# Patient Record
Sex: FEMALE | Race: BLACK OR AFRICAN AMERICAN | Employment: OTHER
[De-identification: names, ages, dates, MRNs, and addresses within clinical notes are randomized per-mention and may not be internally consistent; named-entity substitution may affect disease eponyms.]

---

## 2017-01-13 ENCOUNTER — SURGERY (OUTPATIENT)
Age: 52
End: 2017-01-13

## 2017-01-13 ENCOUNTER — ANESTHESIA (OUTPATIENT)
Dept: ENDOSCOPY | Facility: HOSPITAL | Age: 52
End: 2017-01-13
Payer: COMMERCIAL

## 2017-01-13 ENCOUNTER — ANESTHESIA EVENT (OUTPATIENT)
Dept: ENDOSCOPY | Facility: HOSPITAL | Age: 52
End: 2017-01-13
Payer: COMMERCIAL

## 2017-01-13 RX ORDER — LIDOCAINE HYDROCHLORIDE 10 MG/ML
INJECTION, SOLUTION EPIDURAL; INFILTRATION; INTRACAUDAL; PERINEURAL AS NEEDED
Status: DISCONTINUED | OUTPATIENT
Start: 2017-01-13 | End: 2017-01-13 | Stop reason: SURG

## 2017-01-13 RX ORDER — SODIUM CHLORIDE, SODIUM LACTATE, POTASSIUM CHLORIDE, CALCIUM CHLORIDE 600; 310; 30; 20 MG/100ML; MG/100ML; MG/100ML; MG/100ML
INJECTION, SOLUTION INTRAVENOUS CONTINUOUS PRN
Status: DISCONTINUED | OUTPATIENT
Start: 2017-01-13 | End: 2017-01-13 | Stop reason: SURG

## 2017-01-13 RX ADMIN — SODIUM CHLORIDE, SODIUM LACTATE, POTASSIUM CHLORIDE, CALCIUM CHLORIDE: 600; 310; 30; 20 INJECTION, SOLUTION INTRAVENOUS at 12:36:00

## 2017-01-13 RX ADMIN — LIDOCAINE HYDROCHLORIDE 50 MG: 10 INJECTION, SOLUTION EPIDURAL; INFILTRATION; INTRACAUDAL; PERINEURAL at 12:42:00

## 2017-01-13 RX ADMIN — SODIUM CHLORIDE, SODIUM LACTATE, POTASSIUM CHLORIDE, CALCIUM CHLORIDE: 600; 310; 30; 20 INJECTION, SOLUTION INTRAVENOUS at 13:08:00

## 2017-01-13 NOTE — ANESTHESIA PREPROCEDURE EVALUATION
Anesthesia PreOp Note    HPI:     Madi Last is a 49year old female who presents for preoperative consultation requested by: Alix Lehman MD    Date of Surgery: 1/13/2017    Procedure(s):  COLONOSCOPY  Indication: COLON SCRENNIG    Histo the lungs 4 (four) times daily as needed. Disp: 1 each Rfl: 2 Taking   albuterol sulfate (2.5 MG/3ML) 0.083% Inhalation Nebu Soln Take 3 mL (2.5 mg total) by nebulization every 6 (six) hours as needed for Wheezing.  Disp: 1 Box Rfl: 0 Taking   Levonorgestre Anesthesia ROS/Med Hx and Physical Exam     Patient summary reviewed and Nursing notes reviewed    Airway   Mallampati: I  TM distance: >3 FB  Neck ROM: full  Dental - normal exam     Pulmonary - negative ROS and normal exam   (+) asthma, sleep apnea,   Ca

## 2017-01-13 NOTE — ANESTHESIA POSTPROCEDURE EVALUATION
Patient: Niyah Major    Procedure Summary     Date Anesthesia Start Anesthesia Stop Room / Location    01/13/17 1239  [facility] ENDOSCOPY 01 / [facility] ENDOSCOPY       Procedure Diagnosis Surgeon Responsible Provider    COLONOSCOPY (N/A ) Special screenin

## 2017-01-16 ENCOUNTER — TELEPHONE (OUTPATIENT)
Dept: GASTROENTEROLOGY | Facility: CLINIC | Age: 52
End: 2017-01-16

## 2017-01-16 NOTE — TELEPHONE ENCOUNTER
----- Message from Hans Haile MD sent at 1/15/2017  2:37 PM CST -----  RN to place on the colon call back for 5 years and mail letter to the pt. Thanks.

## 2017-04-08 ENCOUNTER — OFFICE VISIT (OUTPATIENT)
Dept: FAMILY MEDICINE CLINIC | Facility: CLINIC | Age: 52
End: 2017-04-08

## 2017-04-08 VITALS
HEART RATE: 62 BPM | SYSTOLIC BLOOD PRESSURE: 157 MMHG | HEIGHT: 65 IN | TEMPERATURE: 98 F | BODY MASS INDEX: 48.82 KG/M2 | DIASTOLIC BLOOD PRESSURE: 85 MMHG | WEIGHT: 293 LBS | RESPIRATION RATE: 17 BRPM

## 2017-04-08 DIAGNOSIS — I10 ESSENTIAL HYPERTENSION: Primary | ICD-10-CM

## 2017-04-08 PROCEDURE — 99214 OFFICE O/P EST MOD 30 MIN: CPT | Performed by: FAMILY MEDICINE

## 2017-04-08 PROCEDURE — 99212 OFFICE O/P EST SF 10 MIN: CPT | Performed by: FAMILY MEDICINE

## 2017-04-08 NOTE — PATIENT INSTRUCTIONS
Monitor blood pressures and record at home. Limit salt intake. Consider starting antihypertensive. Recommend weight loss via daily exercising and consistent healthy dietary changes.

## 2017-04-22 NOTE — PROGRESS NOTES
HPI:    Patient ID: Mabel Prakash is a 49year old female. Hypertension  This is a chronic problem. The current episode started more than 1 year ago. The problem is unchanged. Condition status: variable measres.  Associated symptoms include per normal.   Left Ear: Tympanic membrane and ear canal normal.   Nose: Mucosal edema present. Thickened soft palate hanging low and obstructive. Cardiovascular: Normal rate and regular rhythm. Exam reveals no gallop. Edema present. Carotid bruit not pr

## 2017-05-13 ENCOUNTER — OFFICE VISIT (OUTPATIENT)
Dept: OPHTHALMOLOGY | Facility: CLINIC | Age: 52
End: 2017-05-13

## 2017-05-13 DIAGNOSIS — H52.203 MYOPIA WITH ASTIGMATISM, BILATERAL: ICD-10-CM

## 2017-05-13 DIAGNOSIS — Z96.1 PSEUDOPHAKIA OF BOTH EYES: ICD-10-CM

## 2017-05-13 DIAGNOSIS — H26.493: Primary | ICD-10-CM

## 2017-05-13 DIAGNOSIS — H52.13 MYOPIA WITH ASTIGMATISM, BILATERAL: ICD-10-CM

## 2017-05-13 PROBLEM — H26.499 AFTER-CATARACT WITH VISION OBSCURED: Status: ACTIVE | Noted: 2017-05-13

## 2017-05-13 PROBLEM — H52.10 MYOPIA WITH ASTIGMATISM: Status: ACTIVE | Noted: 2017-05-13

## 2017-05-13 PROBLEM — H52.209 MYOPIA WITH ASTIGMATISM: Status: ACTIVE | Noted: 2017-05-13

## 2017-05-13 PROCEDURE — 92015 DETERMINE REFRACTIVE STATE: CPT | Performed by: OPHTHALMOLOGY

## 2017-05-13 PROCEDURE — 99243 OFF/OP CNSLTJ NEW/EST LOW 30: CPT | Performed by: OPHTHALMOLOGY

## 2017-05-13 PROCEDURE — 99212 OFFICE O/P EST SF 10 MIN: CPT | Performed by: OPHTHALMOLOGY

## 2017-05-13 NOTE — PROGRESS NOTES
Dunia Proctor is a 49year old female. HPI:     HPI     Consult    Additional comments: Referral from Dr. Silvana Quan           Comments   Pt complains of blurred vision with her glasses at distance and would like an updated Rx.         Last edite for: Constitutional, Gastrointestinal, Neurological, Skin, Genitourinary, Musculoskeletal, HENT, Endocrine, Cardiovascular, Respiratory, Psychiatric, Allergic/Imm, Heme/Lymph    Last edited by Meredith Pro O.T. on 5/13/2017  7:34 AM. (History) YAG laser done to both eyes in the near future. Discussed risks, benefits, treatments and alternatives. Information given and reviewed with patient. Pseudophakia of both eyes  Same as above.            Myopia with astigmatism  Hold of on getting new

## 2017-05-13 NOTE — PATIENT INSTRUCTIONS
After-cataract with vision obscured  Discussed posterior capsule opacity with patient. Could have YAG laser done to both eyes in the near future. Discussed risks, benefits, treatments and alternatives. Information given and reviewed with patient.

## 2017-05-13 NOTE — ASSESSMENT & PLAN NOTE
Discussed posterior capsule opacity with patient. Could have YAG laser done to both eyes in the near future. Discussed risks, benefits, treatments and alternatives. Information given and reviewed with patient.

## 2017-05-17 ENCOUNTER — OFFICE VISIT (OUTPATIENT)
Dept: OPHTHALMOLOGY | Facility: CLINIC | Age: 52
End: 2017-05-17

## 2017-05-17 DIAGNOSIS — H26.491 AFTER-CATARACT WITH VISION OBSCURED, RIGHT: Primary | ICD-10-CM

## 2017-05-17 PROCEDURE — 66821 AFTER CATARACT LASER SURGERY: CPT | Performed by: OPHTHALMOLOGY

## 2017-05-17 NOTE — PATIENT INSTRUCTIONS
After-cataract with vision obscured  Right YAG Laser Capsulotomy was done in the office today by Dr. Peace Brown with no complications. Pt will return on May 31 for YAG laser of t he left eye.

## 2017-05-17 NOTE — ASSESSMENT & PLAN NOTE
Right YAG Laser Capsulotomy was done in the office today by Dr. Hieu Mauricio with no complications. Pt will return on May 31 for YAG laser of t he left eye.

## 2017-05-17 NOTE — PROGRESS NOTES
Robbie Gan is a 47year old female. HPI:     HPI     Here for a YAG laser capsulotomy of the right eye. Pt complains of blurred vision OU and is set up for left YAG on 5/31/17.         Last edited by Jenny Urbina O.T. on 5/17/2017  6:4 Visual Acuity (Snellen - Linear)      Right Left   Dist cc 20/50 20/30   Dist ph cc 20/40        Correction:  Glasses      Pupils      Pupils   Right PERRL   Left PERRL         Dilation     Right eye:  1.0% Mydriacyl and 2.5% Teo Synephrine @ 6:44 AM    X

## 2017-05-31 ENCOUNTER — TELEPHONE (OUTPATIENT)
Dept: OPHTHALMOLOGY | Facility: CLINIC | Age: 52
End: 2017-05-31

## 2017-05-31 ENCOUNTER — OFFICE VISIT (OUTPATIENT)
Dept: OPHTHALMOLOGY | Facility: CLINIC | Age: 52
End: 2017-05-31

## 2017-05-31 DIAGNOSIS — H26.8 OTHER CATARACT: Primary | ICD-10-CM

## 2017-05-31 DIAGNOSIS — H26.492 AFTER-CATARACT WITH VISION OBSCURED, LEFT: Primary | ICD-10-CM

## 2017-05-31 PROCEDURE — 66821 AFTER CATARACT LASER SURGERY: CPT | Performed by: OPHTHALMOLOGY

## 2017-05-31 NOTE — PATIENT INSTRUCTIONS
After-cataract with vision obscured  LEFT YAG Laser Capsulotomy was done in the office today by Dr. Fernando Garcia with no complications. Pt will return 2-4 weeks PO dilate BOTH eye.

## 2017-05-31 NOTE — ASSESSMENT & PLAN NOTE
LEFT YAG Laser Capsulotomy was done in the office today by Dr. Mehran Rios with no complications. Pt will return 2-4 weeks PO dilate BOTH eye.

## 2017-05-31 NOTE — PROGRESS NOTES
Mahesh Negrno is a 47year old female. HPI:     HPI     Here for a YAG laser capsulotomy of the left eye. Pt complains of blurred vision OS.         Last edited by Rajesh Olivas O.T. on 5/31/2017  7:02 AM.     Patient History:  Past [facility] Base Eye Exam     Visual Acuity (Snellen - Linear)      Right Left   Dist cc 20/30 20/30       Correction:  Glasses      Pupils      Pupils   Right PERRL   Left PERRL         Dilation     Left eye:  1.0% Mydriacyl and 2.5% Teo Synephrine @ 7:07 AM

## 2017-06-10 ENCOUNTER — OFFICE VISIT (OUTPATIENT)
Dept: OPHTHALMOLOGY | Facility: CLINIC | Age: 52
End: 2017-06-10

## 2017-06-10 DIAGNOSIS — Z98.890 POST-OPERATIVE STATE: Primary | ICD-10-CM

## 2017-06-10 PROCEDURE — 99024 POSTOP FOLLOW-UP VISIT: CPT | Performed by: OPHTHALMOLOGY

## 2017-06-10 PROCEDURE — 99212 OFFICE O/P EST SF 10 MIN: CPT | Performed by: OPHTHALMOLOGY

## 2017-06-10 NOTE — PROGRESS NOTES
Jamal Broussard is a 47year old female. HPI:     HPI     Patient is here for a post yag dilate, OU. Patient states that her vision has been improved since having her laser surgeries.        Last edited by Walter Colunga O.T. on 6/10/2017  9:5 Allergies    ROS:       PHYSICAL EXAM:     Base Eye Exam     Visual Acuity (Snellen - Linear)      Right Left   Dist cc 20/30 -2 20/30   Dist ph cc 20/25 NI   Near cc 20/30 20/25       Correction:  Glasses      Tonometry (Applanation, 10:09 AM)      Right instructions:  Return in about 1 year (around 6/10/2018) for Complete eye exam.    6/10/2017  Scribed by: Triston Dyson MD

## 2017-06-10 NOTE — PATIENT INSTRUCTIONS
Post-operative state  Patient is doing very well after bilateral YAG lasers. New glasses today; update as needed. Discussed that new prescription is only a slight change.

## 2017-06-10 NOTE — ASSESSMENT & PLAN NOTE
Patient is doing very well after bilateral YAG lasers. New glasses today; update as needed. Discussed that new prescription is only a slight change.

## 2017-07-24 ENCOUNTER — OFFICE VISIT (OUTPATIENT)
Dept: OPTOMETRY | Facility: CLINIC | Age: 52
End: 2017-07-24

## 2017-07-24 DIAGNOSIS — H52.213 IRREGULAR ASTIGMATISM OF BOTH EYES: Primary | ICD-10-CM

## 2017-07-24 PROCEDURE — 99024 POSTOP FOLLOW-UP VISIT: CPT | Performed by: OPTOMETRIST

## 2017-07-24 NOTE — PROGRESS NOTES
Neema Castellon is a 47year old female. HPI:     HPI     Patient is in for a contact lens check. Patient had Debi phelan May 2017 and wants to make sure her RX did not change. She just saw PRADEEP 6-2017  and had a refraction done.      Last edite Levonorgestrel (MIRENA IU) by Intrauterine route.  Disp:  Rfl:        Allergies:  No Known Allergies    ROS:     ROS     Negative for: Constitutional, Gastrointestinal, Neurological, Skin, Genitourinary, Musculoskeletal, HENT, Endocrine, Cardiovascular, E Astigmatism, irregular  New glasses and contact lens RX given today. No orders of the defined types were placed in this encounter.       Meds This Visit:    No prescriptions requested or ordered in this encounter     Follow up instructions:  No Fol

## 2017-07-24 NOTE — PROGRESS NOTES
Madi Last is a 47year old female. HPI:     HPI     Patient is in for a contact lens check. Patient had Meredith phelan May 2017 and wants to make sure her RX did not change. She just saw PRADEEP 6-2017  and had a refraction done.      Last edite Levonorgestrel (MIRENA IU) by Intrauterine route.  Disp:  Rfl:        Allergies:  No Known Allergies    ROS:     ROS     Negative for: Constitutional, Gastrointestinal, Neurological, Skin, Genitourinary, Musculoskeletal, HENT, Endocrine, Cardiovascular, E 7/25/2018                 ASSESSMENT/PLAN:     Diagnoses and Plan:     Astigmatism, irregular  New glasses and contact lens RX given today. No orders of the defined types were placed in this encounter.       Meds This Visit:    No prescriptions request

## 2017-09-22 ENCOUNTER — OFFICE VISIT (OUTPATIENT)
Dept: FAMILY MEDICINE CLINIC | Facility: CLINIC | Age: 52
End: 2017-09-22

## 2017-09-22 VITALS
HEIGHT: 65 IN | DIASTOLIC BLOOD PRESSURE: 92 MMHG | BODY MASS INDEX: 48.82 KG/M2 | HEART RATE: 70 BPM | TEMPERATURE: 98 F | WEIGHT: 293 LBS | SYSTOLIC BLOOD PRESSURE: 152 MMHG

## 2017-09-22 DIAGNOSIS — IMO0001 CLASS 3 OBESITY WITHOUT SERIOUS COMORBIDITY WITH BODY MASS INDEX (BMI) OF 60.0 TO 69.9 IN ADULT, UNSPECIFIED OBESITY TYPE: ICD-10-CM

## 2017-09-22 DIAGNOSIS — Z23 FLU VACCINE NEED: ICD-10-CM

## 2017-09-22 DIAGNOSIS — I10 ESSENTIAL HYPERTENSION: Primary | ICD-10-CM

## 2017-09-22 PROCEDURE — 90471 IMMUNIZATION ADMIN: CPT | Performed by: FAMILY MEDICINE

## 2017-09-22 PROCEDURE — 99214 OFFICE O/P EST MOD 30 MIN: CPT | Performed by: FAMILY MEDICINE

## 2017-09-22 PROCEDURE — 99212 OFFICE O/P EST SF 10 MIN: CPT | Performed by: FAMILY MEDICINE

## 2017-09-22 PROCEDURE — 90686 IIV4 VACC NO PRSV 0.5 ML IM: CPT | Performed by: FAMILY MEDICINE

## 2017-09-22 NOTE — PROGRESS NOTES
HPI:    Patient ID: Mabel Prakash is a 47year old female. Also needs flu vaccine. Blood Pressure   This is a recurrent problem. The current episode started more than 1 year ago. The problem has been unchanged.  The symptoms are aggravated Essential hypertension  By definition patient is hypertensive. Recommend weight loss via daily exercising and consistent healthy dietary changes. Monitor blood pressures and record at home. Limit salt intake.     2. Class 3 obesity without serious comorbid

## 2017-09-22 NOTE — PATIENT INSTRUCTIONS
Monitor blood pressures and record at home. Limit salt intake. Recommend weight loss via daily exercising and consistent healthy dietary changes. Encouraged physical fitness and daily physical activity daily (PLAY).   Will start Losartan/HCTZ 50/12.5mg or

## 2017-11-13 ENCOUNTER — TELEPHONE (OUTPATIENT)
Dept: OPHTHALMOLOGY | Facility: CLINIC | Age: 52
End: 2017-11-13

## 2017-11-13 ENCOUNTER — TELEPHONE (OUTPATIENT)
Dept: FAMILY MEDICINE CLINIC | Facility: CLINIC | Age: 52
End: 2017-11-13

## 2017-11-13 DIAGNOSIS — Z12.31 ENCOUNTER FOR SCREENING MAMMOGRAM FOR BREAST CANCER: Primary | ICD-10-CM

## 2017-11-13 NOTE — TELEPHONE ENCOUNTER
Since pt has had cataract Sx and YAGs OU. No procedures can be done to correct her astigmatism. I asked PPS and he explained it that only glasses or contacts will help correct her vision as best to 20/20 as was can.

## 2017-11-13 NOTE — TELEPHONE ENCOUNTER
Pt called stating pt had cataract surgery last year , procedure in may 2017. Pt has question, could pt get astigmatism correction and laser surgery to get 20/20 vision.   Call

## 2017-11-26 ENCOUNTER — HOSPITAL ENCOUNTER (OUTPATIENT)
Dept: MAMMOGRAPHY | Facility: HOSPITAL | Age: 52
Discharge: HOME | End: 2017-11-26
Attending: FAMILY MEDICINE
Payer: COMMERCIAL

## 2017-11-26 DIAGNOSIS — Z12.31 ENCOUNTER FOR SCREENING MAMMOGRAM FOR BREAST CANCER: ICD-10-CM

## 2017-11-26 PROCEDURE — 77063 BREAST TOMOSYNTHESIS BI: CPT | Performed by: FAMILY MEDICINE

## 2017-11-26 PROCEDURE — 77067 SCR MAMMO BI INCL CAD: CPT | Performed by: FAMILY MEDICINE

## 2017-12-09 ENCOUNTER — OFFICE VISIT (OUTPATIENT)
Dept: FAMILY MEDICINE CLINIC | Facility: CLINIC | Age: 52
End: 2017-12-09

## 2017-12-09 VITALS
RESPIRATION RATE: 17 BRPM | DIASTOLIC BLOOD PRESSURE: 92 MMHG | HEIGHT: 65 IN | BODY MASS INDEX: 48.82 KG/M2 | WEIGHT: 293 LBS | TEMPERATURE: 99 F | SYSTOLIC BLOOD PRESSURE: 150 MMHG | HEART RATE: 60 BPM

## 2017-12-09 DIAGNOSIS — E66.01 MORBID OBESITY WITH BMI OF 60.0-69.9, ADULT (HCC): ICD-10-CM

## 2017-12-09 DIAGNOSIS — I10 ESSENTIAL HYPERTENSION: Primary | ICD-10-CM

## 2017-12-09 PROCEDURE — 99212 OFFICE O/P EST SF 10 MIN: CPT | Performed by: FAMILY MEDICINE

## 2017-12-09 PROCEDURE — 99214 OFFICE O/P EST MOD 30 MIN: CPT | Performed by: FAMILY MEDICINE

## 2017-12-09 RX ORDER — LOSARTAN POTASSIUM AND HYDROCHLOROTHIAZIDE 12.5; 5 MG/1; MG/1
1 TABLET ORAL DAILY
Qty: 90 TABLET | Refills: 3 | Status: SHIPPED | OUTPATIENT
Start: 2017-12-09 | End: 2018-06-09

## 2017-12-09 NOTE — PROGRESS NOTES
HPI:    Patient ID: Monroe De La Cruz is a 47year old female. Hypertension   This is a chronic problem. The current episode started more than 1 month ago. The problem is unchanged. Condition status: Not to goal by criteria.  Pertinent negatives in Constitutional: She is oriented to person, place, and time and obese. HENT:   Right Ear: Tympanic membrane and ear canal normal.   Left Ear: Tympanic membrane and ear canal normal.   Nose: Mucosal edema present.    Mouth/Throat: Oropharynx is clear and

## 2017-12-09 NOTE — PATIENT INSTRUCTIONS
Monitor blood pressures and record at home. Limit salt intake. Recommend weight loss via daily exercising and consistent healthy dietary changes. Comply with medications. Medication reviewed and renewed where needed and appropriate.   Initiate Losartan/H

## 2018-01-20 ENCOUNTER — OFFICE VISIT (OUTPATIENT)
Dept: FAMILY MEDICINE CLINIC | Facility: CLINIC | Age: 53
End: 2018-01-20

## 2018-01-20 VITALS
DIASTOLIC BLOOD PRESSURE: 80 MMHG | SYSTOLIC BLOOD PRESSURE: 130 MMHG | WEIGHT: 293 LBS | HEART RATE: 67 BPM | BODY MASS INDEX: 48.82 KG/M2 | RESPIRATION RATE: 16 BRPM | HEIGHT: 65 IN | TEMPERATURE: 98 F

## 2018-01-20 DIAGNOSIS — J30.89 PERENNIAL ALLERGIC RHINITIS WITH SEASONAL VARIATION: ICD-10-CM

## 2018-01-20 DIAGNOSIS — L81.9 HYPERPIGMENTATION OF SKIN: ICD-10-CM

## 2018-01-20 DIAGNOSIS — J30.2 PERENNIAL ALLERGIC RHINITIS WITH SEASONAL VARIATION: ICD-10-CM

## 2018-01-20 DIAGNOSIS — I10 ESSENTIAL HYPERTENSION: Primary | ICD-10-CM

## 2018-01-20 PROCEDURE — 99214 OFFICE O/P EST MOD 30 MIN: CPT | Performed by: FAMILY MEDICINE

## 2018-01-20 PROCEDURE — 99212 OFFICE O/P EST SF 10 MIN: CPT | Performed by: FAMILY MEDICINE

## 2018-01-20 RX ORDER — LEVOCETIRIZINE DIHYDROCHLORIDE 5 MG/1
5 TABLET, FILM COATED ORAL EVERY EVENING
Qty: 30 TABLET | Refills: 1 | Status: SHIPPED | OUTPATIENT
Start: 2018-01-20 | End: 2019-10-03 | Stop reason: ALTCHOICE

## 2018-01-20 RX ORDER — HYDROQUINONE 40 MG/G
1 CREAM TOPICAL 2 TIMES DAILY
Qty: 30 G | Refills: 1 | Status: SHIPPED | OUTPATIENT
Start: 2018-01-20 | End: 2019-05-04 | Stop reason: ALTCHOICE

## 2018-01-20 NOTE — PATIENT INSTRUCTIONS
Comply with medications. Recommend weight loss via daily exercising and consistent healthy dietary changes. Monitor blood pressures and record at home. Limit salt intake. Xyzal prescribed. Hydroquinone prescribed.

## 2018-01-25 NOTE — PROGRESS NOTES
HPI:    Patient ID: Milena Borja is a 47year old female. Hypertension   This is a chronic problem. The current episode started more than 1 year ago. The problem has been waxing and waning since onset. The problem is controlled.  Pertinent neg Losartan Potassium-HCTZ 50-12.5 MG Oral Tab Take 1 tablet by mouth daily. Disp: 90 tablet Rfl: 3   Albuterol Sulfate 108 (90 BASE) MCG/ACT Inhalation Aerosol Powder, Breath Activated Inhale 2 puffs into the lungs 4 (four) times daily as needed.  Disp: 1 eac Signed Prescriptions Disp Refills    Hydroquinone 4 % External Cream 30 g 1      Sig: Apply 1 Application topically 2 (two) times daily.       Levocetirizine Dihydrochloride 5 MG Oral Tab 30 tablet 1      Sig: Take 1 tablet (5 mg total) by mouth every eveni

## 2018-03-19 ENCOUNTER — OFFICE VISIT (OUTPATIENT)
Dept: FAMILY MEDICINE CLINIC | Facility: CLINIC | Age: 53
End: 2018-03-19

## 2018-03-19 VITALS
RESPIRATION RATE: 17 BRPM | WEIGHT: 293 LBS | BODY MASS INDEX: 48.82 KG/M2 | HEIGHT: 65 IN | DIASTOLIC BLOOD PRESSURE: 80 MMHG | SYSTOLIC BLOOD PRESSURE: 140 MMHG

## 2018-03-19 DIAGNOSIS — E66.01 MORBID OBESITY WITH BMI OF 50.0-59.9, ADULT (HCC): ICD-10-CM

## 2018-03-19 DIAGNOSIS — I10 ESSENTIAL HYPERTENSION: Primary | ICD-10-CM

## 2018-03-19 PROCEDURE — 99214 OFFICE O/P EST MOD 30 MIN: CPT | Performed by: FAMILY MEDICINE

## 2018-03-19 PROCEDURE — 99212 OFFICE O/P EST SF 10 MIN: CPT | Performed by: FAMILY MEDICINE

## 2018-03-19 RX ORDER — GLUCOSAMINE/D3/BOSWELLIA SERRA 1500MG-400
1 TABLET ORAL DAILY
COMMUNITY

## 2018-03-19 NOTE — PROGRESS NOTES
HPI:    Patient ID: Lynette Kenny is a 47year old female. Hypertension   This is a chronic problem. The current episode started more than 1 year ago. The problem is unchanged. The problem is controlled.  Pertinent negatives include no chest pa nebulization every 6 (six) hours as needed for Wheezing. Disp: 1 Box Rfl: 0   Levonorgestrel (MIRENA IU) by Intrauterine route.  Disp:  Rfl:      Allergies:No Known Allergies     03/19/18  1539   BP: 140/80   Resp: 17   Weight: (!) 360 lb (163.3 kg)   Jean-Paul

## 2018-03-19 NOTE — PATIENT INSTRUCTIONS
Monitor blood pressures and record at home. Limit salt intake. Medication reviewed and renewed where needed and appropriate. Comply with medications. Encouraged physical fitness and daily physical activity daily (PLAY).

## 2018-03-27 ENCOUNTER — TELEPHONE (OUTPATIENT)
Dept: FAMILY MEDICINE CLINIC | Facility: CLINIC | Age: 53
End: 2018-03-27

## 2018-03-27 DIAGNOSIS — H26.9 CATARACT, UNSPECIFIED CATARACT TYPE, UNSPECIFIED LATERALITY: ICD-10-CM

## 2018-03-27 DIAGNOSIS — Z09 FOLLOW UP: Primary | ICD-10-CM

## 2018-03-27 NOTE — TELEPHONE ENCOUNTER
Patient called requesting a referral to follow up with Dr Piyush Mccurdy; hx of cataract surgery.      Please sign off on referral request.     Thank you, Jesus

## 2018-06-09 ENCOUNTER — OFFICE VISIT (OUTPATIENT)
Dept: FAMILY MEDICINE CLINIC | Facility: CLINIC | Age: 53
End: 2018-06-09

## 2018-06-09 VITALS
RESPIRATION RATE: 17 BRPM | TEMPERATURE: 98 F | HEIGHT: 65 IN | SYSTOLIC BLOOD PRESSURE: 147 MMHG | WEIGHT: 293 LBS | HEART RATE: 59 BPM | DIASTOLIC BLOOD PRESSURE: 88 MMHG | BODY MASS INDEX: 48.82 KG/M2

## 2018-06-09 DIAGNOSIS — H26.9 CATARACT OF BOTH EYES, UNSPECIFIED CATARACT TYPE: ICD-10-CM

## 2018-06-09 DIAGNOSIS — I10 ESSENTIAL HYPERTENSION: Primary | ICD-10-CM

## 2018-06-09 DIAGNOSIS — E66.01 MORBID OBESITY WITH BMI OF 60.0-69.9, ADULT (HCC): ICD-10-CM

## 2018-06-09 PROCEDURE — 99212 OFFICE O/P EST SF 10 MIN: CPT | Performed by: FAMILY MEDICINE

## 2018-06-09 PROCEDURE — 99214 OFFICE O/P EST MOD 30 MIN: CPT | Performed by: FAMILY MEDICINE

## 2018-06-09 RX ORDER — LOSARTAN POTASSIUM AND HYDROCHLOROTHIAZIDE 12.5; 5 MG/1; MG/1
1 TABLET ORAL DAILY
Qty: 90 TABLET | Refills: 3 | Status: SHIPPED | OUTPATIENT
Start: 2018-06-09 | End: 2018-10-27

## 2018-06-09 NOTE — PROGRESS NOTES
HPI:    Patient ID: Alfonzo Annar is a 47year old female. 47year old AA female needs referral for follow up on cataract procedure. Hypertension   This is a chronic problem. The current episode started more than 1 year ago.  The problem i Body mass index is 61.24 kg/m². PHYSICAL EXAM:   Physical Exam    Constitutional: She is oriented to person, place, and time and obese. No distress.    HENT:   Right Ear: Tympanic membrane and ear canal normal.   Left Ear: Tympanic membrane and e

## 2018-06-09 NOTE — PATIENT INSTRUCTIONS
Monitor blood pressures and record at home. Limit salt intake. Medication reviewed and renewed where needed and appropriate. Comply with medications. Recommend weight loss via daily exercising and consistent healthy dietary changes.   Referral to ophthal

## 2018-06-13 ENCOUNTER — OFFICE VISIT (OUTPATIENT)
Dept: OPHTHALMOLOGY | Facility: CLINIC | Age: 53
End: 2018-06-13

## 2018-06-13 DIAGNOSIS — H52.13 MYOPIA OF BOTH EYES WITH ASTIGMATISM: ICD-10-CM

## 2018-06-13 DIAGNOSIS — Z96.1 PSEUDOPHAKIA OF BOTH EYES: ICD-10-CM

## 2018-06-13 DIAGNOSIS — H52.203 MYOPIA OF BOTH EYES WITH ASTIGMATISM: ICD-10-CM

## 2018-06-13 DIAGNOSIS — H40.003 GLAUCOMA SUSPECT OF BOTH EYES: Primary | ICD-10-CM

## 2018-06-13 PROCEDURE — 92250 FUNDUS PHOTOGRAPHY W/I&R: CPT | Performed by: OPHTHALMOLOGY

## 2018-06-13 PROCEDURE — 92015 DETERMINE REFRACTIVE STATE: CPT | Performed by: OPHTHALMOLOGY

## 2018-06-13 PROCEDURE — 99243 OFF/OP CNSLTJ NEW/EST LOW 30: CPT | Performed by: OPHTHALMOLOGY

## 2018-06-13 PROCEDURE — 99212 OFFICE O/P EST SF 10 MIN: CPT | Performed by: OPHTHALMOLOGY

## 2018-06-13 NOTE — PATIENT INSTRUCTIONS
Myopia with astigmatism  New glasses Rx given today; patient has high astigmatism. Glasses are similar to what Dr. Zulema Cornelius gave her last visit.  Explained to patient that she may want to try a RGP Contact Lens or LASIK due to high amount of astigmatism in bot

## 2018-06-13 NOTE — ASSESSMENT & PLAN NOTE
New glasses Rx given today; patient has high astigmatism. Glasses are similar to what Dr. Charisse Echavarria gave her last visit. Explained to patient that she may want to try a RGP Contact Lens or LASIK due to high amount of astigmatism in both eyes.  Information given

## 2018-06-13 NOTE — PROGRESS NOTES
Lynette Kenny is a 47year old female. HPI:     HPI     Consult    Additional comments: Per Dr. Nicholas Sifuentes            Comments   Pt complains of blurred vision OU at distance with her glasses on and would like an updated glasses Rx.  Pt states th mouth. Disp:  Rfl:    Garlic 8543 MG Oral Cap Take by mouth. Disp:  Rfl:    Hydroquinone 4 % External Cream Apply 1 Application topically 2 (two) times daily.  Disp: 30 g Rfl: 1   Levocetirizine Dihydrochloride 5 MG Oral Tab Take 1 tablet (5 mg total) by mo Temporal crescent    C/D Ratio 0.25 0.5    Macula Normal Normal    Vessels Normal Normal    Periphery Normal Normal            Refraction     Wearing Rx       Sphere Cylinder Axis Add    Right -2.50 +4.00 180 +2.50    Left -3.25 +4.25 010 +2.50    Type:  P

## 2018-06-29 ENCOUNTER — TELEPHONE (OUTPATIENT)
Dept: FAMILY MEDICINE CLINIC | Facility: CLINIC | Age: 53
End: 2018-06-29

## 2018-06-29 DIAGNOSIS — H52.209 ASTIGMATISM, UNSPECIFIED LATERALITY, UNSPECIFIED TYPE: Primary | ICD-10-CM

## 2018-06-29 NOTE — TELEPHONE ENCOUNTER
Received a call from patient requesting a referral to Dr Kylee Dumont @ [facility] for astigmatism in both eyes.  Patient has appt July 10, 2018 at 1pm.     Please sign off on referral request.     Thank you, Jesus

## 2018-06-30 ENCOUNTER — NURSE ONLY (OUTPATIENT)
Dept: OPHTHALMOLOGY | Facility: CLINIC | Age: 53
End: 2018-06-30

## 2018-06-30 DIAGNOSIS — H40.003 GLAUCOMA SUSPECT OF BOTH EYES: ICD-10-CM

## 2018-06-30 PROCEDURE — 76514 ECHO EXAM OF EYE THICKNESS: CPT | Performed by: OPHTHALMOLOGY

## 2018-06-30 PROCEDURE — 92083 EXTENDED VISUAL FIELD XM: CPT | Performed by: OPHTHALMOLOGY

## 2018-06-30 PROCEDURE — 92133 CPTRZD OPH DX IMG PST SGM ON: CPT | Performed by: OPHTHALMOLOGY

## 2018-06-30 NOTE — PROGRESS NOTES
Charles De Jesus is a 47year old female. HPI:     HPI     Patient is here for a glaucoma work up with HVF, OCT and Pachy, no M.D. Patient has follow up appointment with Dr. Paras Munoz on 7/10 and would like all notes faxed to [phone]. Hydroquinone 4 % External Cream Apply 1 Application topically 2 (two) times daily. Disp: 30 g Rfl: 1   Levocetirizine Dihydrochloride 5 MG Oral Tab Take 1 tablet (5 mg total) by mouth every evening.  Disp: 30 tablet Rfl: 1   Albuterol Sulfate 108 (90 BASE

## 2018-07-06 ENCOUNTER — TELEPHONE (OUTPATIENT)
Dept: OPHTHALMOLOGY | Facility: CLINIC | Age: 53
End: 2018-07-06

## 2018-07-06 NOTE — TELEPHONE ENCOUNTER
Pt called. Pt referred to Dr. Anna Arroyo. appt is scheduled 7/10. Pt received a call from dr Chiquita hernandez and they have not received any records from [facility]. Please send. Fax #  [phone].

## 2018-07-09 ENCOUNTER — OFFICE VISIT (OUTPATIENT)
Dept: OBGYN CLINIC | Facility: CLINIC | Age: 53
End: 2018-07-09

## 2018-07-09 VITALS
HEIGHT: 65.5 IN | WEIGHT: 293 LBS | HEART RATE: 61 BPM | SYSTOLIC BLOOD PRESSURE: 128 MMHG | DIASTOLIC BLOOD PRESSURE: 85 MMHG | BODY MASS INDEX: 48.23 KG/M2

## 2018-07-09 DIAGNOSIS — Z01.419 ENCOUNTER FOR GYNECOLOGICAL EXAMINATION WITHOUT ABNORMAL FINDING: Primary | ICD-10-CM

## 2018-07-09 DIAGNOSIS — Z12.31 VISIT FOR SCREENING MAMMOGRAM: ICD-10-CM

## 2018-07-09 PROCEDURE — 99396 PREV VISIT EST AGE 40-64: CPT | Performed by: OBSTETRICS & GYNECOLOGY

## 2018-07-09 NOTE — PROGRESS NOTES
Lynette Kenny is a 47year old female  No LMP recorded. Patient is not currently having periods (Reason: IUD - Intrauterine Device). who presents for Patient presents with:  Gyn Exam: annual   She has no complaints.   She has had no abnorma Drug use: No    Sexual activity: Yes    Partners: Male    Birth control/ protection: Mirena     Other Topics Concern    Caffeine Concern Yes    Comment: coffee 1 cup     Social History Narrative   None on file       FAMILY HISTORY:  Family History   Pro or discharge. Neurological:  denies headaches, extremity weakness or numbness. Psychiatric: denies depression or anxiety. Endocrine:   denies excessive thirst or urination. Heme/Lymph:  denies history of anemia, easy bruising or bleeding.       PHYSICA

## 2018-07-16 ENCOUNTER — TELEPHONE (OUTPATIENT)
Dept: OPHTHALMOLOGY | Facility: CLINIC | Age: 53
End: 2018-07-16

## 2018-07-16 ENCOUNTER — TELEPHONE (OUTPATIENT)
Dept: FAMILY MEDICINE CLINIC | Facility: CLINIC | Age: 53
End: 2018-07-16

## 2018-07-16 NOTE — TELEPHONE ENCOUNTER
Patient requesting records from [facility] sent to Dr. Destinee Escalante, fax: [phone]. This would be her first visit with this provider for a second opinion. He is a cornea specialist with [facility]. Patient would also like to see if office notes can also be mailed to home address. Please call back with confirmation once completed. Pt states that VM would be fine if she does not answer.

## 2018-07-16 NOTE — TELEPHONE ENCOUNTER
Patient requesting records from [facility] sent to Dr. Ander Noriega, fax: [phone]. Patient would also like to see if office notes can also be mailed to home address.

## 2018-07-17 NOTE — TELEPHONE ENCOUNTER
Last 2 progress notes faxed to requested #. Also sent a copy to patient's home. LM on patient's phone of this action.

## 2018-07-24 NOTE — TELEPHONE ENCOUNTER
Please call patient and advise her to contact our medical records department to send a copy of her records.

## 2018-09-18 ENCOUNTER — TELEPHONE (OUTPATIENT)
Dept: FAMILY MEDICINE CLINIC | Facility: CLINIC | Age: 53
End: 2018-09-18

## 2018-09-18 NOTE — TELEPHONE ENCOUNTER
Dr. Beatriz Arndt is out of network. Referral has been canceled. Patient was referred to Dr. Patrice Wilks at [facility].      Thank you,  [facility]

## 2018-10-02 ENCOUNTER — HOSPITAL ENCOUNTER (OUTPATIENT)
Age: 53
Discharge: HOME | End: 2018-10-02
Attending: EMERGENCY MEDICINE
Payer: COMMERCIAL

## 2018-10-02 ENCOUNTER — APPOINTMENT (OUTPATIENT)
Dept: GENERAL RADIOLOGY | Age: 53
End: 2018-10-02
Attending: EMERGENCY MEDICINE
Payer: COMMERCIAL

## 2018-10-02 VITALS
HEART RATE: 62 BPM | SYSTOLIC BLOOD PRESSURE: 122 MMHG | RESPIRATION RATE: 22 BRPM | OXYGEN SATURATION: 95 % | DIASTOLIC BLOOD PRESSURE: 65 MMHG | TEMPERATURE: 97 F

## 2018-10-02 DIAGNOSIS — M79.671 RIGHT FOOT PAIN: Primary | ICD-10-CM

## 2018-10-02 PROCEDURE — 99204 OFFICE O/P NEW MOD 45 MIN: CPT

## 2018-10-02 PROCEDURE — 73630 X-RAY EXAM OF FOOT: CPT | Performed by: EMERGENCY MEDICINE

## 2018-10-02 PROCEDURE — 99213 OFFICE O/P EST LOW 20 MIN: CPT

## 2018-10-02 RX ORDER — MELOXICAM 7.5 MG/1
7.5 TABLET ORAL DAILY
Qty: 10 TABLET | Refills: 0 | Status: SHIPPED | OUTPATIENT
Start: 2018-10-02 | End: 2019-05-04 | Stop reason: ALTCHOICE

## 2018-10-02 RX ORDER — MELOXICAM 7.5 MG/1
7.5 TABLET ORAL DAILY
Qty: 10 TABLET | Refills: 0 | Status: SHIPPED | OUTPATIENT
Start: 2018-10-02 | End: 2018-10-02

## 2018-10-02 NOTE — ED INITIAL ASSESSMENT (HPI)
Pt here with complaints of right foot pain, pt states she has been having pain for about 1 week and this morning pain became worse to top of foot and bottom middle of foot, pt states pain was worse with walking, pt denies any injury or falls

## 2018-10-03 NOTE — ED NOTES
Pt discharged home , prescription electronically sent to the pharmacy ,pt instructed to follow up with her primary md if symptoms do not improve

## 2018-10-03 NOTE — ED PROVIDER NOTES
Patient Seen in: [facility]    History   Patient presents with:   Foot Pain    Stated Complaint: RT FOOT SWEELING AND PAIN    HPI    16-year-old female patient presents complaining of pain and swelling to her right midfoo Never Used    Alcohol use: No    Drug use: No      Review of Systems    Positive for stated complaint: RT FOOT SWEELING AND PAIN  Other systems are as noted in HPI. Constitutional and vital signs reviewed.       All other systems reviewed and negative exce 1 tablet (7.5 mg total) by mouth daily. Take as needed for pain.   Qty: 10 tablet Refills: 0

## 2018-10-27 ENCOUNTER — OFFICE VISIT (OUTPATIENT)
Dept: FAMILY MEDICINE CLINIC | Facility: CLINIC | Age: 53
End: 2018-10-27

## 2018-10-27 VITALS
BODY MASS INDEX: 48.23 KG/M2 | DIASTOLIC BLOOD PRESSURE: 80 MMHG | HEIGHT: 65.5 IN | SYSTOLIC BLOOD PRESSURE: 140 MMHG | RESPIRATION RATE: 18 BRPM | WEIGHT: 293 LBS

## 2018-10-27 DIAGNOSIS — E66.01 MORBID OBESITY WITH BMI OF 50.0-59.9, ADULT (HCC): ICD-10-CM

## 2018-10-27 DIAGNOSIS — I10 ESSENTIAL HYPERTENSION: ICD-10-CM

## 2018-10-27 DIAGNOSIS — J30.9 ALLERGIC RHINITIS, UNSPECIFIED SEASONALITY, UNSPECIFIED TRIGGER: ICD-10-CM

## 2018-10-27 DIAGNOSIS — H18.603 KERATOCONUS OF BOTH EYES: Primary | ICD-10-CM

## 2018-10-27 PROCEDURE — 99212 OFFICE O/P EST SF 10 MIN: CPT | Performed by: FAMILY MEDICINE

## 2018-10-27 PROCEDURE — 99214 OFFICE O/P EST MOD 30 MIN: CPT | Performed by: FAMILY MEDICINE

## 2018-10-27 RX ORDER — LEVOCETIRIZINE DIHYDROCHLORIDE 5 MG/1
5 TABLET, FILM COATED ORAL EVERY EVENING
Qty: 30 TABLET | Refills: 0 | Status: SHIPPED | OUTPATIENT
Start: 2018-10-27 | End: 2019-05-04

## 2018-10-27 RX ORDER — LOSARTAN POTASSIUM AND HYDROCHLOROTHIAZIDE 12.5; 5 MG/1; MG/1
1 TABLET ORAL DAILY
Qty: 90 TABLET | Refills: 3 | Status: SHIPPED | OUTPATIENT
Start: 2018-10-27 | End: 2019-01-21

## 2018-10-27 NOTE — PATIENT INSTRUCTIONS
Medication reviewed and renewed where needed and appropriate. Comply with medications. Recommend weight loss via daily exercising and consistent healthy dietary changes. Monitor blood pressures and record at home. Limit salt intake.   Continue with ophth

## 2018-10-27 NOTE — PROGRESS NOTES
HPI:    Patient ID: Arabella Montez is a 47year old female. Here also on follow up diagnosis of keratoconus OU. Hypertension   This is a chronic problem. The current episode started more than 1 year ago. The problem is unchanged.  The prob Powder, Breath Activated Inhale 2 puffs into the lungs 4 (four) times daily as needed. Disp: 1 each Rfl: 2   albuterol sulfate (2.5 MG/3ML) 0.083% Inhalation Nebu Soln Take 3 mL (2.5 mg total) by nebulization every 6 (six) hours as needed for Wheezing.  Dis dietary changes. Monitor blood pressures and record at home. Limit salt intake. Continue with ophthalmology appointments (Keratoconus OU). Xyzal prescribed. Increase hydration.     Return in about 3 months (around 1/27/2019), or if symptoms worsen or kimmie

## 2018-11-08 ENCOUNTER — TELEPHONE (OUTPATIENT)
Dept: FAMILY MEDICINE CLINIC | Facility: CLINIC | Age: 53
End: 2018-11-08

## 2018-11-08 NOTE — TELEPHONE ENCOUNTER
Ernesto Granados [address] [place] [address] placed three retro referrals to Dr. Lynne Epperson who is out of network. Patient was approved to see Dr. Sweetie Butterfield at [facility], there is no clinical scanned in the chart confirming that she saw him. Are you aware if she saw him? Additionally, if you are willing to make this out of network referral please notate, yes and provide clinical reason for Dr. Jesica Saldaña to review.      Thank you,   Zach Milton

## 2018-11-09 NOTE — TELEPHONE ENCOUNTER
Hi again,     That's a different patient. It appears this patient has started treatment with Dr. Madai Castaneda as of October 25.

## 2018-11-09 NOTE — TELEPHONE ENCOUNTER
Chinedu Leblanc if this is the patient dictating her own path, then instruct her that this needs to be brought to or back to the network.

## 2018-11-09 NOTE — TELEPHONE ENCOUNTER
Yes, I still need to know if you want to refer her out of network or to Dr. Ina Almeida at [facility] as Dr. Dinesh Lockwood recommended in July of this year.

## 2018-12-02 ENCOUNTER — HOSPITAL ENCOUNTER (OUTPATIENT)
Dept: MAMMOGRAPHY | Facility: HOSPITAL | Age: 53
Discharge: HOME | End: 2018-12-02
Attending: OBSTETRICS & GYNECOLOGY
Payer: COMMERCIAL

## 2018-12-02 DIAGNOSIS — Z12.31 VISIT FOR SCREENING MAMMOGRAM: ICD-10-CM

## 2018-12-02 PROCEDURE — 77063 BREAST TOMOSYNTHESIS BI: CPT | Performed by: OBSTETRICS & GYNECOLOGY

## 2018-12-02 PROCEDURE — 77067 SCR MAMMO BI INCL CAD: CPT | Performed by: OBSTETRICS & GYNECOLOGY

## 2019-01-21 ENCOUNTER — OFFICE VISIT (OUTPATIENT)
Dept: FAMILY MEDICINE CLINIC | Facility: CLINIC | Age: 54
End: 2019-01-21

## 2019-01-21 VITALS
SYSTOLIC BLOOD PRESSURE: 148 MMHG | WEIGHT: 293 LBS | BODY MASS INDEX: 48.23 KG/M2 | RESPIRATION RATE: 18 BRPM | HEIGHT: 65.5 IN | DIASTOLIC BLOOD PRESSURE: 82 MMHG

## 2019-01-21 DIAGNOSIS — H18.603 KERATOCONUS OF BOTH EYES: Primary | ICD-10-CM

## 2019-01-21 DIAGNOSIS — Q82.8 ACCESSORY SKIN TAGS: ICD-10-CM

## 2019-01-21 DIAGNOSIS — I10 ESSENTIAL HYPERTENSION: ICD-10-CM

## 2019-01-21 PROCEDURE — 99214 OFFICE O/P EST MOD 30 MIN: CPT | Performed by: FAMILY MEDICINE

## 2019-01-21 PROCEDURE — 99212 OFFICE O/P EST SF 10 MIN: CPT | Performed by: FAMILY MEDICINE

## 2019-01-21 RX ORDER — LOSARTAN POTASSIUM AND HYDROCHLOROTHIAZIDE 12.5; 5 MG/1; MG/1
1 TABLET ORAL DAILY
Qty: 90 TABLET | Refills: 3 | Status: SHIPPED | OUTPATIENT
Start: 2019-01-21 | End: 2019-05-04

## 2019-01-21 RX ORDER — PROMETHAZINE HYDROCHLORIDE 25 MG/1
TABLET ORAL
Refills: 6 | COMMUNITY
Start: 2019-01-11 | End: 2019-08-03

## 2019-01-21 NOTE — PROGRESS NOTES
HPI:    Patient ID: Nikolay Nava is a 47year old female. Patient is a 52-year-old -American female here today to get her ophthalmology and optometry referrals updated so she can have her follow-up appointments.   Patient also has 3 ski Meloxicam (MOBIC) 7.5 MG Oral Tab Take 1 tablet (7.5 mg total) by mouth daily. Take as needed for pain. Disp: 10 tablet Rfl: 0   Hydroquinone 4 % External Cream Apply 1 Application topically 2 (two) times daily.  Disp: 30 g Rfl: 1   Albuterol Sulfate 108 (9 Skin tag removal x 3. Medication reviewed and renewed where needed and appropriate. Monitor blood pressures and record at home. Limit salt intake. Recommend weight loss via daily exercising and consistent healthy dietary changes.   Keep ophthalmology jesenia

## 2019-01-21 NOTE — PATIENT INSTRUCTIONS
Skin tag removal x 3. Medication reviewed and renewed where needed and appropriate. Monitor blood pressures and record at home. Limit salt intake. Recommend weight loss via daily exercising and consistent healthy dietary changes.   Keep ophthalmology jesenia

## 2019-03-13 ENCOUNTER — APPOINTMENT (OUTPATIENT)
Dept: GENERAL RADIOLOGY | Age: 54
End: 2019-03-13
Attending: EMERGENCY MEDICINE
Payer: COMMERCIAL

## 2019-03-13 ENCOUNTER — HOSPITAL ENCOUNTER (OUTPATIENT)
Age: 54
Discharge: HOME | End: 2019-03-13
Attending: EMERGENCY MEDICINE
Payer: COMMERCIAL

## 2019-03-13 VITALS
OXYGEN SATURATION: 100 % | RESPIRATION RATE: 18 BRPM | DIASTOLIC BLOOD PRESSURE: 94 MMHG | SYSTOLIC BLOOD PRESSURE: 133 MMHG | TEMPERATURE: 98 F | HEART RATE: 66 BPM

## 2019-03-13 DIAGNOSIS — J02.9 VIRAL PHARYNGITIS: Primary | ICD-10-CM

## 2019-03-13 LAB — S PYO AG THROAT QL: NEGATIVE

## 2019-03-13 PROCEDURE — 71046 X-RAY EXAM CHEST 2 VIEWS: CPT | Performed by: EMERGENCY MEDICINE

## 2019-03-13 PROCEDURE — 99214 OFFICE O/P EST MOD 30 MIN: CPT

## 2019-03-13 PROCEDURE — 87430 STREP A AG IA: CPT

## 2019-03-13 PROCEDURE — 99213 OFFICE O/P EST LOW 20 MIN: CPT

## 2019-03-13 RX ORDER — BENZONATATE 100 MG/1
100 CAPSULE ORAL 3 TIMES DAILY PRN
Qty: 21 CAPSULE | Refills: 0 | Status: SHIPPED | OUTPATIENT
Start: 2019-03-13 | End: 2019-05-04 | Stop reason: ALTCHOICE

## 2019-03-13 RX ORDER — AZITHROMYCIN 250 MG/1
TABLET, FILM COATED ORAL
Qty: 1 PACKAGE | Refills: 0 | Status: SHIPPED | OUTPATIENT
Start: 2019-03-13 | End: 2019-03-18

## 2019-03-13 RX ORDER — ALBUTEROL SULFATE 90 UG/1
2 AEROSOL, METERED RESPIRATORY (INHALATION) EVERY 6 HOURS PRN
Qty: 1 INHALER | Refills: 0 | Status: SHIPPED | OUTPATIENT
Start: 2019-03-13 | End: 2019-04-12

## 2019-03-13 NOTE — ED INITIAL ASSESSMENT (HPI)
Pt to IC with cough, sore throat and fever x 4 days. States she has episodes where she is short of breath. \"Feels feverish\" but did not measure her temperature. States she did receive flu shot this year. Occasional popping sensation in her ears.

## 2019-03-13 NOTE — ED PROVIDER NOTES
Patient Seen in: [address]    History     Stated Complaint: Cough, Fever, Chills, Sore Throat     HPI    This patient states she has been ill for the past 4 days.   She complains of sore throat cough body aches feeling f throat  Respiratory positive  cough  Cardiac no chest pain  GI no vomiting or diarrhea  Musculoskeletal no swelling  Neurologic denies dizziness  Skin no rash      All other systems reviewed and negative except as noted above.     Physical Exam     ED [facility] nodularity. No abnormal pleural thickening nor pneumothorax. Is BONES: No fracture or visible bony lesion. OTHER: Negative. CONCLUSION:  1. Findings consistent with asthma and bronchitis. No focal consolidation, effusion mass or nodularity.  2.

## 2019-05-04 ENCOUNTER — OFFICE VISIT (OUTPATIENT)
Dept: FAMILY MEDICINE CLINIC | Facility: CLINIC | Age: 54
End: 2019-05-04

## 2019-05-04 VITALS
RESPIRATION RATE: 18 BRPM | BODY MASS INDEX: 58 KG/M2 | WEIGHT: 293 LBS | TEMPERATURE: 98 F | SYSTOLIC BLOOD PRESSURE: 135 MMHG | DIASTOLIC BLOOD PRESSURE: 82 MMHG | HEART RATE: 64 BPM

## 2019-05-04 DIAGNOSIS — R09.81 NASAL SINUS CONGESTION: ICD-10-CM

## 2019-05-04 DIAGNOSIS — I10 ESSENTIAL HYPERTENSION: Primary | ICD-10-CM

## 2019-05-04 DIAGNOSIS — E66.01 MORBID OBESITY WITH BMI OF 50.0-59.9, ADULT (HCC): ICD-10-CM

## 2019-05-04 PROCEDURE — 99212 OFFICE O/P EST SF 10 MIN: CPT | Performed by: FAMILY MEDICINE

## 2019-05-04 PROCEDURE — 99214 OFFICE O/P EST MOD 30 MIN: CPT | Performed by: FAMILY MEDICINE

## 2019-05-04 RX ORDER — LOSARTAN POTASSIUM AND HYDROCHLOROTHIAZIDE 12.5; 5 MG/1; MG/1
1 TABLET ORAL DAILY
Qty: 90 TABLET | Refills: 3 | Status: SHIPPED | OUTPATIENT
Start: 2019-05-04 | End: 2019-08-03

## 2019-05-04 NOTE — PROGRESS NOTES
HPI:    Patient ID: Monroe De La Cruz is a 47year old female. Patient is a 52-year-old delightful -American female well-known to the clinic here for an assessment regarding the treatment for her hypertension.   Patient denies any symptoms s Cardiovascular: Normal rate and regular rhythm. Exam reveals no gallop. Murmur heard. Edema not present. Carotid bruit not present. Pulmonary/Chest: Effort normal and breath sounds normal. No respiratory distress.    Neurological: She is alert and o

## 2019-05-04 NOTE — PATIENT INSTRUCTIONS
Recommend weight loss via daily exercising and consistent healthy dietary changes. Monitor blood pressures and record at home. Limit salt intake. Comply with medications. Encouraged physical fitness and daily physical activity daily (PLAY).   Patient enc

## 2019-06-14 ENCOUNTER — HOSPITAL ENCOUNTER (OUTPATIENT)
Age: 54
Discharge: HOME | End: 2019-06-14
Attending: FAMILY MEDICINE
Payer: COMMERCIAL

## 2019-06-14 ENCOUNTER — APPOINTMENT (OUTPATIENT)
Dept: GENERAL RADIOLOGY | Age: 54
End: 2019-06-14
Attending: FAMILY MEDICINE
Payer: COMMERCIAL

## 2019-06-14 VITALS
OXYGEN SATURATION: 98 % | TEMPERATURE: 97 F | HEART RATE: 70 BPM | RESPIRATION RATE: 18 BRPM | DIASTOLIC BLOOD PRESSURE: 70 MMHG | SYSTOLIC BLOOD PRESSURE: 125 MMHG

## 2019-06-14 DIAGNOSIS — M79.672 LEFT FOOT PAIN: Primary | ICD-10-CM

## 2019-06-14 DIAGNOSIS — J06.9 UPPER RESPIRATORY TRACT INFECTION, UNSPECIFIED TYPE: ICD-10-CM

## 2019-06-14 PROCEDURE — 99213 OFFICE O/P EST LOW 20 MIN: CPT

## 2019-06-14 PROCEDURE — 73630 X-RAY EXAM OF FOOT: CPT | Performed by: FAMILY MEDICINE

## 2019-06-14 PROCEDURE — 99214 OFFICE O/P EST MOD 30 MIN: CPT

## 2019-06-14 RX ORDER — PROMETHAZINE HYDROCHLORIDE AND CODEINE PHOSPHATE 6.25; 1 MG/5ML; MG/5ML
5 SYRUP ORAL EVERY 6 HOURS PRN
Qty: 120 ML | Refills: 0 | Status: SHIPPED | OUTPATIENT
Start: 2019-06-14 | End: 2019-07-14

## 2019-06-14 NOTE — ED PROVIDER NOTES
Patient Seen in: [address] [facility] [address]    History   Patient presents with:  Bite Sting,Insect (integumentary)  Cough/URI    Stated Complaint: coughing, congestion, ears poppig, Left foot  insect bite     HPI    50-year-old female p Lata Johnson MD at [address] [place] [address] ENDOSCOPY   • D & C     • D & C  2013   • YAG CAPSULOTOMY - OD - RIGHT EYE Right 5/17/17    [facility]   • YAG CAPSULOTOMY - OS - LEFT EYE Left 5/31/17    [facility]       Family history reviewed and is not pertinent to presenting problem.     Social foot: There is normal range of motion and no deformity. Feet:   Left Foot:   Skin Integrity: Positive for callus. Negative for ulcer, blister, skin breakdown, erythema, warmth or dry skin.    Neurological: She is alert and oriented to person, place, and t

## 2019-06-14 NOTE — ED INITIAL ASSESSMENT (HPI)
Pt states having an insect bit on side of left foot, pt presents with bump. Pt states having a cold last week, she went to [facility] was given a z-pack for an URI. Pt states still having sinus congestion, with a cough that keeping her up at night.  Pt states havi

## 2019-07-08 ENCOUNTER — HOSPITAL ENCOUNTER (EMERGENCY)
Facility: HOSPITAL | Age: 54
Discharge: HOME | End: 2019-07-08
Attending: EMERGENCY MEDICINE
Payer: COMMERCIAL

## 2019-07-08 ENCOUNTER — APPOINTMENT (OUTPATIENT)
Dept: CT IMAGING | Facility: HOSPITAL | Age: 54
End: 2019-07-08
Attending: EMERGENCY MEDICINE
Payer: COMMERCIAL

## 2019-07-08 VITALS
OXYGEN SATURATION: 99 % | BODY MASS INDEX: 57 KG/M2 | HEART RATE: 58 BPM | WEIGHT: 293 LBS | RESPIRATION RATE: 16 BRPM | TEMPERATURE: 99 F | SYSTOLIC BLOOD PRESSURE: 118 MMHG | DIASTOLIC BLOOD PRESSURE: 56 MMHG

## 2019-07-08 DIAGNOSIS — R10.32 ABDOMINAL PAIN, LEFT LOWER QUADRANT: Primary | ICD-10-CM

## 2019-07-08 LAB
ANION GAP SERPL CALC-SCNC: 7 MMOL/L (ref 0–18)
B-HCG UR QL: NEGATIVE
BASOPHILS # BLD AUTO: 0.08 X10(3) UL (ref 0–0.2)
BASOPHILS NFR BLD AUTO: 0.7 %
BILIRUB UR QL: NEGATIVE
BUN BLD-MCNC: 22 MG/DL (ref 7–18)
BUN/CREAT SERPL: 25.6 (ref 10–20)
CALCIUM BLD-MCNC: 8.1 MG/DL (ref 8.5–10.1)
CHLORIDE SERPL-SCNC: 108 MMOL/L (ref 98–112)
CLARITY UR: CLEAR
CO2 SERPL-SCNC: 27 MMOL/L (ref 21–32)
COLOR UR: YELLOW
CREAT BLD-MCNC: 0.86 MG/DL (ref 0.55–1.02)
DEPRECATED RDW RBC AUTO: 47.1 FL (ref 35.1–46.3)
EOSINOPHIL # BLD AUTO: 0.23 X10(3) UL (ref 0–0.7)
EOSINOPHIL NFR BLD AUTO: 1.9 %
ERYTHROCYTE [DISTWIDTH] IN BLOOD BY AUTOMATED COUNT: 14.7 % (ref 11–15)
GLUCOSE BLD-MCNC: 108 MG/DL (ref 70–99)
GLUCOSE UR-MCNC: NEGATIVE MG/DL
HCT VFR BLD AUTO: 37.3 % (ref 35–48)
HGB BLD-MCNC: 12.3 G/DL (ref 12–16)
HGB UR QL STRIP.AUTO: NEGATIVE
IMM GRANULOCYTES # BLD AUTO: 0.03 X10(3) UL (ref 0–1)
IMM GRANULOCYTES NFR BLD: 0.3 %
KETONES UR-MCNC: NEGATIVE MG/DL
LEUKOCYTE ESTERASE UR QL STRIP.AUTO: NEGATIVE
LYMPHOCYTES # BLD AUTO: 3.11 X10(3) UL (ref 1–4)
LYMPHOCYTES NFR BLD AUTO: 26.3 %
MCH RBC QN AUTO: 28.8 PG (ref 26–34)
MCHC RBC AUTO-ENTMCNC: 33 G/DL (ref 31–37)
MCV RBC AUTO: 87.4 FL (ref 80–100)
MONOCYTES # BLD AUTO: 0.97 X10(3) UL (ref 0.1–1)
MONOCYTES NFR BLD AUTO: 8.2 %
NEUTROPHILS # BLD AUTO: 7.42 X10 (3) UL (ref 1.5–7.7)
NEUTROPHILS # BLD AUTO: 7.42 X10(3) UL (ref 1.5–7.7)
NEUTROPHILS NFR BLD AUTO: 62.6 %
NITRITE UR QL STRIP.AUTO: NEGATIVE
OSMOLALITY SERPL CALC.SUM OF ELEC: 298 MOSM/KG (ref 275–295)
PH UR: 7 [PH] (ref 5–8)
PLATELET # BLD AUTO: 245 10(3)UL (ref 150–450)
POTASSIUM SERPL-SCNC: 3.7 MMOL/L (ref 3.5–5.1)
PROT UR-MCNC: NEGATIVE MG/DL
RBC # BLD AUTO: 4.27 X10(6)UL (ref 3.8–5.3)
SODIUM SERPL-SCNC: 142 MMOL/L (ref 136–145)
SP GR UR STRIP: 1.01 (ref 1–1.03)
UROBILINOGEN UR STRIP-ACNC: <2
VIT C UR-MCNC: NEGATIVE MG/DL
WBC # BLD AUTO: 11.8 X10(3) UL (ref 4–11)

## 2019-07-08 PROCEDURE — 99284 EMERGENCY DEPT VISIT MOD MDM: CPT

## 2019-07-08 PROCEDURE — 74177 CT ABD & PELVIS W/CONTRAST: CPT | Performed by: EMERGENCY MEDICINE

## 2019-07-08 PROCEDURE — 80048 BASIC METABOLIC PNL TOTAL CA: CPT | Performed by: EMERGENCY MEDICINE

## 2019-07-08 PROCEDURE — 85025 COMPLETE CBC W/AUTO DIFF WBC: CPT | Performed by: EMERGENCY MEDICINE

## 2019-07-08 PROCEDURE — 96374 THER/PROPH/DIAG INJ IV PUSH: CPT

## 2019-07-08 PROCEDURE — 96361 HYDRATE IV INFUSION ADD-ON: CPT

## 2019-07-08 PROCEDURE — 96375 TX/PRO/DX INJ NEW DRUG ADDON: CPT

## 2019-07-08 PROCEDURE — 81003 URINALYSIS AUTO W/O SCOPE: CPT | Performed by: EMERGENCY MEDICINE

## 2019-07-08 PROCEDURE — 81025 URINE PREGNANCY TEST: CPT | Performed by: EMERGENCY MEDICINE

## 2019-07-08 RX ORDER — MELOXICAM 7.5 MG/1
7.5 TABLET ORAL DAILY
Qty: 14 TABLET | Refills: 0 | Status: SHIPPED | OUTPATIENT
Start: 2019-07-08 | End: 2019-07-22

## 2019-07-08 RX ORDER — ORPHENADRINE CITRATE 30 MG/ML
60 INJECTION INTRAMUSCULAR; INTRAVENOUS ONCE
Status: COMPLETED | OUTPATIENT
Start: 2019-07-08 | End: 2019-07-08

## 2019-07-08 RX ORDER — ORPHENADRINE CITRATE 100 MG/1
100 TABLET, EXTENDED RELEASE ORAL 2 TIMES DAILY
Qty: 20 TABLET | Refills: 0 | Status: SHIPPED | OUTPATIENT
Start: 2019-07-08 | End: 2019-07-18

## 2019-07-08 RX ORDER — KETOROLAC TROMETHAMINE 30 MG/ML
30 INJECTION, SOLUTION INTRAMUSCULAR; INTRAVENOUS ONCE
Status: COMPLETED | OUTPATIENT
Start: 2019-07-08 | End: 2019-07-08

## 2019-07-08 NOTE — ED PROVIDER NOTES
Patient Seen in: [facility] AND [facility] Emergency Department    History   Patient presents with:  Abdomen/Flank Pain (GI/)  Urinary Symptoms (urologic)    Stated Complaint: abd pain?      HPI    50 yo F with PMH HTN presneting for evaluation of LLQ pain for pa daily.   Turmeric 450 MG Oral Cap,  Take by mouth. Levocetirizine Dihydrochloride 5 MG Oral Tab,  Take 1 tablet (5 mg total) by mouth every evening.    Albuterol Sulfate 108 (90 BASE) MCG/ACT Inhalation Aerosol Powder, Breath Activated,  Inhale 2 puffs in vitals reviewed.         ED Course     Labs Reviewed   BASIC METABOLIC PANEL (8) - Abnormal; Notable for the following components:       Result Value    Glucose 108 (*)     BUN 22 (*)     BUN/CREA Ratio 25.6 (*)     Calcium, Total 8.1 (*)     Calculated Osm enhances homogeneously without peripancreatic fluid collection. Multiple uterine fibroids, some which are calcified. Intrauterine device in situ. No adnexal mass is seen. Case discussed with Dr David Rehman at 2:18 AM PST Marylen Ruder, M.D.   This repo R-0    Orphenadrine Citrate  MG Oral Tablet 12 Hr  Take 100 mg by mouth 2 (two) times daily for 10 days. Use caution when taking this medication - it may make you drowsy/dizzy. , Print Script, Disp-20 tablet, R-0

## 2019-07-08 NOTE — ED NOTES
Patient describes a near fall early yesterday. Denies falling to the floor or hitting head. Patient  helped her by pulling her up. Patient states since then has had LLQ pain. No nausea, vomiting or diarrhea. Concern of muscle tearing or injury.

## 2019-07-08 NOTE — ED INITIAL ASSESSMENT (HPI)
Pt c/o LLQ abd pain that started at 1500, and urinary frequency and foul-smelling urine.  Denies N/V/D

## 2019-07-15 ENCOUNTER — OFFICE VISIT (OUTPATIENT)
Dept: OBGYN CLINIC | Facility: CLINIC | Age: 54
End: 2019-07-15

## 2019-07-15 VITALS
DIASTOLIC BLOOD PRESSURE: 70 MMHG | SYSTOLIC BLOOD PRESSURE: 100 MMHG | HEART RATE: 69 BPM | HEIGHT: 65 IN | BODY MASS INDEX: 48.82 KG/M2 | WEIGHT: 293 LBS

## 2019-07-15 DIAGNOSIS — Z01.419 ENCOUNTER FOR GYNECOLOGICAL EXAMINATION WITHOUT ABNORMAL FINDING: Primary | ICD-10-CM

## 2019-07-15 DIAGNOSIS — Z97.5 IUD (INTRAUTERINE DEVICE) IN PLACE: ICD-10-CM

## 2019-07-15 PROCEDURE — 99396 PREV VISIT EST AGE 40-64: CPT | Performed by: OBSTETRICS & GYNECOLOGY

## 2019-07-16 LAB — HPV I/H RISK 1 DNA SPEC QL NAA+PROBE: NEGATIVE

## 2019-07-16 NOTE — PROGRESS NOTES
Nava Pacheco is a 49year old female  No LMP recorded. (Menstrual status: IUD - Intrauterine Device). who presents for Patient presents with:  Gyn Exam: ANNUAL EXAM  Her cycles are  regular. She has no complaints.     OBSTETRICS HISTORY: file        Non-medical: Not on file    Tobacco Use      Smoking status: Never Smoker      Smokeless tobacco: Never Used    Substance and Sexual Activity      Alcohol use: No      Drug use: No      Sexual activity: Yes        Partners: Male        Birth co Orphenadrine Citrate  MG Oral Tablet 12 Hr, Take 100 mg by mouth 2 (two) times daily for 10 days. Use caution when taking this medication - it may make you drowsy/dizzy. , Disp: 20 tablet, Rfl: 0  •  Losartan Potassium-HCTZ 50-12.5 MG Oral Tab, Take currently breastfeeding.     PHYSICAL EXAM:   Constitutional: well developed, well nourished  Head/Face: normocephalic  Neck/Thyroid: thyroid symmetric, no thyromegaly, no nodules, no adenopathy  Lymphatic:no abnormal supraclavicular or axillary adenopathy

## 2019-07-17 LAB — LAST PAP RESULT: NORMAL

## 2019-08-03 ENCOUNTER — LAB ENCOUNTER (OUTPATIENT)
Dept: LAB | Age: 54
End: 2019-08-03
Attending: FAMILY MEDICINE
Payer: COMMERCIAL

## 2019-08-03 ENCOUNTER — OFFICE VISIT (OUTPATIENT)
Dept: FAMILY MEDICINE CLINIC | Facility: CLINIC | Age: 54
End: 2019-08-03

## 2019-08-03 VITALS
RESPIRATION RATE: 17 BRPM | HEART RATE: 54 BPM | HEIGHT: 65 IN | WEIGHT: 293 LBS | DIASTOLIC BLOOD PRESSURE: 92 MMHG | BODY MASS INDEX: 48.82 KG/M2 | SYSTOLIC BLOOD PRESSURE: 142 MMHG

## 2019-08-03 DIAGNOSIS — J45.20 MILD INTERMITTENT ASTHMA WITHOUT COMPLICATION: ICD-10-CM

## 2019-08-03 DIAGNOSIS — D72.829 LEUKOCYTOSIS, UNSPECIFIED TYPE: ICD-10-CM

## 2019-08-03 DIAGNOSIS — I10 ESSENTIAL HYPERTENSION: ICD-10-CM

## 2019-08-03 DIAGNOSIS — E66.01 MORBID OBESITY WITH BMI OF 50.0-59.9, ADULT (HCC): ICD-10-CM

## 2019-08-03 DIAGNOSIS — R94.31 ABNORMAL FINDING ON EKG: Primary | ICD-10-CM

## 2019-08-03 DIAGNOSIS — Z96.1 PSEUDOPHAKIA OF BOTH EYES: ICD-10-CM

## 2019-08-03 LAB
ALBUMIN SERPL-MCNC: 3.4 G/DL (ref 3.4–5)
ALBUMIN/GLOB SERPL: 0.8 {RATIO} (ref 1–2)
ALP LIVER SERPL-CCNC: 104 U/L (ref 41–108)
ALT SERPL-CCNC: 18 U/L (ref 13–56)
ANION GAP SERPL CALC-SCNC: 7 MMOL/L (ref 0–18)
AST SERPL-CCNC: 20 U/L (ref 15–37)
BASOPHILS # BLD AUTO: 0.07 X10(3) UL (ref 0–0.2)
BASOPHILS NFR BLD AUTO: 0.8 %
BILIRUB SERPL-MCNC: 0.3 MG/DL (ref 0.1–2)
BILIRUB UR QL: NEGATIVE
BUN BLD-MCNC: 24 MG/DL (ref 7–18)
BUN/CREAT SERPL: 32 (ref 10–20)
CALCIUM BLD-MCNC: 7.7 MG/DL (ref 8.5–10.1)
CHLORIDE SERPL-SCNC: 107 MMOL/L (ref 98–112)
CHOLEST SMN-MCNC: 148 MG/DL (ref ?–200)
CO2 SERPL-SCNC: 27 MMOL/L (ref 21–32)
COLOR UR: YELLOW
CREAT BLD-MCNC: 0.75 MG/DL (ref 0.55–1.02)
DEPRECATED RDW RBC AUTO: 48.8 FL (ref 35.1–46.3)
EOSINOPHIL # BLD AUTO: 0.14 X10(3) UL (ref 0–0.7)
EOSINOPHIL NFR BLD AUTO: 1.7 %
ERYTHROCYTE [DISTWIDTH] IN BLOOD BY AUTOMATED COUNT: 14.9 % (ref 11–15)
GLOBULIN PLAS-MCNC: 4.2 G/DL (ref 2.8–4.4)
GLUCOSE BLD-MCNC: 89 MG/DL (ref 70–99)
GLUCOSE UR-MCNC: NEGATIVE MG/DL
HCT VFR BLD AUTO: 37.2 % (ref 35–48)
HDLC SERPL-MCNC: 63 MG/DL (ref 40–59)
HGB BLD-MCNC: 12.1 G/DL (ref 12–16)
HGB UR QL STRIP.AUTO: NEGATIVE
IMM GRANULOCYTES # BLD AUTO: 0.02 X10(3) UL (ref 0–1)
IMM GRANULOCYTES NFR BLD: 0.2 %
KETONES UR-MCNC: NEGATIVE MG/DL
LDLC SERPL CALC-MCNC: 76 MG/DL (ref ?–100)
LYMPHOCYTES # BLD AUTO: 2.98 X10(3) UL (ref 1–4)
LYMPHOCYTES NFR BLD AUTO: 35.7 %
M PROTEIN MFR SERPL ELPH: 7.6 G/DL (ref 6.4–8.2)
MCH RBC QN AUTO: 28.9 PG (ref 26–34)
MCHC RBC AUTO-ENTMCNC: 32.5 G/DL (ref 31–37)
MCV RBC AUTO: 88.8 FL (ref 80–100)
MONOCYTES # BLD AUTO: 0.62 X10(3) UL (ref 0.1–1)
MONOCYTES NFR BLD AUTO: 7.4 %
NEUTROPHILS # BLD AUTO: 4.51 X10 (3) UL (ref 1.5–7.7)
NEUTROPHILS # BLD AUTO: 4.51 X10(3) UL (ref 1.5–7.7)
NEUTROPHILS NFR BLD AUTO: 54.2 %
NITRITE UR QL STRIP.AUTO: NEGATIVE
NONHDLC SERPL-MCNC: 85 MG/DL (ref ?–130)
OSMOLALITY SERPL CALC.SUM OF ELEC: 296 MOSM/KG (ref 275–295)
PATIENT FASTING: YES
PATIENT FASTING: YES
PH UR: 5 [PH] (ref 5–8)
PLATELET # BLD AUTO: 232 10(3)UL (ref 150–450)
POTASSIUM SERPL-SCNC: 3.6 MMOL/L (ref 3.5–5.1)
PROT UR-MCNC: NEGATIVE MG/DL
RBC # BLD AUTO: 4.19 X10(6)UL (ref 3.8–5.3)
RBC #/AREA URNS AUTO: 1 /HPF
SODIUM SERPL-SCNC: 141 MMOL/L (ref 136–145)
SP GR UR STRIP: 1.02 (ref 1–1.03)
TRIGL SERPL-MCNC: 45 MG/DL (ref 30–149)
TSI SER-ACNC: 1.37 MIU/ML (ref 0.36–3.74)
UROBILINOGEN UR STRIP-ACNC: <2
VIT C UR-MCNC: NEGATIVE MG/DL
VLDLC SERPL CALC-MCNC: 9 MG/DL (ref 0–30)
WBC # BLD AUTO: 8.3 X10(3) UL (ref 4–11)
WBC #/AREA URNS AUTO: 3 /HPF

## 2019-08-03 PROCEDURE — 80061 LIPID PANEL: CPT

## 2019-08-03 PROCEDURE — 99214 OFFICE O/P EST MOD 30 MIN: CPT | Performed by: FAMILY MEDICINE

## 2019-08-03 PROCEDURE — 85025 COMPLETE CBC W/AUTO DIFF WBC: CPT

## 2019-08-03 PROCEDURE — 80053 COMPREHEN METABOLIC PANEL: CPT

## 2019-08-03 PROCEDURE — 84443 ASSAY THYROID STIM HORMONE: CPT

## 2019-08-03 PROCEDURE — 81001 URINALYSIS AUTO W/SCOPE: CPT

## 2019-08-03 PROCEDURE — 36415 COLL VENOUS BLD VENIPUNCTURE: CPT

## 2019-08-03 RX ORDER — AZITHROMYCIN 250 MG/1
TABLET, FILM COATED ORAL
Qty: 6 TABLET | Refills: 0 | Status: SHIPPED | OUTPATIENT
Start: 2019-08-03 | End: 2019-10-03 | Stop reason: ALTCHOICE

## 2019-08-03 RX ORDER — LOSARTAN POTASSIUM AND HYDROCHLOROTHIAZIDE 12.5; 5 MG/1; MG/1
1 TABLET ORAL DAILY
Qty: 90 TABLET | Refills: 3 | Status: SHIPPED | OUTPATIENT
Start: 2019-08-03 | End: 2020-02-10

## 2019-08-03 RX ORDER — PROMETHAZINE HYDROCHLORIDE 25 MG/1
TABLET ORAL
Qty: 300 VIAL | Refills: 6 | Status: SHIPPED | OUTPATIENT
Start: 2019-08-03 | End: 2019-10-03 | Stop reason: ALTCHOICE

## 2019-08-03 NOTE — PATIENT INSTRUCTIONS
In order to give a better assessment regarding the status of this patient's heart with regards to hypertension we will order echocardiogram.  Medication reviewed and renewed where needed and appropriate. Monitor blood pressures and record at home.  Limit s

## 2019-08-03 NOTE — PROGRESS NOTES
HPI:    Patient ID: Clif Slater is a 49year old female.     Patient is a delightful 52-year-old -American female well-known to the clinic in which she is treated for hypertension and asthma and she is currently under the ophthalmologist mouth every evening. Disp: 30 tablet Rfl: 1   Albuterol Sulfate 108 (90 BASE) MCG/ACT Inhalation Aerosol Powder, Breath Activated Inhale 2 puffs into the lungs 4 (four) times daily as needed.  Disp: 1 each Rfl: 2   albuterol sulfate (2.5 MG/3ML) 0.083% Jacqueline Ruiz Tab 90 tablet 3     Sig: Take 1 tablet by mouth daily.        Imaging & Referrals:  CARD ECHO 2D DOPPLER CONTRAST (CPT=93306)  Patient Instructions   In order to give a better assessment regarding the status of this patient's heart with regards to hypertens

## 2019-08-26 ENCOUNTER — NURSE TRIAGE (OUTPATIENT)
Dept: FAMILY MEDICINE CLINIC | Facility: CLINIC | Age: 54
End: 2019-08-26

## 2019-08-26 DIAGNOSIS — N39.0 URINARY TRACT INFECTION WITHOUT HEMATURIA, SITE UNSPECIFIED: Primary | ICD-10-CM

## 2019-08-26 RX ORDER — CIPROFLOXACIN 250 MG/1
250 TABLET, FILM COATED ORAL 2 TIMES DAILY
Qty: 14 TABLET | Refills: 0 | Status: SHIPPED | OUTPATIENT
Start: 2019-08-26 | End: 2019-10-03 | Stop reason: ALTCHOICE

## 2019-08-26 RX ORDER — FLUCONAZOLE 150 MG/1
150 TABLET ORAL ONCE
Qty: 1 TABLET | Refills: 0 | Status: SHIPPED | OUTPATIENT
Start: 2019-08-26 | End: 2019-08-26

## 2019-08-26 NOTE — TELEPHONE ENCOUNTER
Dr. Barbie Mcneill: patient asked if you can send RX for diflucan also in case she gets yeast infection while on cipro. Patient states you usually send both.

## 2019-08-26 NOTE — TELEPHONE ENCOUNTER
Dr Davin Proctor, please advise. Patient called back, asking if doctor responded. Allergy list, pharmacy verified. Advised patient that will call as soon as doctor responds; she was most gracious, verbalized understanding.

## 2019-08-26 NOTE — TELEPHONE ENCOUNTER
Action Requested: Summary for Provider     []  Critical Lab, Recommendations Needed  [] Need Additional Advice  []   FYI    []   Need Orders  [x] Need Medications Sent to Pharmacy  []  Other     SUMMARY: Patient is requesting an antibiotic to be sent to th

## 2019-08-27 NOTE — TELEPHONE ENCOUNTER
No answer and left detailed message at home voice mailbox [phone] r/t Dr Minh Chaudhry orders. If any questions call nurse at [phone]. Pt has not read her [employer] message r/t antibiotic. [employer] message on the diflucan refill sent to pt.

## 2019-09-04 ENCOUNTER — OFFICE VISIT (OUTPATIENT)
Dept: OBGYN CLINIC | Facility: CLINIC | Age: 54
End: 2019-09-04

## 2019-09-04 ENCOUNTER — HOSPITAL ENCOUNTER (OUTPATIENT)
Dept: CV DIAGNOSTICS | Facility: HOSPITAL | Age: 54
Discharge: HOME | End: 2019-09-04
Attending: FAMILY MEDICINE
Payer: COMMERCIAL

## 2019-09-04 VITALS
BODY MASS INDEX: 48.82 KG/M2 | HEIGHT: 65 IN | WEIGHT: 293 LBS | DIASTOLIC BLOOD PRESSURE: 78 MMHG | SYSTOLIC BLOOD PRESSURE: 118 MMHG | HEART RATE: 68 BPM

## 2019-09-04 DIAGNOSIS — I10 ESSENTIAL HYPERTENSION: ICD-10-CM

## 2019-09-04 DIAGNOSIS — Z30.433 ENCOUNTER FOR REMOVAL AND REINSERTION OF INTRAUTERINE CONTRACEPTIVE DEVICE: Primary | ICD-10-CM

## 2019-09-04 DIAGNOSIS — R94.31 ABNORMAL FINDING ON EKG: ICD-10-CM

## 2019-09-04 PROBLEM — Z97.5 IUD (INTRAUTERINE DEVICE) IN PLACE: Status: ACTIVE | Noted: 2019-09-04

## 2019-09-04 PROCEDURE — 93306 TTE W/DOPPLER COMPLETE: CPT | Performed by: FAMILY MEDICINE

## 2019-09-04 PROCEDURE — 58301 REMOVE INTRAUTERINE DEVICE: CPT | Performed by: OBSTETRICS & GYNECOLOGY

## 2019-09-04 PROCEDURE — 58300 INSERT INTRAUTERINE DEVICE: CPT | Performed by: OBSTETRICS & GYNECOLOGY

## 2019-09-04 NOTE — PROCEDURES
IUD Removal & Insertion     Birth control method(s) used:  mirena    Consent signed. Procedure discussed with the patient in detail including indication, risks, benefits, alternatives and complications.     Pelvic Exam Findings:  Lesion description:  IUD

## 2019-09-16 ENCOUNTER — TELEPHONE (OUTPATIENT)
Dept: FAMILY MEDICINE CLINIC | Facility: CLINIC | Age: 54
End: 2019-09-16

## 2019-09-16 DIAGNOSIS — R93.1 ABNORMAL ECHOCARDIOGRAM: Primary | ICD-10-CM

## 2019-09-16 NOTE — TELEPHONE ENCOUNTER
Spoke with patient ( verified) and relayed Dr. Brigida Lundberg message below--patient verbalizes understanding and agreement. No further questions/concerns at this time.     Referral pended for sign off with Dx: abnormal echocardiogram    Patient requests ret

## 2019-09-16 NOTE — TELEPHONE ENCOUNTER
Pt called about the Echo test she took, and she is calling about results she saw on mohchit and to see what is next    Please call

## 2019-09-16 NOTE — TELEPHONE ENCOUNTER
Spoke with patient ( verified) and relayed Dr. Mckeon Divers message below--patient verbalizes understanding and agreement. Copy of referral sent to patient via [employer] per her request. No further questions/concerns at this time.

## 2019-10-03 ENCOUNTER — OFFICE VISIT (OUTPATIENT)
Dept: CARDIOLOGY CLINIC | Facility: CLINIC | Age: 54
End: 2019-10-03

## 2019-10-03 ENCOUNTER — TELEPHONE (OUTPATIENT)
Dept: CARDIOLOGY CLINIC | Facility: CLINIC | Age: 54
End: 2019-10-03

## 2019-10-03 VITALS
RESPIRATION RATE: 22 BRPM | BODY MASS INDEX: 59 KG/M2 | WEIGHT: 293 LBS | HEART RATE: 54 BPM | SYSTOLIC BLOOD PRESSURE: 103 MMHG | OXYGEN SATURATION: 96 % | DIASTOLIC BLOOD PRESSURE: 71 MMHG

## 2019-10-03 DIAGNOSIS — I34.0 MITRAL VALVE INSUFFICIENCY, UNSPECIFIED ETIOLOGY: ICD-10-CM

## 2019-10-03 DIAGNOSIS — E66.01 CLASS 3 SEVERE OBESITY DUE TO EXCESS CALORIES WITH BODY MASS INDEX (BMI) OF 50.0 TO 59.9 IN ADULT, UNSPECIFIED WHETHER SERIOUS COMORBIDITY PRESENT (HCC): ICD-10-CM

## 2019-10-03 DIAGNOSIS — I10 ESSENTIAL HYPERTENSION: ICD-10-CM

## 2019-10-03 DIAGNOSIS — I10 ESSENTIAL HYPERTENSION: Primary | ICD-10-CM

## 2019-10-03 DIAGNOSIS — G47.33 OBSTRUCTIVE SLEEP APNEA SYNDROME: Primary | ICD-10-CM

## 2019-10-03 DIAGNOSIS — G47.33 OBSTRUCTIVE SLEEP APNEA SYNDROME: ICD-10-CM

## 2019-10-03 PROBLEM — E66.813 CLASS 3 SEVERE OBESITY DUE TO EXCESS CALORIES WITH BODY MASS INDEX (BMI) OF 50.0 TO 59.9 IN ADULT: Status: ACTIVE | Noted: 2019-10-03

## 2019-10-03 PROBLEM — E66.813 CLASS 3 SEVERE OBESITY DUE TO EXCESS CALORIES WITH BODY MASS INDEX (BMI) OF 50.0 TO 59.9 IN ADULT (HCC): Status: ACTIVE | Noted: 2019-10-03

## 2019-10-03 PROCEDURE — 99245 OFF/OP CONSLTJ NEW/EST HI 55: CPT | Performed by: INTERNAL MEDICINE

## 2019-10-03 NOTE — H&P
[facility], [facility]    Cardiac Electrophysiology Consultation  10/3/2019    Name:  Mahesh Negron  : 5/15/1965    Date of consultation:   10/3/2019    Referring physician: Dr. Marcelino Ramírez    Reason for Consultation:  Abnormal echocardiogram    Histor drugs. Allergies:  No Known Allergies    Current Outpatient Medications on File Prior to Visit:  Losartan Potassium-HCTZ 50-12.5 MG Oral Tab Take 1 tablet by mouth daily.  Disp: 90 tablet Rfl: 3   Spacer/Aero Chamber Mouthpiece Does not apply Misc To be x 3.  No dysarthria, facial droop, or gross motor deficits.     Laboratory Data:    ECHO: Mild LVH, EF 63%, mild-mod MR    Lab Results   Component Value Date    WBC 8.3 08/03/2019    RBC 4.19 08/03/2019    HGB 12.1 08/03/2019    HCT 37.2 08/03/2019    MCV 8 for the consultation.      Jaycob Gutierrez MD  Cardiology/Cardiac Electrophysiology  10/3/2019

## 2019-10-03 NOTE — PATIENT INSTRUCTIONS
-Sleep study, then meet with sleep specialist for treatment  -Light-moderate aerobic exercise for 30-45 minutes most days of the week  -Attention to diet and calorie restriction for weight loss  -Follow-up with cardiology as scheduled

## 2019-10-13 ENCOUNTER — OFFICE VISIT (OUTPATIENT)
Dept: SLEEP CENTER | Age: 54
End: 2019-10-13
Attending: INTERNAL MEDICINE
Payer: COMMERCIAL

## 2019-10-13 DIAGNOSIS — I34.0 MITRAL VALVE INSUFFICIENCY, UNSPECIFIED ETIOLOGY: ICD-10-CM

## 2019-10-13 DIAGNOSIS — E66.01 CLASS 3 SEVERE OBESITY DUE TO EXCESS CALORIES WITH BODY MASS INDEX (BMI) OF 50.0 TO 59.9 IN ADULT, UNSPECIFIED WHETHER SERIOUS COMORBIDITY PRESENT (HCC): ICD-10-CM

## 2019-10-13 DIAGNOSIS — G47.33 OBSTRUCTIVE SLEEP APNEA SYNDROME: ICD-10-CM

## 2019-10-13 DIAGNOSIS — I10 ESSENTIAL HYPERTENSION: ICD-10-CM

## 2019-10-13 PROCEDURE — 95806 SLEEP STUDY UNATT&RESP EFFT: CPT

## 2019-10-15 NOTE — PROCEDURES
[address] [address]       Accredited by the [facility] of Sleep Medicine (AASM)    PATIENT'S NAME:        MARLENE Bautista  ATTENDING PHYSICIAN:   Treva Lopez M.D.   REFERRING PHYSICIAN:   Beena Yeung M.D. Alternatively, the patient may benefit from the use of an oral mandibular advancement device as prescribed by a dentist or combination of aggressive weight loss along with avoidance of supine sleep.   2.   The patient should avoid alcohol and sedative medic

## 2019-10-17 ENCOUNTER — TELEPHONE (OUTPATIENT)
Dept: CARDIOLOGY CLINIC | Facility: CLINIC | Age: 54
End: 2019-10-17

## 2019-10-17 NOTE — TELEPHONE ENCOUNTER
----- Message from ALONZO Dhaliwal sent at 10/15/2019  1:53 PM CDT -----  Sleep study shows sleep apnea, make sure pt has appt wit pulmonary for CPAP workup

## 2019-10-21 ENCOUNTER — TELEPHONE (OUTPATIENT)
Dept: FAMILY MEDICINE CLINIC | Facility: CLINIC | Age: 54
End: 2019-10-21

## 2019-10-21 DIAGNOSIS — H18.603 KERATOCONUS OF BOTH EYES: Primary | ICD-10-CM

## 2019-10-21 NOTE — TELEPHONE ENCOUNTER
Dr. Jm Vega, patient is requesting a referral for Dr. Arpit Bradshaw.  Please advise on referral. Route message to: [address]

## 2019-10-21 NOTE — TELEPHONE ENCOUNTER
Spouse called in requesting referral for provider below. He states that he believes the provider is within network. Diagnosis: Keratoconus    Dr. Chente Johnson MD  [facility]  PH# [phone]    Patient is not currently scheduled.

## 2019-11-30 ENCOUNTER — OFFICE VISIT (OUTPATIENT)
Dept: FAMILY MEDICINE CLINIC | Facility: CLINIC | Age: 54
End: 2019-11-30

## 2019-11-30 VITALS
SYSTOLIC BLOOD PRESSURE: 140 MMHG | DIASTOLIC BLOOD PRESSURE: 88 MMHG | HEART RATE: 66 BPM | TEMPERATURE: 98 F | WEIGHT: 293 LBS | BODY MASS INDEX: 58 KG/M2 | RESPIRATION RATE: 18 BRPM

## 2019-11-30 DIAGNOSIS — I10 ESSENTIAL HYPERTENSION: ICD-10-CM

## 2019-11-30 DIAGNOSIS — J45.20 MILD INTERMITTENT ASTHMA WITHOUT COMPLICATION: ICD-10-CM

## 2019-11-30 DIAGNOSIS — Z99.89 OSA ON CPAP: ICD-10-CM

## 2019-11-30 DIAGNOSIS — G47.33 OSA ON CPAP: ICD-10-CM

## 2019-11-30 DIAGNOSIS — Z12.39 BREAST CANCER SCREENING: Primary | ICD-10-CM

## 2019-11-30 DIAGNOSIS — E66.01 MORBID OBESITY WITH BMI OF 50.0-59.9, ADULT (HCC): ICD-10-CM

## 2019-11-30 PROCEDURE — 99214 OFFICE O/P EST MOD 30 MIN: CPT | Performed by: FAMILY MEDICINE

## 2019-11-30 RX ORDER — PROMETHAZINE HYDROCHLORIDE 25 MG/1
TABLET ORAL
Qty: 15 ML | Refills: 6 | Status: SHIPPED | OUTPATIENT
Start: 2019-11-30 | End: 2020-07-13

## 2019-11-30 RX ORDER — LOSARTAN POTASSIUM 50 MG/1
50 TABLET ORAL DAILY
Qty: 90 TABLET | Refills: 1 | Status: SHIPPED | OUTPATIENT
Start: 2019-11-30 | End: 2020-04-01

## 2019-11-30 RX ORDER — PROMETHAZINE HYDROCHLORIDE 25 MG/1
TABLET ORAL
Refills: 6 | COMMUNITY
Start: 2019-10-09 | End: 2019-11-30

## 2019-11-30 RX ORDER — HYDROCHLOROTHIAZIDE 12.5 MG/1
12.5 TABLET ORAL DAILY
Qty: 90 TABLET | Refills: 1 | Status: SHIPPED | OUTPATIENT
Start: 2019-11-30 | End: 2020-04-01

## 2019-11-30 NOTE — PATIENT INSTRUCTIONS
Medication reviewed and renewed where needed and appropriate. Monitor blood pressures and record at home. Limit salt intake. Comply with medications. Encouraged physical fitness and daily physical activity daily.   Recommend weight loss via daily exercis

## 2019-11-30 NOTE — PROGRESS NOTES
HPI:    Patient ID: Codey Dunn is a 49year old female.     Patient is a 41-year-old -American female well-known to the clinic treated for hypertension as well as asthma and currently receiving eye care from the ophthalmologist.  Also th Nose: Mucosal edema present. Mouth/Throat: Oropharynx is clear and moist.   Cardiovascular: Normal rate, regular rhythm and normal heart sounds. Pulmonary/Chest: Effort normal and breath sounds normal. No respiratory distress.    Musculoskeletal: 1     Sig: Take 1 tablet (12.5 mg total) by mouth daily. Imaging & Referrals:  [facility] SCREENING BILAT (CBH=[id number])  Patient Instructions   Medication reviewed and renewed where needed and appropriate. Monitor blood pressures and record at home.  Limit sa

## 2019-12-07 ENCOUNTER — HOSPITAL ENCOUNTER (OUTPATIENT)
Dept: MAMMOGRAPHY | Age: 54
Discharge: HOME | End: 2019-12-07
Attending: FAMILY MEDICINE
Payer: COMMERCIAL

## 2019-12-07 DIAGNOSIS — Z12.39 BREAST CANCER SCREENING: ICD-10-CM

## 2019-12-07 PROCEDURE — 77063 BREAST TOMOSYNTHESIS BI: CPT | Performed by: FAMILY MEDICINE

## 2019-12-07 PROCEDURE — 77067 SCR MAMMO BI INCL CAD: CPT | Performed by: FAMILY MEDICINE

## 2019-12-13 ENCOUNTER — OFFICE VISIT (OUTPATIENT)
Dept: SLEEP CENTER | Age: 54
End: 2019-12-13
Attending: INTERNAL MEDICINE
Payer: COMMERCIAL

## 2019-12-13 PROCEDURE — 95811 POLYSOM 6/>YRS CPAP 4/> PARM: CPT

## 2019-12-20 ENCOUNTER — TELEPHONE (OUTPATIENT)
Dept: FAMILY MEDICINE CLINIC | Facility: CLINIC | Age: 54
End: 2019-12-20

## 2019-12-20 DIAGNOSIS — H18.613 STABLE KERATOCONUS OF BOTH EYES: Primary | ICD-10-CM

## 2019-12-20 NOTE — PROCEDURES
[address] [address]       Accredited by the [facility] of Sleep Medicine (AASM)    PATIENT'S NAME:        Ernest Severin, BERNADETTE  ATTENDING PHYSICIAN:   Francia Parker M.D.   REFERRING PHYSICIAN:   Francia Parker M.D. MEASURES:  The patient was initiated on CPAP at 5 cm of water and titrated up to a final pressure of 12 cm of water. However, it appeared that at 11 cm of water, patient was able to achieve a brief period of REM sleep.   On that setting, patient had an ove

## 2019-12-20 NOTE — TELEPHONE ENCOUNTER
Patient requesting a referral for Dr. Freda Mena from [facility] ophthalmology and states referral must say, to cover contact lenses and materials. Patient states Dr. Savage First referred her to Dr. Emilio Gaspar and Dr. Emilio Gaspar advised patient to contact Dr. Savage First for this referral request and states can be faxed to: Alivia Jasso  and please upload to [employer].     Diagnosis code H18.613  CPT code

## 2019-12-23 NOTE — TELEPHONE ENCOUNTER
Dr. Levi Dhillon, see message below. Referral has been pended.      Please reply to pool:  [facility] - MAIN

## 2019-12-24 NOTE — TELEPHONE ENCOUNTER
Hi Dr. Faviola Newell,    I will need the OV notes from Dr. Jami Castro from [facility] ophthalmology in order to submit to [facility] for authorization. Please have clinical staff obtain OV notes and fax to [phone].     Thank you  Andrew Haas

## 2020-01-03 NOTE — TELEPHONE ENCOUNTER
Patient called to follow up. Informed patient we need office visit notes from [facility]. She states she will fax them over.

## 2020-01-13 ENCOUNTER — TELEPHONE (OUTPATIENT)
Dept: CASE MANAGEMENT | Age: 55
End: 2020-01-13

## 2020-01-13 NOTE — TELEPHONE ENCOUNTER
I think we have been through this before. I do not really understand why the patient does not know that she cannot go to you or by regarding her specialty knee. Please inform this patient of her limitations, and then I will refer her within the system.

## 2020-01-13 NOTE — TELEPHONE ENCOUNTER
Ernesto Ontiveros,    I am working on the referral you submitted for [facility] and this is the response I received from [facility]. Can you please refer to one of the tertiary facilities in network and please advise patient of change. Thank you  Albert Hays    Message -----  From: Leatha Orr Sent: 1/10/2020   2:53 PM CST  To: [facility]  Subject: OUT OF NETWORK                                   [facility] is not a contracted tertiary for patient's plan. There is no approvals for consultation with this provider. Patient should be referred to Gisella Gallardo, or R[facility]h. Please advise.

## 2020-01-14 ENCOUNTER — TELEPHONE (OUTPATIENT)
Dept: OPHTHALMOLOGY | Facility: CLINIC | Age: 55
End: 2020-01-14

## 2020-01-14 NOTE — TELEPHONE ENCOUNTER
Per Doctor Alanis Cardenas we are not affiliated with a lab that will make this speciality lens. Letter sent to pt o let her know we cant order the lens.

## 2020-01-26 ENCOUNTER — HOSPITAL ENCOUNTER (OUTPATIENT)
Age: 55
Discharge: HOME | End: 2020-01-26
Attending: EMERGENCY MEDICINE
Payer: COMMERCIAL

## 2020-01-26 VITALS
HEART RATE: 81 BPM | SYSTOLIC BLOOD PRESSURE: 127 MMHG | RESPIRATION RATE: 18 BRPM | OXYGEN SATURATION: 100 % | TEMPERATURE: 99 F | DIASTOLIC BLOOD PRESSURE: 76 MMHG

## 2020-01-26 DIAGNOSIS — J02.0 ACUTE STREPTOCOCCAL PHARYNGITIS: ICD-10-CM

## 2020-01-26 DIAGNOSIS — J11.1 INFLUENZA-LIKE ILLNESS: Primary | ICD-10-CM

## 2020-01-26 LAB
POCT INFLUENZA A: NEGATIVE
POCT INFLUENZA B: NEGATIVE
S PYO AG THROAT QL: POSITIVE

## 2020-01-26 PROCEDURE — 87430 STREP A AG IA: CPT

## 2020-01-26 PROCEDURE — 87502 INFLUENZA DNA AMP PROBE: CPT | Performed by: EMERGENCY MEDICINE

## 2020-01-26 PROCEDURE — 99214 OFFICE O/P EST MOD 30 MIN: CPT

## 2020-01-26 PROCEDURE — 99213 OFFICE O/P EST LOW 20 MIN: CPT

## 2020-01-26 RX ORDER — BENZONATATE 200 MG/1
200 CAPSULE ORAL 3 TIMES DAILY PRN
Qty: 15 CAPSULE | Refills: 0 | Status: SHIPPED | OUTPATIENT
Start: 2020-01-26 | End: 2020-07-13 | Stop reason: ALTCHOICE

## 2020-01-26 RX ORDER — FLUCONAZOLE 150 MG/1
TABLET ORAL
Qty: 2 TABLET | Refills: 0 | Status: SHIPPED | OUTPATIENT
Start: 2020-01-26 | End: 2020-02-10 | Stop reason: ALTCHOICE

## 2020-01-26 RX ORDER — AZITHROMYCIN 500 MG/1
500 TABLET, FILM COATED ORAL DAILY
Qty: 5 TABLET | Refills: 0 | Status: SHIPPED | OUTPATIENT
Start: 2020-01-26 | End: 2020-01-31

## 2020-01-26 NOTE — ED INITIAL ASSESSMENT (HPI)
Pt here with complaints of cough congestion that has been going for 2 days now, pt states she is having sinus pressure and has lost her voice , pt denies any sob or cp

## 2020-01-26 NOTE — ED PROVIDER NOTES
Patient Seen in: [address] [facility] [address]      History   Patient presents with:  Cough/URI    Stated Complaint: flu like symptoms    HPI    Patient is a 60-year-old female with a history of asthma, obesity, presents with complaints of Left 5/31/17    [facility]                Family history reviewed with patient/caregiver and is not pertinent to presenting problem.     Social History    Tobacco Use      Smoking status: Never Smoker      Smokeless tobacco: Never Used    Alcohol use: No    Drug u tract infection with cough. Will treat symptomatically and have the patient follow-up with PMD in 1 week for repeat evaluation.               Disposition and Plan     Clinical Impression:  Influenza-like illness  (primary encounter diagnosis)  Acute strept

## 2020-02-10 ENCOUNTER — TELEPHONE (OUTPATIENT)
Dept: OTHER | Age: 55
End: 2020-02-10

## 2020-02-10 ENCOUNTER — OFFICE VISIT (OUTPATIENT)
Dept: FAMILY MEDICINE CLINIC | Facility: CLINIC | Age: 55
End: 2020-02-10

## 2020-02-10 VITALS
SYSTOLIC BLOOD PRESSURE: 129 MMHG | DIASTOLIC BLOOD PRESSURE: 85 MMHG | RESPIRATION RATE: 17 BRPM | HEART RATE: 66 BPM | HEIGHT: 65 IN | TEMPERATURE: 98 F | BODY MASS INDEX: 58 KG/M2

## 2020-02-10 DIAGNOSIS — J21.9 ACUTE BRONCHIOLITIS DUE TO UNSPECIFIED ORGANISM: Primary | ICD-10-CM

## 2020-02-10 DIAGNOSIS — B37.3 YEAST VAGINITIS: Primary | ICD-10-CM

## 2020-02-10 DIAGNOSIS — J02.0 STREP PHARYNGITIS: ICD-10-CM

## 2020-02-10 PROCEDURE — 99214 OFFICE O/P EST MOD 30 MIN: CPT | Performed by: FAMILY MEDICINE

## 2020-02-10 RX ORDER — PENICILLIN V POTASSIUM 500 MG/1
500 TABLET ORAL 4 TIMES DAILY
Qty: 40 TABLET | Refills: 0 | Status: SHIPPED | OUTPATIENT
Start: 2020-02-10 | End: 2020-07-13 | Stop reason: ALTCHOICE

## 2020-02-10 RX ORDER — DEXTROMETHORPHAN HYDROBROMIDE AND PROMETHAZINE HYDROCHLORIDE 15; 6.25 MG/5ML; MG/5ML
5 SYRUP ORAL 4 TIMES DAILY PRN
Qty: 240 ML | Refills: 0 | Status: SHIPPED | OUTPATIENT
Start: 2020-02-10 | End: 2020-07-13 | Stop reason: ALTCHOICE

## 2020-02-10 RX ORDER — FLUCONAZOLE 150 MG/1
150 TABLET ORAL ONCE
Qty: 1 TABLET | Refills: 0 | Status: SHIPPED | OUTPATIENT
Start: 2020-02-10 | End: 2020-02-10

## 2020-02-10 NOTE — PROGRESS NOTES
HPI:    Patient ID: Robbie Gan is a 49year old female. Patient is a 45-year-old -American female who was recently seen in urgent care center and treated with a Z-Gagan for confirmed strep culture of her throat.   The patient has comple 129/85   Pulse: 66   Resp: 17   Temp: 98.2 °F (36.8 °C)       PHYSICAL EXAM:   Physical Exam    Constitutional: She is oriented to person, place, and time and obese. No distress.    HENT:   Right Ear: Tympanic membrane and ear canal normal.   Left Ear: Tymp normal

## 2020-02-10 NOTE — TELEPHONE ENCOUNTER
Patient seen in [address] today with Dr. Micheline Jacobs and she is taking penicillin and states, \"usually, when I take this I tend to get a yeast infection. Can Dr. Micheline Jacobs send a medication in for me for a possible yeast infection? \"    Pended med for review/ approv

## 2020-02-10 NOTE — PATIENT INSTRUCTIONS
Clear fluid hydration. Rest. Take all medications as prescribed. If symptoms persist or worsen please call or return to clinic ASAP. Penicillin  mg tablets to be taken 4 times daily for 10 days.   Promethazine DM cough syrup prescribed 1 teaspoon ora

## 2020-03-18 ENCOUNTER — NURSE TRIAGE (OUTPATIENT)
Dept: OTHER | Age: 55
End: 2020-03-18

## 2020-03-18 NOTE — TELEPHONE ENCOUNTER
Action Requested: Summary for Provider     []  Critical Lab, Recommendations Needed  [] Need Additional Advice  []   FYI    []   Need Orders  [] Need Medications Sent to Pharmacy  []  Other     SUMMARY: pt reports nasal congestion and mild productive cough

## 2020-03-18 NOTE — TELEPHONE ENCOUNTER
Spoke with patient. Fever down to 99. Has cough and nasal congestion. Advised to stay at home, increase fluids, Tylenol for fever. May use Vit C and Zinc.  Call if symptoms worsen.   To ER if short of breath

## 2020-03-26 ENCOUNTER — NURSE TRIAGE (OUTPATIENT)
Dept: OTHER | Age: 55
End: 2020-03-26

## 2020-03-26 DIAGNOSIS — J45.20 MILD INTERMITTENT ASTHMA WITHOUT COMPLICATION: ICD-10-CM

## 2020-03-26 PROCEDURE — 99442 PHONE E/M BY PHYS 11-20 MIN: CPT | Performed by: FAMILY MEDICINE

## 2020-03-26 NOTE — TELEPHONE ENCOUNTER
Virtual/Telephone Check-In    [facility] verbally consents to a Virtual/Telephone Check-In service on 03/26/20.   Patient understands and accepts financial responsibility for any deductible, co-insurance and/or co-pays associated with this serv

## 2020-03-26 NOTE — TELEPHONE ENCOUNTER
Action Requested: Summary for Provider     []  Critical Lab, Recommendations Needed  [x] Need Additional Advice  []   FYI    []   Need Orders  [] Need Medications Sent to Pharmacy  []  Other     SUMMARY: Patient calling to report multiple issues: mild-garrett like a refill of her albuterol inhaler to have on hand. Pending. Advised patient to stay at home. Drink plenty of fluids, monitory symptoms and call back if she develops respiratory symptoms.  Monitor her pelvic pain and call if pain increases or vagina

## 2020-04-01 RX ORDER — LOSARTAN POTASSIUM 50 MG/1
TABLET ORAL
Qty: 90 TABLET | Refills: 1 | Status: SHIPPED | OUTPATIENT
Start: 2020-04-01 | End: 2020-07-13

## 2020-04-01 RX ORDER — HYDROCHLOROTHIAZIDE 12.5 MG/1
TABLET ORAL
Qty: 90 TABLET | Refills: 1 | Status: SHIPPED | OUTPATIENT
Start: 2020-04-01 | End: 2020-07-13

## 2020-04-18 ENCOUNTER — VIRTUAL PHONE E/M (OUTPATIENT)
Dept: FAMILY MEDICINE CLINIC | Facility: CLINIC | Age: 55
End: 2020-04-18

## 2020-04-18 DIAGNOSIS — J06.9 UPPER RESPIRATORY TRACT INFECTION, UNSPECIFIED TYPE: Primary | ICD-10-CM

## 2020-04-18 DIAGNOSIS — N93.9 VAGINAL SPOTTING: ICD-10-CM

## 2020-04-18 DIAGNOSIS — J30.9 ALLERGIC RHINITIS, UNSPECIFIED SEASONALITY, UNSPECIFIED TRIGGER: ICD-10-CM

## 2020-04-18 PROCEDURE — 99213 OFFICE O/P EST LOW 20 MIN: CPT | Performed by: FAMILY MEDICINE

## 2020-04-18 NOTE — PROGRESS NOTES
Virtual Telephone Check-In    Jonah Martin verbally consents to a Virtual/Telephone Check-In visit on 04/18/20.     Patient understands and accepts financial responsibility for any deductible, co-insurance and/or co-pays associated with this serv

## 2020-07-13 ENCOUNTER — OFFICE VISIT (OUTPATIENT)
Dept: FAMILY MEDICINE CLINIC | Facility: CLINIC | Age: 55
End: 2020-07-13

## 2020-07-13 VITALS
RESPIRATION RATE: 18 BRPM | SYSTOLIC BLOOD PRESSURE: 140 MMHG | HEART RATE: 79 BPM | TEMPERATURE: 98 F | DIASTOLIC BLOOD PRESSURE: 89 MMHG | BODY MASS INDEX: 48.82 KG/M2 | HEIGHT: 65 IN | WEIGHT: 293 LBS

## 2020-07-13 DIAGNOSIS — I10 ESSENTIAL HYPERTENSION: ICD-10-CM

## 2020-07-13 DIAGNOSIS — H18.603 KERATOCONUS OF BOTH EYES: Primary | ICD-10-CM

## 2020-07-13 DIAGNOSIS — J21.9 ACUTE BRONCHIOLITIS DUE TO UNSPECIFIED ORGANISM: ICD-10-CM

## 2020-07-13 DIAGNOSIS — J45.20 MILD INTERMITTENT ASTHMA WITHOUT COMPLICATION: ICD-10-CM

## 2020-07-13 PROCEDURE — 99214 OFFICE O/P EST MOD 30 MIN: CPT | Performed by: FAMILY MEDICINE

## 2020-07-13 RX ORDER — LOSARTAN POTASSIUM 50 MG/1
50 TABLET ORAL DAILY
Qty: 90 TABLET | Refills: 1 | Status: SHIPPED | OUTPATIENT
Start: 2020-07-13 | End: 2020-10-24

## 2020-07-13 RX ORDER — AZITHROMYCIN 250 MG/1
TABLET, FILM COATED ORAL
Qty: 6 TABLET | Refills: 0 | Status: SHIPPED | OUTPATIENT
Start: 2020-07-13 | End: 2020-07-18

## 2020-07-13 RX ORDER — HYDROCHLOROTHIAZIDE 12.5 MG/1
12.5 TABLET ORAL DAILY
Qty: 90 TABLET | Refills: 1 | Status: SHIPPED | OUTPATIENT
Start: 2020-07-13 | End: 2020-10-24

## 2020-07-13 RX ORDER — PROMETHAZINE HYDROCHLORIDE 25 MG/1
TABLET ORAL
Qty: 15 ML | Refills: 6 | Status: SHIPPED | OUTPATIENT
Start: 2020-07-13 | End: 2021-01-19

## 2020-07-14 NOTE — PROGRESS NOTES
HPI:    Patient ID: Ying Deleon is a 48year old female.     This patient is a 46-year-old -American female who is well-established at our clinic who is treated for hypertension with variable measures but usually consistently to goal when ear canal normal.   Left Ear: Tympanic membrane and ear canal normal.   Nose: Mucosal edema present. Mouth/Throat: Oropharynx is clear and moist.   Cardiovascular: Normal rate, regular rhythm and normal heart sounds. Exam reveals no gallop.     Pulmonary EXTERNAL  Patient Instructions   Clear fluid hydration. Rest. Take all medications as prescribed. If symptoms persist or worsen please call or return to clinic ASAP. Medication reviewed and renewed where needed and appropriate. Comply with medications.

## 2020-07-22 ENCOUNTER — TELEPHONE (OUTPATIENT)
Dept: CASE MANAGEMENT | Age: 55
End: 2020-07-22

## 2020-07-22 NOTE — TELEPHONE ENCOUNTER
[address] pt was seen by Dr. Celio Heredia who referred her to Dr. Ho Agarwal notes have been fax to Managed care.

## 2020-07-22 NOTE — TELEPHONE ENCOUNTER
Dr. Abi Rosenberg,    I am working on the referral you submitted for [facility] to see Dr. Nithin Choudhary.    Dr. Duong Veliz is not in the patient's network. If she needs tertiary care she may see a physician at [address] [place] [address] or [facility].

## 2020-07-22 NOTE — TELEPHONE ENCOUNTER
I called [facility]. Was told that there is no specific provider but patient should be referred to the [facility] which is tier 1. [facility] would be tier 2 and slightly more expensive.

## 2020-07-22 NOTE — TELEPHONE ENCOUNTER
Ernesto Camargo,    Yes, if you can please send them again and put attention Linda.     Thank you  Juanita Huerta

## 2020-07-22 NOTE — TELEPHONE ENCOUNTER
Hello,    I have not received any OV notes from Dr. Santiago Childress. Were they just sent?     Lj Velasquez

## 2020-07-22 NOTE — TELEPHONE ENCOUNTER
Abbey Richardson is gone for the day but I did refax the notes just now to [phone] to your attention. Should go through in a few minutes.

## 2020-09-23 ENCOUNTER — TELEPHONE (OUTPATIENT)
Dept: CARDIOLOGY CLINIC | Facility: CLINIC | Age: 55
End: 2020-09-23

## 2020-09-23 NOTE — TELEPHONE ENCOUNTER
Chart reviewed, per Dr. Kendal Pozo recommendations, she should follow up with [facility] (around April 2020) then with general cardiologist annually    Sees Dr. Eren Charles for sleep apnea and doing well.  Weight loss was good at first, now working from home and struggling

## 2020-10-24 ENCOUNTER — OFFICE VISIT (OUTPATIENT)
Dept: FAMILY MEDICINE CLINIC | Facility: CLINIC | Age: 55
End: 2020-10-24

## 2020-10-24 VITALS
DIASTOLIC BLOOD PRESSURE: 81 MMHG | TEMPERATURE: 97 F | HEART RATE: 78 BPM | SYSTOLIC BLOOD PRESSURE: 134 MMHG | HEIGHT: 65 IN | WEIGHT: 293 LBS | BODY MASS INDEX: 48.82 KG/M2

## 2020-10-24 DIAGNOSIS — I10 ESSENTIAL HYPERTENSION: Primary | ICD-10-CM

## 2020-10-24 DIAGNOSIS — J01.90 ACUTE RHINOSINUSITIS: ICD-10-CM

## 2020-10-24 DIAGNOSIS — E66.01 MORBID OBESITY WITH BMI OF 60.0-69.9, ADULT (HCC): ICD-10-CM

## 2020-10-24 DIAGNOSIS — J45.20 MILD INTERMITTENT ASTHMA WITHOUT COMPLICATION: ICD-10-CM

## 2020-10-24 DIAGNOSIS — H18.603 KERATOCONUS OF BOTH EYES: ICD-10-CM

## 2020-10-24 PROCEDURE — 3008F BODY MASS INDEX DOCD: CPT | Performed by: FAMILY MEDICINE

## 2020-10-24 PROCEDURE — 99214 OFFICE O/P EST MOD 30 MIN: CPT | Performed by: FAMILY MEDICINE

## 2020-10-24 PROCEDURE — 3075F SYST BP GE 130 - 139MM HG: CPT | Performed by: FAMILY MEDICINE

## 2020-10-24 PROCEDURE — 3079F DIAST BP 80-89 MM HG: CPT | Performed by: FAMILY MEDICINE

## 2020-10-24 RX ORDER — LOSARTAN POTASSIUM 50 MG/1
50 TABLET ORAL DAILY
Qty: 90 TABLET | Refills: 1 | Status: SHIPPED | OUTPATIENT
Start: 2020-10-24 | End: 2021-08-02

## 2020-10-24 RX ORDER — AZITHROMYCIN 250 MG/1
TABLET, FILM COATED ORAL
Qty: 6 TABLET | Refills: 0 | Status: SHIPPED | OUTPATIENT
Start: 2020-10-24 | End: 2020-10-29

## 2020-10-24 RX ORDER — FEXOFENADINE HCL AND PSEUDOEPHEDRINE HCI 60; 120 MG/1; MG/1
1 TABLET, EXTENDED RELEASE ORAL 2 TIMES DAILY
Qty: 30 TABLET | Refills: 0 | Status: SHIPPED | OUTPATIENT
Start: 2020-10-24

## 2020-10-24 RX ORDER — HYDROCHLOROTHIAZIDE 12.5 MG/1
12.5 TABLET ORAL DAILY
Qty: 90 TABLET | Refills: 1 | Status: SHIPPED | OUTPATIENT
Start: 2020-10-24 | End: 2021-08-02

## 2020-10-24 NOTE — PROGRESS NOTES
HPI:    Patient ID: Dee Dee Last is a 48year old female.     This patient is a 62-year-old -American female who is well-established at our clinic who was treated for hypertension, mild intermittent asthma which is currently asymptomatic a thyromegaly present. Cardiovascular: Normal rate and regular rhythm. Exam reveals no gallop. Carotid bruit not present. Pulmonary/Chest: Effort normal and breath sounds normal.   Neurological: She is alert and oriented to person, place, and time. Referrals:  None  Patient Instructions   Monitor blood pressures and record at home. Limit salt intake. Recommend weight loss via daily exercising and consistent healthy dietary changes. Encouraged physical fitness and daily physical activity daily.   Christine Medina

## 2020-10-24 NOTE — PATIENT INSTRUCTIONS
Monitor blood pressures and record at home. Limit salt intake. Recommend weight loss via daily exercising and consistent healthy dietary changes. Encouraged physical fitness and daily physical activity daily. Keep ophthalmology appointments.    Medicatio

## 2020-12-28 ENCOUNTER — TELEPHONE (OUTPATIENT)
Dept: FAMILY MEDICINE CLINIC | Facility: CLINIC | Age: 55
End: 2020-12-28

## 2020-12-28 DIAGNOSIS — Z12.31 BREAST CANCER SCREENING BY MAMMOGRAM: Primary | ICD-10-CM

## 2020-12-28 NOTE — TELEPHONE ENCOUNTER
Dr. Dudley Tripp, patient is requesting a mammogram order. Last mammogram was completed 12/07/2019. Please advise on order. CONCLUSION:       BI-RADS CATEGORY:     DIAGNOSTIC CATEGORY 2--BENIGN FINDING NO CHANGE FROM COMPARISON ASSESSMENT. RECOMMENDATIONS:    ROUTINE MAMMOGRAM AND CLINICAL EVALUATION IN 12 MONTHS.

## 2021-01-18 DIAGNOSIS — J45.20 MILD INTERMITTENT ASTHMA WITHOUT COMPLICATION: ICD-10-CM

## 2021-01-19 RX ORDER — PROMETHAZINE HYDROCHLORIDE 25 MG/1
TABLET ORAL
Qty: 300 ML | Refills: 0 | Status: SHIPPED | OUTPATIENT
Start: 2021-01-19

## 2021-02-19 ENCOUNTER — HOSPITAL ENCOUNTER (OUTPATIENT)
Dept: MAMMOGRAPHY | Facility: HOSPITAL | Age: 56
Discharge: HOME | End: 2021-02-19
Attending: FAMILY MEDICINE
Payer: COMMERCIAL

## 2021-02-19 ENCOUNTER — OFFICE VISIT (OUTPATIENT)
Dept: FAMILY MEDICINE CLINIC | Facility: CLINIC | Age: 56
End: 2021-02-19
Payer: COMMERCIAL

## 2021-02-19 VITALS
DIASTOLIC BLOOD PRESSURE: 80 MMHG | HEIGHT: 65 IN | SYSTOLIC BLOOD PRESSURE: 120 MMHG | TEMPERATURE: 96 F | HEART RATE: 69 BPM | BODY MASS INDEX: 48.82 KG/M2 | RESPIRATION RATE: 19 BRPM | WEIGHT: 293 LBS

## 2021-02-19 DIAGNOSIS — R09.81 NASAL SINUS CONGESTION: ICD-10-CM

## 2021-02-19 DIAGNOSIS — Z12.31 BREAST CANCER SCREENING BY MAMMOGRAM: ICD-10-CM

## 2021-02-19 DIAGNOSIS — I10 ESSENTIAL HYPERTENSION: Primary | ICD-10-CM

## 2021-02-19 DIAGNOSIS — E66.01 MORBID OBESITY WITH BMI OF 50.0-59.9, ADULT (HCC): ICD-10-CM

## 2021-02-19 DIAGNOSIS — G47.33 OBSTRUCTIVE SLEEP APNEA SYNDROME: ICD-10-CM

## 2021-02-19 PROCEDURE — 77063 BREAST TOMOSYNTHESIS BI: CPT | Performed by: FAMILY MEDICINE

## 2021-02-19 PROCEDURE — 99214 OFFICE O/P EST MOD 30 MIN: CPT | Performed by: FAMILY MEDICINE

## 2021-02-19 PROCEDURE — 3008F BODY MASS INDEX DOCD: CPT | Performed by: FAMILY MEDICINE

## 2021-02-19 PROCEDURE — 77067 SCR MAMMO BI INCL CAD: CPT | Performed by: FAMILY MEDICINE

## 2021-02-19 PROCEDURE — 3074F SYST BP LT 130 MM HG: CPT | Performed by: FAMILY MEDICINE

## 2021-02-19 PROCEDURE — 3079F DIAST BP 80-89 MM HG: CPT | Performed by: FAMILY MEDICINE

## 2021-02-19 NOTE — PROGRESS NOTES
HPI:    Patient ID: Charles De Jesus is a 48year old female.     This patient is a 50-year-old -American female well-established at our clinic treated for hypertension and also obstructive sleep apnea and has keratoconjunctivitis and is under and no frontal sinus tenderness. Cardiovascular: Normal rate and regular rhythm. Exam reveals no gallop. Pulmonary/Chest: Effort normal and breath sounds normal.   Neurological: She is alert and oriented to person, place, and time.  No cranial nerve d

## 2021-02-19 NOTE — PATIENT INSTRUCTIONS
Medication reviewed and renewed where needed and appropriate. Monitor blood pressures and record at home. Limit salt intake. Recommend weight loss via daily exercising and consistent healthy dietary changes.   Encouraged physical fitness and daily physica

## 2021-03-10 ENCOUNTER — TELEPHONE (OUTPATIENT)
Dept: FAMILY MEDICINE CLINIC | Facility: CLINIC | Age: 56
End: 2021-03-10

## 2021-03-10 DIAGNOSIS — Z23 NEED FOR ZOSTER VACCINATION: Primary | ICD-10-CM

## 2021-04-01 ENCOUNTER — NURSE ONLY (OUTPATIENT)
Dept: FAMILY MEDICINE CLINIC | Facility: CLINIC | Age: 56
End: 2021-04-01
Payer: COMMERCIAL

## 2021-04-01 PROCEDURE — 90750 HZV VACC RECOMBINANT IM: CPT | Performed by: FAMILY MEDICINE

## 2021-04-01 PROCEDURE — 90471 IMMUNIZATION ADMIN: CPT | Performed by: FAMILY MEDICINE

## 2021-04-29 ENCOUNTER — TELEPHONE (OUTPATIENT)
Dept: OBGYN CLINIC | Facility: CLINIC | Age: 56
End: 2021-04-29

## 2021-04-29 NOTE — TELEPHONE ENCOUNTER
Pt has mirena IUD so please reassure her that irreg spotting is common and no cause for concern at this time as long as bleeding is not heavy

## 2021-04-29 NOTE — TELEPHONE ENCOUNTER
Pt states that she has a pink to brown spotting that happened on 4/23/21 after a BM. Denies IC. Pt had IUD removal and insertion on 9/4/19. Pt states that period stopped six months after the insertion 9/4/19, until 4/23 with the spotting happened.      D

## 2021-06-04 ENCOUNTER — NURSE ONLY (OUTPATIENT)
Dept: FAMILY MEDICINE CLINIC | Facility: CLINIC | Age: 56
End: 2021-06-04
Payer: COMMERCIAL

## 2021-06-04 DIAGNOSIS — Z23 NEED FOR ZOSTER VACCINATION: Primary | ICD-10-CM

## 2021-06-04 DIAGNOSIS — Z23 NEED FOR PNEUMOCOCCAL VACCINATION: ICD-10-CM

## 2021-06-04 PROCEDURE — 90750 HZV VACC RECOMBINANT IM: CPT | Performed by: FAMILY MEDICINE

## 2021-06-04 PROCEDURE — 90471 IMMUNIZATION ADMIN: CPT | Performed by: FAMILY MEDICINE

## 2021-06-04 PROCEDURE — 90732 PPSV23 VACC 2 YRS+ SUBQ/IM: CPT | Performed by: FAMILY MEDICINE

## 2021-06-04 PROCEDURE — 90472 IMMUNIZATION ADMIN EACH ADD: CPT | Performed by: FAMILY MEDICINE

## 2021-07-28 ENCOUNTER — MED REC SCAN ONLY (OUTPATIENT)
Dept: OPHTHALMOLOGY | Facility: CLINIC | Age: 56
End: 2021-07-28

## 2021-08-02 DIAGNOSIS — I10 ESSENTIAL HYPERTENSION: ICD-10-CM

## 2021-08-02 RX ORDER — LOSARTAN POTASSIUM 50 MG/1
TABLET ORAL
Qty: 90 TABLET | Refills: 1 | Status: SHIPPED | OUTPATIENT
Start: 2021-08-02 | End: 2021-11-23

## 2021-08-02 RX ORDER — HYDROCHLOROTHIAZIDE 12.5 MG/1
TABLET ORAL
Qty: 90 TABLET | Refills: 1 | Status: SHIPPED | OUTPATIENT
Start: 2021-08-02 | End: 2021-11-23

## 2021-10-21 ENCOUNTER — MED REC SCAN ONLY (OUTPATIENT)
Dept: FAMILY MEDICINE CLINIC | Facility: CLINIC | Age: 56
End: 2021-10-21

## 2021-11-01 ENCOUNTER — OFFICE VISIT (OUTPATIENT)
Dept: FAMILY MEDICINE CLINIC | Facility: CLINIC | Age: 56
End: 2021-11-01
Payer: COMMERCIAL

## 2021-11-01 VITALS
WEIGHT: 293 LBS | DIASTOLIC BLOOD PRESSURE: 77 MMHG | HEART RATE: 65 BPM | BODY MASS INDEX: 48.82 KG/M2 | SYSTOLIC BLOOD PRESSURE: 124 MMHG | HEIGHT: 65 IN

## 2021-11-01 DIAGNOSIS — G47.33 OBSTRUCTIVE SLEEP APNEA SYNDROME: ICD-10-CM

## 2021-11-01 DIAGNOSIS — E66.01 MORBID OBESITY WITH BMI OF 60.0-69.9, ADULT (HCC): ICD-10-CM

## 2021-11-01 DIAGNOSIS — H18.603 KERATOCONUS OF BOTH EYES: ICD-10-CM

## 2021-11-01 DIAGNOSIS — I10 PRIMARY HYPERTENSION: Primary | ICD-10-CM

## 2021-11-01 PROCEDURE — 3078F DIAST BP <80 MM HG: CPT | Performed by: FAMILY MEDICINE

## 2021-11-01 PROCEDURE — 99214 OFFICE O/P EST MOD 30 MIN: CPT | Performed by: FAMILY MEDICINE

## 2021-11-01 PROCEDURE — 3008F BODY MASS INDEX DOCD: CPT | Performed by: FAMILY MEDICINE

## 2021-11-01 PROCEDURE — 3074F SYST BP LT 130 MM HG: CPT | Performed by: FAMILY MEDICINE

## 2021-11-01 NOTE — PROGRESS NOTES
Subjective:   Patient ID: Niyah Jay is a 55year old female.     This patient is a delightful 78-year-old -American female who is well-established at our clinic who was diagnosed with keratoconus bilaterally and has been seeing a myriad sounds. Neurological:      General: No focal deficit present. Mental Status: She is oriented to person, place, and time. Assessment & Plan:   1. Primary hypertension  To goal when measured manually and portably.     2. Obstructive sleep apnea

## 2021-11-16 ENCOUNTER — TELEPHONE (OUTPATIENT)
Dept: GASTROENTEROLOGY | Facility: CLINIC | Age: 56
End: 2021-11-16

## 2021-11-16 NOTE — TELEPHONE ENCOUNTER
----- Message from Kaylah Negron, [address] [place] [address] Ale sent at 1/16/2017  8:26 AM CST -----  Regarding: Recall colon  Recall colon in 5 years per PL. Colon done 1-13-17.

## 2021-11-17 ENCOUNTER — NURSE TRIAGE (OUTPATIENT)
Dept: FAMILY MEDICINE CLINIC | Facility: CLINIC | Age: 56
End: 2021-11-17

## 2021-11-17 NOTE — TELEPHONE ENCOUNTER
Please reply to pool: EM RN TRIAGE    Action Requested: Summary for Provider     []  Critical Lab, Recommendations Needed  [] Need Additional Advice  []   FYI    []   Need Orders  [] Need Medications Sent to Pharmacy  []  Other     SUMMARY: Only wa

## 2021-11-19 ENCOUNTER — OFFICE VISIT (OUTPATIENT)
Dept: FAMILY MEDICINE CLINIC | Facility: CLINIC | Age: 56
End: 2021-11-19
Payer: COMMERCIAL

## 2021-11-19 ENCOUNTER — IMMUNIZATION (OUTPATIENT)
Dept: LAB | Facility: HOSPITAL | Age: 56
End: 2021-11-19
Attending: EMERGENCY MEDICINE
Payer: COMMERCIAL

## 2021-11-19 DIAGNOSIS — H61.22 IMPACTED CERUMEN OF LEFT EAR: Primary | ICD-10-CM

## 2021-11-19 DIAGNOSIS — Z23 NEED FOR VACCINATION: Primary | ICD-10-CM

## 2021-11-19 PROCEDURE — 69209 REMOVE IMPACTED EAR WAX UNI: CPT | Performed by: NURSE PRACTITIONER

## 2021-11-19 PROCEDURE — 0064A SARSCOV2 VAC 50MCG/0.25ML IM: CPT

## 2021-11-19 NOTE — PATIENT INSTRUCTIONS
May use Debrox or 1/2 strength hydrogen peroxide (50:50 with water) twice a day for a week, then every few months to prevent ear wax buildup.   (Do not do this if you may have ear infection- instead seek medical attention)    Seek medical attention for ear example, putting cotton swabs in the ear may push the wax deeper into the ear. Over time, this may cause blockage. Hearing aids, swimming plugs, and swim molds can also cause this problem when used again and again.    In some cases, the cause of impacted ea earwax  · Severe symptoms after earwax removal, such as bleeding or severe ear pain    Manuel last reviewed this educational content on 9/1/2019  © 4682-0117 [employer]. All rights reserved.  This information is not intended as a substitute

## 2021-11-19 NOTE — PROGRESS NOTES
CHIEF COMPLAINT:   Patient presents with:  Ear Problem: clogged ear        HPI:   Dunia Proctor is a 55year old female who presents for ear being clogged. No home treatments tried. + hearing loss and diminished hearing.   No hx of perforate earwax. Earwax helps protect your ear canal from water, dirt, infection, and injury. Over time, earwax travels from the inner part of your ear canal to the entrance of the canal. Then it falls away naturally.  But in some cases, it can’t travel to the [facility] leave the ear over time. · Rinsing the ear canal with water. This is done in a healthcare provider’s office. · Removing the earwax with small tools. This is also done in a provider’s office.   In rare cases, some treatments for earwax removal may cause co

## 2021-11-21 DIAGNOSIS — I10 ESSENTIAL HYPERTENSION: ICD-10-CM

## 2021-11-23 ENCOUNTER — TELEPHONE (OUTPATIENT)
Dept: GASTROENTEROLOGY | Facility: CLINIC | Age: 56
End: 2021-11-23

## 2021-11-23 DIAGNOSIS — Z86.010 HX OF COLONIC POLYPS: Primary | ICD-10-CM

## 2021-11-23 RX ORDER — HYDROCHLOROTHIAZIDE 12.5 MG/1
TABLET ORAL
Qty: 90 TABLET | Refills: 1 | Status: SHIPPED | OUTPATIENT
Start: 2021-11-23

## 2021-11-23 RX ORDER — LOSARTAN POTASSIUM 50 MG/1
TABLET ORAL
Qty: 90 TABLET | Refills: 1 | Status: SHIPPED | OUTPATIENT
Start: 2021-11-23

## 2021-11-23 RX ORDER — POLYETHYLENE GLYCOL 3350, SODIUM CHLORIDE, SODIUM BICARBONATE, POTASSIUM CHLORIDE 420; 11.2; 5.72; 1.48 G/4L; G/4L; G/4L; G/4L
POWDER, FOR SOLUTION ORAL
Qty: 4000 ML | Refills: 0 | Status: SHIPPED | OUTPATIENT
Start: 2021-11-23

## 2021-11-23 NOTE — TELEPHONE ENCOUNTER
The patient's chart has been reviewed. Okay to schedule pt for 5 year CLN recall r/t hx colon polyps with Dr. Toshia Das.      Advise MAC sedation with split dose Colyte/TriLyte or equivalent(eRx) preparation.   -Eligible for NE: No r/t hx RUPAL  -Denies history o

## 2021-11-23 NOTE — TELEPHONE ENCOUNTER
Patient calling to schedule a recall colonoscopy.     Last Procedure:  Colonoscopy 01/132017  Last Diagnosis:  colon polyp x 1  - diverticulosis  - internal hemorrhoids  Recalled for (months or years): 5 years  Sedation used previously:  MAC  Last Prep Used tolerated without immediate complication.        Findings:  The preparation of the colon was good. The ileocecal valve was well preserved. The visualized colonic mucosa from the cecum to the anal verge was normal with an intact vascular pattern.   Colon po

## 2021-11-24 NOTE — TELEPHONE ENCOUNTER
I contacted patient and scheduled her colonoscopy    Scheduled for: Colonoscopy 34739  Provider Name: Dr Avtar Corado    Date: Rubi Ming 1/18/2022  Location: [facility]    Sedation: MAC    Time: 10:30 am   Prep: split dose Colyte/TriLyte    Meds/Allergies Reconciled?:  CHET

## 2021-11-27 ENCOUNTER — APPOINTMENT (OUTPATIENT)
Dept: GENERAL RADIOLOGY | Age: 56
End: 2021-11-27
Attending: NURSE PRACTITIONER
Payer: COMMERCIAL

## 2021-11-27 ENCOUNTER — HOSPITAL ENCOUNTER (OUTPATIENT)
Age: 56
Discharge: HOME | End: 2021-11-27
Payer: COMMERCIAL

## 2021-11-27 VITALS
DIASTOLIC BLOOD PRESSURE: 74 MMHG | OXYGEN SATURATION: 100 % | RESPIRATION RATE: 18 BRPM | SYSTOLIC BLOOD PRESSURE: 139 MMHG | HEART RATE: 62 BPM | TEMPERATURE: 99 F
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
## 2021-11-27 DIAGNOSIS — S46.911A ELBOW STRAIN, RIGHT, INITIAL ENCOUNTER: Primary | ICD-10-CM

## 2021-11-27 DIAGNOSIS — S00.219A: ICD-10-CM

## 2021-11-27 PROCEDURE — 99214 OFFICE O/P EST MOD 30 MIN: CPT | Performed by: NURSE PRACTITIONER

## 2021-11-27 PROCEDURE — A4565 SLINGS: HCPCS | Performed by: NURSE PRACTITIONER

## 2021-11-27 PROCEDURE — 73110 X-RAY EXAM OF WRIST: CPT | Performed by: NURSE PRACTITIONER

## 2021-11-27 PROCEDURE — 73030 X-RAY EXAM OF SHOULDER: CPT | Performed by: NURSE PRACTITIONER

## 2021-11-27 PROCEDURE — 73080 X-RAY EXAM OF ELBOW: CPT | Performed by: NURSE PRACTITIONER

## 2021-11-27 NOTE — ED INITIAL ASSESSMENT (HPI)
Pt here with complaints of right arm pain,pt states she tripped and fell down 4 concrete steps, pt has pain to her left shoulder , elbow and wrist, pt states she is not able to bear wt on her right arm, pt has a small abrasion to her right upper eye

## 2021-11-27 NOTE — ED PROVIDER NOTES
Patient Seen in: [facility]    History   CC: arm pain  HPI: Laureano White 55year old female  who presents c/o right arm pain which is mainly from her elbow and her wrist status post injury just prior to arrival in which patient stat ENDOSCOPY   • D & C     • D & C  2013   • EYE SURGERY Right 01/2021    CXL with Dr. Piedad Doty    • YAG CAPSULOTOMY - OD - RIGHT EYE Right 5/17/17    [facility]   • YAG CAPSULOTOMY - OS - LEFT EYE Left 5/31/17    [facility]       Family History   Problem Relation Age 139/74   Pulse 62   Resp 18   Temp 98.5 °F (36.9 °C)   Temp src    SpO2 100 %   O2 Device None (Room air)       Current:/74   Pulse 62   Temp 98.5 °F (36.9 °C)   Resp 18   SpO2 100%         PE:  General - Appears well, non-toxic and in NAD  Head - + navicular area, hand or finger tenderness of the right upper extremity. No left upper extremity or bilateral lower extremity diffuse.   Psych - Interactive and appropriate      ED Course   Labs Reviewed - No data to display    MDM     PROCEDURE: XR WRIST C fracture or dislocation.  Mild degenerative change. SOFT TISSUES: Negative. No visible soft tissue swelling. EFFUSION: None visible.    OTHER: Negative.                   Impression   CONCLUSION:       No acute fracture or dislocation.       Mild osteoa

## 2021-12-16 ENCOUNTER — TELEPHONE (OUTPATIENT)
Dept: GASTROENTEROLOGY | Facility: CLINIC | Age: 56
End: 2021-12-16

## 2021-12-16 DIAGNOSIS — Z86.010 PERSONAL HISTORY OF COLONIC POLYPS: Primary | ICD-10-CM

## 2021-12-16 NOTE — TELEPHONE ENCOUNTER
Rescheduled for:  Colonoscopy 90600  Provider Name:  Dr. Toshia Das  Date:    From-1/18/22 To-4/8/22  [facility]  Sedation:  MAC  Time:    From-1030 To-1230 (pt is aware to arrive at 1130)   Prep:  Trilyte, sent via [address] [place] [address] [address] on 12/16/21  [facility]

## 2022-01-18 NOTE — TELEPHONE ENCOUNTER
I spoke with this patient to see if she had her new insurance card so I can enter a new referral in the system but she did not have it with her so she stated she will CB on Friday to give me that information.

## 2022-01-24 NOTE — TELEPHONE ENCOUNTER
CBLM to receive patients new insurance for the upcoming procedure.  Please transfer to [facility] in GI

## 2022-01-27 NOTE — TELEPHONE ENCOUNTER
CBLM to change her insurance to [facility] for her procedure.  Please transfer to [facility] in GI      I informed her that I have been trying to receive this information several times to make sure her procedure is covered since an HMO need to be filed before the

## 2022-02-03 ENCOUNTER — TELEPHONE (OUTPATIENT)
Dept: GASTROENTEROLOGY | Facility: CLINIC | Age: 57
End: 2022-02-03

## 2022-02-03 NOTE — TELEPHONE ENCOUNTER
Patient calling to reschedule her cln on 4/8, patient requesting to reschedule in June. Patient informed of the 5 business day turnaround. Please call at [phone],Open[employer].

## 2022-03-05 ENCOUNTER — HOSPITAL ENCOUNTER (OUTPATIENT)
Dept: MAMMOGRAPHY | Facility: HOSPITAL | Age: 57
Discharge: HOME | End: 2022-03-05
Attending: FAMILY MEDICINE
Payer: COMMERCIAL

## 2022-03-05 DIAGNOSIS — Z12.31 ENCOUNTER FOR SCREENING MAMMOGRAM FOR MALIGNANT NEOPLASM OF BREAST: ICD-10-CM

## 2022-03-05 PROCEDURE — 77067 SCR MAMMO BI INCL CAD: CPT | Performed by: FAMILY MEDICINE

## 2022-03-05 PROCEDURE — 77063 BREAST TOMOSYNTHESIS BI: CPT | Performed by: FAMILY MEDICINE

## 2022-03-08 ENCOUNTER — HOSPITAL ENCOUNTER (OUTPATIENT)
Age: 57
Discharge: HOME | End: 2022-03-08
Payer: COMMERCIAL

## 2022-03-08 ENCOUNTER — NURSE TRIAGE (OUTPATIENT)
Dept: FAMILY MEDICINE CLINIC | Facility: CLINIC | Age: 57
End: 2022-03-08

## 2022-03-08 VITALS
SYSTOLIC BLOOD PRESSURE: 140 MMHG | DIASTOLIC BLOOD PRESSURE: 61 MMHG | HEART RATE: 64 BPM | RESPIRATION RATE: 19 BRPM | TEMPERATURE: 98 F | OXYGEN SATURATION: 100 %

## 2022-03-08 DIAGNOSIS — N30.00 ACUTE CYSTITIS WITHOUT HEMATURIA: Primary | ICD-10-CM

## 2022-03-08 LAB
BILIRUB UR QL STRIP: NEGATIVE
CLARITY UR: CLEAR
COLOR UR: YELLOW
GLUCOSE UR STRIP-MCNC: NEGATIVE MG/DL
HGB UR QL STRIP: NEGATIVE
KETONES UR STRIP-MCNC: NEGATIVE MG/DL
NITRITE UR QL STRIP: NEGATIVE
PH UR STRIP: 5.5 [PH]
PROT UR STRIP-MCNC: 30 MG/DL
SP GR UR STRIP: 1.02
UROBILINOGEN UR STRIP-ACNC: <2 MG/DL

## 2022-03-08 PROCEDURE — 81002 URINALYSIS NONAUTO W/O SCOPE: CPT | Performed by: NURSE PRACTITIONER

## 2022-03-08 PROCEDURE — 99213 OFFICE O/P EST LOW 20 MIN: CPT | Performed by: NURSE PRACTITIONER

## 2022-03-08 RX ORDER — NITROFURANTOIN 25; 75 MG/1; MG/1
100 CAPSULE ORAL 2 TIMES DAILY
Qty: 14 CAPSULE | Refills: 0 | Status: SHIPPED | OUTPATIENT
Start: 2022-03-08 | End: 2022-03-15

## 2022-03-08 NOTE — ED INITIAL ASSESSMENT (HPI)
Pt here with concerns of a possible UTI, pt states she has been having urinary urgency for 1 day, pt denies any pain with urination, abd pain or fever

## 2022-04-19 ENCOUNTER — OFFICE VISIT (OUTPATIENT)
Dept: FAMILY MEDICINE CLINIC | Facility: CLINIC | Age: 57
End: 2022-04-19
Payer: COMMERCIAL

## 2022-04-19 ENCOUNTER — LAB ENCOUNTER (OUTPATIENT)
Dept: LAB | Age: 57
End: 2022-04-19
Attending: FAMILY MEDICINE
Payer: COMMERCIAL

## 2022-04-19 VITALS
SYSTOLIC BLOOD PRESSURE: 126 MMHG | WEIGHT: 293 LBS | HEIGHT: 65 IN | BODY MASS INDEX: 48.82 KG/M2 | HEART RATE: 62 BPM | DIASTOLIC BLOOD PRESSURE: 80 MMHG | TEMPERATURE: 98 F

## 2022-04-19 DIAGNOSIS — G47.33 OBSTRUCTIVE SLEEP APNEA SYNDROME: ICD-10-CM

## 2022-04-19 DIAGNOSIS — I10 PRIMARY HYPERTENSION: ICD-10-CM

## 2022-04-19 DIAGNOSIS — J45.20 MILD INTERMITTENT ASTHMA WITHOUT COMPLICATION: ICD-10-CM

## 2022-04-19 DIAGNOSIS — E66.01 MORBID OBESITY WITH BMI OF 60.0-69.9, ADULT (HCC): ICD-10-CM
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
## 2022-04-19 DIAGNOSIS — R39.9 UTI SYMPTOMS: ICD-10-CM

## 2022-04-19 DIAGNOSIS — H18.603 KERATOCONUS OF BOTH EYES: Primary | ICD-10-CM

## 2022-04-19 LAB
ALBUMIN SERPL-MCNC: 3.7 G/DL (ref 3.4–5)
ALBUMIN/GLOB SERPL: 1 {RATIO} (ref 1–2)
ALP LIVER SERPL-CCNC: 105 U/L
ALT SERPL-CCNC: 19 U/L
ANION GAP SERPL CALC-SCNC: 7 MMOL/L (ref 0–18)
AST SERPL-CCNC: 17 U/L (ref 15–37)
BASOPHILS # BLD AUTO: 0.07 X10(3) UL (ref 0–0.2)
BASOPHILS NFR BLD AUTO: 0.7 %
BILIRUB SERPL-MCNC: 0.4 MG/DL (ref 0.1–2)
BILIRUB UR QL: NEGATIVE
BUN BLD-MCNC: 24 MG/DL (ref 7–18)
BUN/CREAT SERPL: 30.8 (ref 10–20)
CALCIUM BLD-MCNC: 8.4 MG/DL (ref 8.5–10.1)
CHLORIDE SERPL-SCNC: 105 MMOL/L (ref 98–112)
CHOLEST SERPL-MCNC: 173 MG/DL (ref ?–200)
CO2 SERPL-SCNC: 28 MMOL/L (ref 21–32)
COLOR UR: YELLOW
CREAT BLD-MCNC: 0.78 MG/DL
DEPRECATED RDW RBC AUTO: 51.2 FL (ref 35.1–46.3)
EOSINOPHIL # BLD AUTO: 0.17 X10(3) UL (ref 0–0.7)
EOSINOPHIL NFR BLD AUTO: 1.6 %
ERYTHROCYTE [DISTWIDTH] IN BLOOD BY AUTOMATED COUNT: 15.4 % (ref 11–15)
FASTING PATIENT LIPID ANSWER: YES
FASTING STATUS PATIENT QL REPORTED: YES
GLOBULIN PLAS-MCNC: 3.6 G/DL (ref 2.8–4.4)
GLUCOSE BLD-MCNC: 81 MG/DL (ref 70–99)
GLUCOSE UR-MCNC: NEGATIVE MG/DL
HCT VFR BLD AUTO: 40 %
HDLC SERPL-MCNC: 66 MG/DL (ref 40–59)
HGB BLD-MCNC: 12.5 G/DL
IMM GRANULOCYTES # BLD AUTO: 0.03 X10(3) UL (ref 0–1)
IMM GRANULOCYTES NFR BLD: 0.3 %
KETONES UR-MCNC: NEGATIVE MG/DL
LDLC SERPL CALC-MCNC: 94 MG/DL (ref ?–100)
LYMPHOCYTES # BLD AUTO: 3.64 X10(3) UL (ref 1–4)
LYMPHOCYTES NFR BLD AUTO: 35 %
MCH RBC QN AUTO: 28.1 PG (ref 26–34)
MCHC RBC AUTO-ENTMCNC: 31.3 G/DL (ref 31–37)
MCV RBC AUTO: 89.9 FL
MONOCYTES # BLD AUTO: 0.71 X10(3) UL (ref 0.1–1)
MONOCYTES NFR BLD AUTO: 6.8 %
NEUTROPHILS # BLD AUTO: 5.78 X10 (3) UL (ref 1.5–7.7)
NEUTROPHILS # BLD AUTO: 5.78 X10(3) UL (ref 1.5–7.7)
NEUTROPHILS NFR BLD AUTO: 55.6 %
NITRITE UR QL STRIP.AUTO: NEGATIVE
NONHDLC SERPL-MCNC: 107 MG/DL (ref ?–130)
OSMOLALITY SERPL CALC.SUM OF ELEC: 293 MOSM/KG (ref 275–295)
PH UR: 5 [PH] (ref 5–8)
PLATELET # BLD AUTO: 276 10(3)UL (ref 150–450)
POTASSIUM SERPL-SCNC: 3.6 MMOL/L (ref 3.5–5.1)
PROT SERPL-MCNC: 7.3 G/DL (ref 6.4–8.2)
PROT UR-MCNC: 100 MG/DL
RBC # BLD AUTO: 4.45 X10(6)UL
SODIUM SERPL-SCNC: 140 MMOL/L (ref 136–145)
SP GR UR STRIP: 1.02 (ref 1–1.03)
TRIGL SERPL-MCNC: 67 MG/DL (ref 30–149)
TSI SER-ACNC: 1.53 MIU/ML (ref 0.36–3.74)
UROBILINOGEN UR STRIP-ACNC: <2
VIT C UR-MCNC: NEGATIVE MG/DL
VLDLC SERPL CALC-MCNC: 11 MG/DL (ref 0–30)
WBC # BLD AUTO: 10.4 X10(3) UL (ref 4–11)
WBC #/AREA URNS AUTO: >50 /HPF

## 2022-04-19 PROCEDURE — 3074F SYST BP LT 130 MM HG: CPT | Performed by: FAMILY MEDICINE

## 2022-04-19 PROCEDURE — 80061 LIPID PANEL: CPT

## 2022-04-19 PROCEDURE — 36415 COLL VENOUS BLD VENIPUNCTURE: CPT

## 2022-04-19 PROCEDURE — 81001 URINALYSIS AUTO W/SCOPE: CPT

## 2022-04-19 PROCEDURE — 3079F DIAST BP 80-89 MM HG: CPT | Performed by: FAMILY MEDICINE

## 2022-04-19 PROCEDURE — 85025 COMPLETE CBC W/AUTO DIFF WBC: CPT

## 2022-04-19 PROCEDURE — 84443 ASSAY THYROID STIM HORMONE: CPT

## 2022-04-19 PROCEDURE — 99214 OFFICE O/P EST MOD 30 MIN: CPT | Performed by: FAMILY MEDICINE

## 2022-04-19 PROCEDURE — 80053 COMPREHEN METABOLIC PANEL: CPT

## 2022-04-19 PROCEDURE — 3008F BODY MASS INDEX DOCD: CPT | Performed by: FAMILY MEDICINE

## 2022-04-19 RX ORDER — CIPROFLOXACIN 250 MG/1
250 TABLET, FILM COATED ORAL 2 TIMES DAILY
Qty: 14 TABLET | Refills: 0 | Status: SHIPPED | OUTPATIENT
Start: 2022-04-19 | End: 2022-04-26

## 2022-04-19 NOTE — PATIENT INSTRUCTIONS
Recommend weight loss via daily exercising and consistent healthy dietary changes. Encouraged physical fitness and daily physical activity daily. Medication reviewed and renewed where needed and appropriate. Comply with medications.

## 2022-05-04 ENCOUNTER — TELEPHONE (OUTPATIENT)
Dept: FAMILY MEDICINE CLINIC | Facility: CLINIC | Age: 57
End: 2022-05-04

## 2022-05-04 DIAGNOSIS — H18.603 KERATOCONUS OF BOTH EYES: Primary | ICD-10-CM

## 2022-05-04 NOTE — TELEPHONE ENCOUNTER
Patient needs a referral for   Dr. Sindi Dial MD Cornea and External     [facility] [place]  [id number] [facility] [address]     Diagnosis needs to be on it referral:  keratoconus      [facility] is no longer in her network so this is who she was given by her insurance

## 2022-06-20 ENCOUNTER — TELEPHONE (OUTPATIENT)
Dept: GASTROENTEROLOGY | Facility: CLINIC | Age: 57
End: 2022-06-20

## 2022-06-20 NOTE — TELEPHONE ENCOUNTER
Patient contacted. Reviewed how to take split dose prep in detail. Reviewed diet and medication adjustments. Aware no covid test needed for just colonoscopies at this time. All questions answered. [facility]

## 2022-06-20 NOTE — TELEPHONE ENCOUNTER
Pt would like to get CLN prep instructions and find out if there is anything else she needs to know before her proc this Friday.

## 2022-06-24 ENCOUNTER — HOSPITAL ENCOUNTER (OUTPATIENT)
Facility: HOSPITAL | Age: 57
Setting detail: HOSPITAL OUTPATIENT SURGERY
Discharge: HOME | End: 2022-06-24
Attending: INTERNAL MEDICINE | Admitting: INTERNAL MEDICINE
Payer: COMMERCIAL

## 2022-06-24 ENCOUNTER — ANESTHESIA (OUTPATIENT)
Dept: ENDOSCOPY | Facility: HOSPITAL | Age: 57
End: 2022-06-24
Payer: COMMERCIAL

## 2022-06-24 ENCOUNTER — ANESTHESIA EVENT (OUTPATIENT)
Dept: ENDOSCOPY | Facility: HOSPITAL | Age: 57
End: 2022-06-24
Payer: COMMERCIAL

## 2022-06-24 VITALS
HEART RATE: 50 BPM | RESPIRATION RATE: 18 BRPM | BODY MASS INDEX: 48.82 KG/M2 | TEMPERATURE: 98 F | OXYGEN SATURATION: 99 % | WEIGHT: 293 LBS | DIASTOLIC BLOOD PRESSURE: 54 MMHG | SYSTOLIC BLOOD PRESSURE: 111 MMHG | HEIGHT: 65 IN

## 2022-06-24 DIAGNOSIS — Z86.010 PERSONAL HISTORY OF COLONIC POLYPS: ICD-10-CM

## 2022-06-24 PROCEDURE — 45380 COLONOSCOPY AND BIOPSY: CPT | Performed by: INTERNAL MEDICINE

## 2022-06-24 PROCEDURE — 0DBP8ZX EXCISION OF RECTUM, VIA NATURAL OR ARTIFICIAL OPENING ENDOSCOPIC, DIAGNOSTIC: ICD-10-PCS | Performed by: INTERNAL MEDICINE

## 2022-06-24 PROCEDURE — 0DBM8ZX EXCISION OF DESCENDING COLON, VIA NATURAL OR ARTIFICIAL OPENING ENDOSCOPIC, DIAGNOSTIC: ICD-10-PCS | Performed by: INTERNAL MEDICINE

## 2022-06-24 RX ORDER — SODIUM CHLORIDE, SODIUM LACTATE, POTASSIUM CHLORIDE, CALCIUM CHLORIDE 600; 310; 30; 20 MG/100ML; MG/100ML; MG/100ML; MG/100ML
INJECTION, SOLUTION INTRAVENOUS CONTINUOUS
Status: DISCONTINUED | OUTPATIENT
Start: 2022-06-24 | End: 2022-06-24

## 2022-06-24 RX ORDER — LIDOCAINE HYDROCHLORIDE 10 MG/ML
INJECTION, SOLUTION EPIDURAL; INFILTRATION; INTRACAUDAL; PERINEURAL AS NEEDED
Status: DISCONTINUED | OUTPATIENT
Start: 2022-06-24 | End: 2022-06-24 | Stop reason: SURG

## 2022-06-24 RX ADMIN — LIDOCAINE HYDROCHLORIDE 50 MG: 10 INJECTION, SOLUTION EPIDURAL; INFILTRATION; INTRACAUDAL; PERINEURAL at 11:00:00

## 2022-06-24 NOTE — OPERATIVE REPORT
[facility] Endoscopy Report      Preoperative Diagnosis:  - history of colon polyps       Postoperative Diagnosis:  - colon polyp x 4  - diverticulosis  - small internal hemorrhoids      Procedure:    Colonoscopy       Surgeon:  Isaiah Angel M.D. Anesthesia:  MAC sedation    Technique:  After informed consent, the patient was placed in the left lateral recumbent position. Digital rectal examination revealed no palpable intraluminal abnormalities. An Olympus variable stiffness 190 series HD colonoscope was inserted into the rectum and advanced under direct vision by following the lumen to the cecum. The colon was examined upon withdrawal in the left lateral position. The procedure was well tolerated without immediate complication. Findings:  The preparation of the colon was good. The terminal ileum was examined for 4 cm and visually normal.  The ileocecal valve was well preserved. The visualized colonic mucosa from the cecum to the anal verge was normal with an intact vascular pattern. Colon polyps x4 removed as follows;  -Descending x1, diminutive removed by cold forceps technique. -Rectum x3, each of these was diminutive and removed by cold forceps technique. All polypectomy sites inspected and found to be free of bleeding and specimens retrieved and sent for analysis. Diverticulosis noted in the sigmoid colon, notes of diverticulitis. Small internal hemorrhoids noted on retroflexed view. Estimated blood loss-insignificant  Specimens-colon polyps      Impression:  - colon polyp x 4  - diverticulosis  - small internal hemorrhoids    Recommendations:  - Post polypectomy instructions given  - Repeat colonoscopy in 3- 7 years  - High fiber diet for diverticular disease  - Symptomatic treatment of hemorrhoids          Mark Eubanks MD  6/24/2022  11:27 AM

## 2022-06-27 ENCOUNTER — TELEPHONE (OUTPATIENT)
Dept: GASTROENTEROLOGY | Facility: CLINIC | Age: 57
End: 2022-06-27

## 2022-06-27 NOTE — TELEPHONE ENCOUNTER
----- Message from Cole Rosas MD sent at 6/27/2022  4:21 PM CDT -----  I wanted to get back to you with your colonoscopy results. You had 4 colon polyps removed which were benign. I would advise a repeat colonoscopy in 7 years to make sure no new polyps are forming. You also have internal hemorrhoids and diverticulosis. Please stay on a high fiber diet and call with any questions.

## 2022-07-15 ENCOUNTER — OFFICE VISIT (OUTPATIENT)
Dept: OBGYN CLINIC | Facility: CLINIC | Age: 57
End: 2022-07-15
Payer: COMMERCIAL

## 2022-07-15 ENCOUNTER — TELEPHONE (OUTPATIENT)
Dept: OBGYN CLINIC | Facility: CLINIC | Age: 57
End: 2022-07-15

## 2022-07-15 ENCOUNTER — HOSPITAL ENCOUNTER (OUTPATIENT)
Dept: ULTRASOUND IMAGING | Facility: HOSPITAL | Age: 57
Discharge: HOME | End: 2022-07-15
Attending: OBSTETRICS & GYNECOLOGY
Payer: COMMERCIAL

## 2022-07-15 ENCOUNTER — LAB ENCOUNTER (OUTPATIENT)
Dept: LAB | Facility: HOSPITAL | Age: 57
End: 2022-07-15
Attending: OBSTETRICS & GYNECOLOGY
Payer: COMMERCIAL

## 2022-07-15 VITALS
HEART RATE: 55 BPM | WEIGHT: 293 LBS | DIASTOLIC BLOOD PRESSURE: 54 MMHG | SYSTOLIC BLOOD PRESSURE: 117 MMHG | BODY MASS INDEX: 60 KG/M2

## 2022-07-15 DIAGNOSIS — N91.2 AMENORRHEA: ICD-10-CM

## 2022-07-15 DIAGNOSIS — N91.2 AMENORRHEA: Primary | ICD-10-CM

## 2022-07-15 DIAGNOSIS — T83.32XA INTRAUTERINE CONTRACEPTIVE DEVICE THREADS LOST, INITIAL ENCOUNTER: ICD-10-CM

## 2022-07-15 DIAGNOSIS — Z01.419 ENCOUNTER FOR GYNECOLOGICAL EXAMINATION WITHOUT ABNORMAL FINDING: ICD-10-CM

## 2022-07-15 LAB
ESTRADIOL SERPL-MCNC: 27.9 PG/ML
FSH SERPL-ACNC: 33.6 MIU/ML
LH SERPL-ACNC: 21 MIU/ML

## 2022-07-15 PROCEDURE — 99396 PREV VISIT EST AGE 40-64: CPT | Performed by: OBSTETRICS & GYNECOLOGY

## 2022-07-15 PROCEDURE — 99212 OFFICE O/P EST SF 10 MIN: CPT | Performed by: OBSTETRICS & GYNECOLOGY

## 2022-07-15 PROCEDURE — 3074F SYST BP LT 130 MM HG: CPT | Performed by: OBSTETRICS & GYNECOLOGY

## 2022-07-15 PROCEDURE — 82670 ASSAY OF TOTAL ESTRADIOL: CPT

## 2022-07-15 PROCEDURE — 76830 TRANSVAGINAL US NON-OB: CPT | Performed by: OBSTETRICS & GYNECOLOGY

## 2022-07-15 PROCEDURE — 83001 ASSAY OF GONADOTROPIN (FSH): CPT

## 2022-07-15 PROCEDURE — 36415 COLL VENOUS BLD VENIPUNCTURE: CPT

## 2022-07-15 PROCEDURE — 83002 ASSAY OF GONADOTROPIN (LH): CPT

## 2022-07-15 PROCEDURE — 76856 US EXAM PELVIC COMPLETE: CPT | Performed by: OBSTETRICS & GYNECOLOGY

## 2022-07-15 PROCEDURE — 3078F DIAST BP <80 MM HG: CPT | Performed by: OBSTETRICS & GYNECOLOGY

## 2022-07-17 NOTE — PROGRESS NOTES
Hormone levels are in the menopausal range,therefore there is no reason to replace the  IUD when it is removed next year

## 2022-07-18 LAB — HPV I/H RISK 1 DNA SPEC QL NAA+PROBE: NEGATIVE

## 2022-08-04 ENCOUNTER — TELEPHONE (OUTPATIENT)
Dept: GASTROENTEROLOGY | Facility: CLINIC | Age: 57
End: 2022-08-04

## 2022-08-04 NOTE — TELEPHONE ENCOUNTER
Pt called in with billing department on the line stating that the procedure was coded wrong and it needs to please be updated as routine. Pt states she has a large bill.  Please call pt thanks

## 2022-08-04 NOTE — TELEPHONE ENCOUNTER
RN clinical staff not to receive messages about coding/billing for procedures that have already taken place.     Manager Celina moncada

## 2022-08-05 NOTE — TELEPHONE ENCOUNTER
Spoke to pt and informed pt procedure was coded correctly as diagnostic. Pt has hx of colon polyps therefore her CLN is not considered routine. Pt very appreciative and verbalized understanding.

## 2022-08-13 ENCOUNTER — OFFICE VISIT (OUTPATIENT)
Dept: FAMILY MEDICINE CLINIC | Facility: CLINIC | Age: 57
End: 2022-08-13
Payer: COMMERCIAL

## 2022-08-13 VITALS
WEIGHT: 293 LBS | HEART RATE: 68 BPM | OXYGEN SATURATION: 98 % | TEMPERATURE: 98 F | HEIGHT: 65 IN | DIASTOLIC BLOOD PRESSURE: 84 MMHG | BODY MASS INDEX: 48.82 KG/M2 | SYSTOLIC BLOOD PRESSURE: 110 MMHG

## 2022-08-13 DIAGNOSIS — Z23 NEED FOR PNEUMOCOCCAL VACCINATION: ICD-10-CM

## 2022-08-13 DIAGNOSIS — R82.90 ABNORMAL FINDING ON URINALYSIS: ICD-10-CM

## 2022-08-13 DIAGNOSIS — M67.472 GANGLION CYST OF LEFT FOOT: ICD-10-CM

## 2022-08-13 DIAGNOSIS — J30.9 ALLERGIC RHINITIS, UNSPECIFIED SEASONALITY, UNSPECIFIED TRIGGER: ICD-10-CM

## 2022-08-13 DIAGNOSIS — B35.1 ONYCHOMYCOSIS OF LEFT GREAT TOE: Primary | ICD-10-CM

## 2022-08-13 DIAGNOSIS — I10 ESSENTIAL HYPERTENSION: ICD-10-CM

## 2022-08-13 LAB
BILIRUB UR QL: NEGATIVE
CLARITY UR: CLEAR
COLOR UR: YELLOW
GLUCOSE UR-MCNC: NEGATIVE MG/DL
HGB UR QL STRIP.AUTO: NEGATIVE
KETONES UR-MCNC: NEGATIVE MG/DL
NITRITE UR QL STRIP.AUTO: NEGATIVE
PH UR: 5.5 [PH] (ref 5–8)
PROT UR-MCNC: NEGATIVE MG/DL
SP GR UR STRIP: 1.02 (ref 1–1.03)
UROBILINOGEN UR STRIP-ACNC: 0.2

## 2022-08-13 PROCEDURE — 90471 IMMUNIZATION ADMIN: CPT | Performed by: FAMILY MEDICINE

## 2022-08-13 PROCEDURE — 90677 PCV20 VACCINE IM: CPT | Performed by: FAMILY MEDICINE

## 2022-08-13 PROCEDURE — 99214 OFFICE O/P EST MOD 30 MIN: CPT | Performed by: FAMILY MEDICINE

## 2022-08-13 PROCEDURE — 3074F SYST BP LT 130 MM HG: CPT | Performed by: FAMILY MEDICINE

## 2022-08-13 PROCEDURE — 3079F DIAST BP 80-89 MM HG: CPT | Performed by: FAMILY MEDICINE

## 2022-08-13 PROCEDURE — 3008F BODY MASS INDEX DOCD: CPT | Performed by: FAMILY MEDICINE

## 2022-08-13 RX ORDER — LOSARTAN POTASSIUM 50 MG/1
TABLET ORAL
Qty: 90 TABLET | Refills: 1 | Status: SHIPPED | OUTPATIENT
Start: 2022-08-13

## 2022-08-13 RX ORDER — LEVOCETIRIZINE DIHYDROCHLORIDE 5 MG/1
5 TABLET, FILM COATED ORAL EVERY EVENING
Qty: 30 TABLET | Refills: 1 | Status: SHIPPED | OUTPATIENT
Start: 2022-08-13

## 2022-08-13 RX ORDER — HYDROCHLOROTHIAZIDE 12.5 MG/1
TABLET ORAL
Qty: 90 TABLET | Refills: 1 | Status: SHIPPED | OUTPATIENT
Start: 2022-08-13

## 2022-08-13 NOTE — PATIENT INSTRUCTIONS
Patient be referred to podiatry to evaluate ganglion cyst as well as onychomycosis. Urinalysis repeat and lieu of the last urinalysis suggestive of UTI, but the patient was never symptomatic. Recommend weight loss via daily exercising and consistent healthy dietary changes. Medication reviewed and renewed where needed and appropriate. Comply with medications. Monitor blood pressures and record at home. Limit salt intake. Patient is due for pneumococcal 13. We will see if we stopped. We are currently out of the Prevnar 20.

## 2022-08-18 ENCOUNTER — OFFICE VISIT (OUTPATIENT)
Dept: PODIATRY CLINIC | Facility: CLINIC | Age: 57
End: 2022-08-18
Payer: COMMERCIAL

## 2022-08-18 ENCOUNTER — TELEPHONE (OUTPATIENT)
Dept: FAMILY MEDICINE CLINIC | Facility: CLINIC | Age: 57
End: 2022-08-18

## 2022-08-18 VITALS — SYSTOLIC BLOOD PRESSURE: 122 MMHG | DIASTOLIC BLOOD PRESSURE: 74 MMHG

## 2022-08-18 DIAGNOSIS — B35.1 ONYCHOMYCOSIS: Primary | ICD-10-CM

## 2022-08-18 DIAGNOSIS — M67.472 GANGLION CYST OF LEFT FOOT: ICD-10-CM

## 2022-08-18 DIAGNOSIS — B35.1 ONYCHOMYCOSIS OF LEFT GREAT TOE: Primary | ICD-10-CM

## 2022-08-18 DIAGNOSIS — M19.072 ARTHROSIS OF MIDFOOT, LEFT: ICD-10-CM

## 2022-08-18 DIAGNOSIS — M89.8X7 EXOSTOSIS OF LEFT FOOT: ICD-10-CM

## 2022-08-18 PROCEDURE — 99203 OFFICE O/P NEW LOW 30 MIN: CPT | Performed by: PODIATRIST

## 2022-08-18 PROCEDURE — 3074F SYST BP LT 130 MM HG: CPT | Performed by: PODIATRIST

## 2022-08-18 PROCEDURE — 3078F DIAST BP <80 MM HG: CPT | Performed by: PODIATRIST

## 2022-08-18 PROCEDURE — 73630 X-RAY EXAM OF FOOT: CPT | Performed by: PODIATRIST

## 2022-08-18 PROCEDURE — 20612 ASPIRATE/INJ GANGLION CYST: CPT | Performed by: PODIATRIST

## 2022-08-18 RX ORDER — TRIAMCINOLONE ACETONIDE 40 MG/ML
20 INJECTION, SUSPENSION INTRA-ARTICULAR; INTRAMUSCULAR ONCE
Status: COMPLETED | OUTPATIENT
Start: 2022-08-18 | End: 2022-08-18

## 2022-08-18 NOTE — TELEPHONE ENCOUNTER
Patient is requesting referral.     Name of specialist and specialty department : Podiatrist/ Dr. Clarissa White  Reason for visit with the specialist: follow up will need 2 more visits  Address of the specialist office: [address]  Appointment date: 09/19/22 they have a sooner appointment available but waiting on referral.          CSS informed patient the turnaround time for referral is 5-7 business days. Patient was informed to check their [employer] account for referral status.

## 2022-08-18 NOTE — PROGRESS NOTES
Per Dr. Weston Wooten draw up 0.5ml of 0.5% Marcaine and 0.5ml of Kenalog 40 for injection to left foot.

## 2022-08-19 NOTE — TELEPHONE ENCOUNTER
Dr. Emerita Gregory,    Patient requesting referral for follow up appointments with Dr. Tejas Johnson. Pended referral please review diagnosis and sign off if you agree. Thank you.   Jorge Arce

## 2022-08-19 NOTE — TELEPHONE ENCOUNTER
Called patient, confirmed name and . Informed her that her referral for podiatry has been signed. Patient verbalized understanding and agrees to schedule. She is inquiring about her Optha referral. [facility], please advise.

## 2022-08-23 ENCOUNTER — IMMUNIZATION (OUTPATIENT)
Dept: LAB | Age: 57
End: 2022-08-23
Attending: EMERGENCY MEDICINE
Payer: COMMERCIAL

## 2022-08-23 DIAGNOSIS — Z23 NEED FOR VACCINATION: Primary | ICD-10-CM

## 2022-08-23 PROCEDURE — 0094A SARSCOV2 VAC 50MCG/0.5ML IM: CPT

## 2022-09-04 DIAGNOSIS — J30.9 ALLERGIC RHINITIS, UNSPECIFIED SEASONALITY, UNSPECIFIED TRIGGER: ICD-10-CM

## 2022-09-04 NOTE — TELEPHONE ENCOUNTER
Refill passed per [address] protocol. Multiple drug warning    Requested Prescriptions   Pending Prescriptions Disp Refills    LEVOCETIRIZINE 5 MG Oral Tab [Pharmacy Med Name: LEVOCETIRIZINE 5 MG TABLET] 30 tablet 1     Sig: TAKE 1 TABLET BY MOUTH EVERY DAY IN THE EVENING        Allergy Medication Protocol Passed - 9/4/2022 12:30 PM        Passed - In person appointment or virtual visit in the past 12 mos or appointment in next 3 mos       Recent Outpatient Visits              2 weeks ago Onychomycosis    [address], [address] SOTOMAY[address] Celine Ray[place], [place]    Office Visit    3 weeks ago Onychomycosis of left great toe    [address], [facility] [facility] [place]    Office Visit    1 month ago [address] [facility], [address], Jaspreet Wilkes MD    Office Visit    4 months ago Keratoconus of both eyes    [address], [facility] [address], [place]    Office Visit    9 months ago Impacted cerumen of left ear    [address] [facility]    Office Visit     Future Appointments         Provider Department Appt Notes    In 1 week Olivia Self, [facility] Referral needed and told pt.     In 2 months Shaan Morelos  [facility] [place], [address]                      Recent Outpatient Visits              2 weeks ago Onychomycosis    [address] [facility], [address] Celine Ray[place], [place]    Office Visit    3 weeks ago Onychomycosis of left great toe    [facility], Leidy Nicolas DO    Office Visit    1 month ago [address] [facility], [address], Jaspreet Wilkes MD    Office Visit    4 months ago Keratoconus of both eyes    [address], [address], Leidy Wolfe [place]    Office Visit    9 months ago Impacted cerumen of left ear    [address] [facility], [facility]    Office Visit            Future Appointments         Provider Department Appt Notes    In 1 week Selam Self, DPJULIA [facility], [address] [facility] [address] FU Referral needed and told pt.     In 2 months Gregorio Dinh,  [facility], [address]

## 2022-09-05 RX ORDER — LEVOCETIRIZINE DIHYDROCHLORIDE 5 MG/1
5 TABLET, FILM COATED ORAL EVERY EVENING
Qty: 90 TABLET | Refills: 1 | Status: SHIPPED | OUTPATIENT
Start: 2022-09-05

## 2022-09-06 ENCOUNTER — TELEPHONE (OUTPATIENT)
Dept: OBGYN CLINIC | Facility: CLINIC | Age: 57
End: 2022-09-06

## 2022-09-06 NOTE — TELEPHONE ENCOUNTER
At patient's annual in July Dr Koroma could not find her IUD. Ultrasound was done and it is there. She has been lightly spotting for the past two weeks. There was no spotting prior to this. She is wondering if it would be best to have it removed. Please advise.

## 2022-09-06 NOTE — TELEPHONE ENCOUNTER
Pt was seen on 7/15/2022 and IUD was not in the correct place. Pt went for an US and it confirmed the IUD is not in the correct place. Pt states she was told she did not need to have it removed yet. Pt states about 2 weeks ago she started experiencing some bright red spotting. Pt states it's mostly with wiping. Pt also has some mild cramping and left sided pelvic pain. Pt is  and states the IUD was supposed to stop her bleeding. Pt feels she \"is going backwards\" with the spotting and would like the IUD removed. Pt states she would be fine getting another one if CAP recommends it. Pt informed message will be sent to CAP for recs on removing IUD.

## 2022-09-07 NOTE — TELEPHONE ENCOUNTER
Pt informed we are still waiting for CAP's recs. Pt asked that a voicemail be left with recs if she does not answer her phone. Message to CAP.

## 2022-09-09 ENCOUNTER — OFFICE VISIT (OUTPATIENT)
Dept: OBGYN CLINIC | Facility: CLINIC | Age: 57
End: 2022-09-09
Payer: COMMERCIAL

## 2022-09-09 VITALS
BODY MASS INDEX: 60 KG/M2 | SYSTOLIC BLOOD PRESSURE: 117 MMHG | DIASTOLIC BLOOD PRESSURE: 55 MMHG | WEIGHT: 293 LBS | HEART RATE: 68 BPM

## 2022-09-09 DIAGNOSIS — Z30.432 ENCOUNTER FOR REMOVAL OF INTRAUTERINE CONTRACEPTIVE DEVICE: Primary | ICD-10-CM

## 2022-09-09 PROCEDURE — 3074F SYST BP LT 130 MM HG: CPT | Performed by: OBSTETRICS & GYNECOLOGY

## 2022-09-09 PROCEDURE — 3078F DIAST BP <80 MM HG: CPT | Performed by: OBSTETRICS & GYNECOLOGY

## 2022-09-09 PROCEDURE — 58301 REMOVE INTRAUTERINE DEVICE: CPT | Performed by: OBSTETRICS & GYNECOLOGY

## 2022-09-09 NOTE — PROCEDURES
IUD Removal     Pregnancy Results: negative from n/a test   Consent signed. Procedure discussed with the patient in detail including indication, risks, benefits, alternatives and complications. Pelvic Exam Findings:      Procedure:  Speculum placed in the vagina. Betadine wash of vagina and cervix. A Ring Forceps was used to grasp IUD strings. Mirena IUD was removed without difficulty. The patient tolerated the procedure well. Visit Plan:  Discharge instructions were reviewed with the patient.   If bleeding recurs then pt will rtc for EMB

## 2022-09-11 ENCOUNTER — HOSPITAL ENCOUNTER (OUTPATIENT)
Age: 57
Discharge: HOME | End: 2022-09-11
Payer: COMMERCIAL

## 2022-09-11 VITALS
TEMPERATURE: 98 F | HEIGHT: 65 IN | SYSTOLIC BLOOD PRESSURE: 156 MMHG | DIASTOLIC BLOOD PRESSURE: 76 MMHG | WEIGHT: 293 LBS | OXYGEN SATURATION: 99 % | RESPIRATION RATE: 18 BRPM | HEART RATE: 85 BPM | BODY MASS INDEX: 48.82 KG/M2

## 2022-09-11 DIAGNOSIS — Z20.822 LAB TEST NEGATIVE FOR COVID-19 VIRUS: ICD-10-CM

## 2022-09-11 DIAGNOSIS — B34.9 VIRAL ILLNESS: ICD-10-CM

## 2022-09-11 DIAGNOSIS — J02.0 STREPTOCOCCAL SORE THROAT: Primary | ICD-10-CM

## 2022-09-11 DIAGNOSIS — Z11.52 ENCOUNTER FOR SCREENING FOR COVID-19: ICD-10-CM

## 2022-09-11 LAB
S PYO AG THROAT QL: POSITIVE
SARS-COV-2 RNA RESP QL NAA+PROBE: NOT DETECTED

## 2022-09-11 PROCEDURE — 99213 OFFICE O/P EST LOW 20 MIN: CPT | Performed by: NURSE PRACTITIONER

## 2022-09-11 PROCEDURE — 87880 STREP A ASSAY W/OPTIC: CPT | Performed by: NURSE PRACTITIONER

## 2022-09-11 PROCEDURE — U0002 COVID-19 LAB TEST NON-CDC: HCPCS | Performed by: NURSE PRACTITIONER

## 2022-09-11 RX ORDER — PENICILLIN V POTASSIUM 500 MG/1
500 TABLET ORAL 2 TIMES DAILY
Qty: 20 TABLET | Refills: 0 | Status: SHIPPED | OUTPATIENT
Start: 2022-09-11 | End: 2022-09-21

## 2022-09-12 ENCOUNTER — TELEPHONE (OUTPATIENT)
Dept: OBGYN CLINIC | Facility: CLINIC | Age: 57
End: 2022-09-12

## 2022-09-12 NOTE — TELEPHONE ENCOUNTER
Patient with ongoing irregular bleeding. Was scheduled 9/9/22 for IUD removal. Told if bleeding persists, should return for [facility]. At this time pt is going through a heavy pad about 4 times per day. Cramping rated anywhere from 1 to 3 to 9 out of 10. She has not taken anything for pain. She also wants to let us know she just tested positive for strep yesterday. She is on antibiotics. ER precautions reviewed with pt. Advised I will check with CAP for timing of EMBX. CAP- how soon would you like to see her back for [facility]?

## 2022-09-16 NOTE — TELEPHONE ENCOUNTER
Please give her the first available 10 min nappt for EMB. Needs to take motrin 600mg 30 min before appt if possible and no allergies. If she has not had a TV US to measure the endometrial stripe then please order one. Thanks.

## 2022-09-16 NOTE — TELEPHONE ENCOUNTER
Notified pt of CAP recs. Pt accepts first available appt on 10/6. Pt had pelvis US on 7/15/22. Message to CAP to ask if pt needs a f/u US?

## 2022-09-20 ENCOUNTER — TELEPHONE (OUTPATIENT)
Dept: PODIATRY CLINIC | Facility: CLINIC | Age: 57
End: 2022-09-20

## 2022-09-20 ENCOUNTER — PATIENT MESSAGE (OUTPATIENT)
Dept: FAMILY MEDICINE CLINIC | Facility: CLINIC | Age: 57
End: 2022-09-20

## 2022-09-21 ENCOUNTER — TELEPHONE (OUTPATIENT)
Dept: FAMILY MEDICINE CLINIC | Facility: CLINIC | Age: 57
End: 2022-09-21

## 2022-09-21 DIAGNOSIS — J45.20 MILD INTERMITTENT ASTHMA WITHOUT COMPLICATION: ICD-10-CM

## 2022-09-21 DIAGNOSIS — J21.9 ACUTE BRONCHIOLITIS DUE TO UNSPECIFIED ORGANISM: Primary | ICD-10-CM

## 2022-09-21 RX ORDER — ALBUTEROL SULFATE 90 UG/1
2 AEROSOL, METERED RESPIRATORY (INHALATION) EVERY 6 HOURS PRN
Qty: 1 EACH | Refills: 0 | Status: SHIPPED | OUTPATIENT
Start: 2022-09-21

## 2022-09-21 RX ORDER — DEXTROMETHORPHAN HYDROBROMIDE AND PROMETHAZINE HYDROCHLORIDE 15; 6.25 MG/5ML; MG/5ML
5 SYRUP ORAL 4 TIMES DAILY PRN
Qty: 240 ML | Refills: 0 | Status: SHIPPED | OUTPATIENT
Start: 2022-09-21

## 2022-09-21 NOTE — TELEPHONE ENCOUNTER
Condition update:  Seen at Urgent Care 9/11/2022, positive for strep, treated with penicillin for 10 days. Finished medicine yesterday, still has strong cough, cannot get rid of it. Mild wheezing at night, interfering with sleep. Tried Dayquill and Nyquill for cough. In past was prescribed albuterol inhaler but prescription is out of date. Declined appointment offer with alternate provider as Dr Jass Talavera is out of the office today. States is ok to wait until tomorrow for Dr Victor General recommendations. No audible wheeze or respiratory distress noted during phone conversation. She reports if symptoms worsen will go to [facility]. Dr Jass Talavera, attempt to reorder her albuterol shows it is not preferred product but lists alternatives preferred by insurance. Please advise. Any recommendations for cough?

## 2022-09-21 NOTE — TELEPHONE ENCOUNTER
From: Joe Tyler  To: Juanpablo Rocha DO  Sent: 9/20/2022 6:29 PM CDT  Subject: Strep Throat - urgent care prescribed Penicillin     For 10 days starting September 11, 2022. I completed last does today. Penicillin  mg.     I continue to have a hard cough and some light wheezing. And starting a running nose. Please call me at [phone] . My pharmacist is [facility] on [address] [place] [address]. Please advise.

## 2022-10-06 ENCOUNTER — OFFICE VISIT (OUTPATIENT)
Dept: OBGYN CLINIC | Facility: CLINIC | Age: 57
End: 2022-10-06
Payer: COMMERCIAL

## 2022-10-06 VITALS
HEART RATE: 57 BPM | BODY MASS INDEX: 60 KG/M2 | DIASTOLIC BLOOD PRESSURE: 73 MMHG | SYSTOLIC BLOOD PRESSURE: 114 MMHG | WEIGHT: 293 LBS

## 2022-10-06 DIAGNOSIS — N95.0 POSTMENOPAUSAL BLEEDING: Primary | ICD-10-CM

## 2022-10-06 PROCEDURE — 3074F SYST BP LT 130 MM HG: CPT | Performed by: OBSTETRICS & GYNECOLOGY

## 2022-10-06 PROCEDURE — 58100 BIOPSY OF UTERUS LINING: CPT | Performed by: OBSTETRICS & GYNECOLOGY

## 2022-10-06 PROCEDURE — 3078F DIAST BP <80 MM HG: CPT | Performed by: OBSTETRICS & GYNECOLOGY

## 2022-10-06 NOTE — PROCEDURES
Endometrial Biopsy    Pre-Procedure Care:   Consent was obtained. Procedure/risks were explained. Questions were answered. Correct patient was identified. Correct side and site were confirmed. Pregnancy Results: negative from n/a test   Birth control method(s) used:  postmenopausal    Pre-Medications: The patient was premedicated with Ibuprofen . Description of Procedure:  Under satisfactory analgesia, the patient was prepped and draped in the dorsal lithotomy position. A bivalve speculum was placed in the vagina and the cervix was prepped with Betadine solution. Single tooth tenaculum placed at the  12o'clock position. The uterine cavity was sounded at 8  cm. The endometrial cavity was curetted for pipelle tissue sampling, 1   passes. Specimen was sent to pathology. The single tooth tenaculum was removed. Good hemostasis was noted. There were no complications. There was no blood loss. Discharge instructions were provided to the patient. Visit Plan:  Await final pathology prior to treatment.

## 2022-10-11 ENCOUNTER — TELEPHONE (OUTPATIENT)
Dept: CASE MANAGEMENT | Age: 57
End: 2022-10-11

## 2022-10-11 DIAGNOSIS — H18.603 KERATOCONUS OF BOTH EYES: Primary | ICD-10-CM

## 2022-10-11 NOTE — TELEPHONE ENCOUNTER
Dr. Cindy Santa,    Patient requesting referral to see Branden Ascencio for contact lenses for her disease Keratoconus of both eyes. Pended referral please review diagnosis and sign off if you agree. Thank you.   Jorge Arce

## 2022-10-17 ENCOUNTER — OFFICE VISIT (OUTPATIENT)
Dept: PODIATRY CLINIC | Facility: CLINIC | Age: 57
End: 2022-10-17
Payer: COMMERCIAL

## 2022-10-17 DIAGNOSIS — M89.8X7 EXOSTOSIS OF LEFT FOOT: ICD-10-CM

## 2022-10-17 DIAGNOSIS — M67.472 GANGLION CYST OF LEFT FOOT: Primary | ICD-10-CM

## 2022-10-17 DIAGNOSIS — M19.072 ARTHROSIS OF MIDFOOT, LEFT: ICD-10-CM

## 2022-10-17 DIAGNOSIS — B35.1 ONYCHOMYCOSIS: ICD-10-CM

## 2022-10-17 PROCEDURE — 99213 OFFICE O/P EST LOW 20 MIN: CPT | Performed by: PODIATRIST

## 2022-10-31 ENCOUNTER — TELEPHONE (OUTPATIENT)
Dept: PODIATRY CLINIC | Facility: CLINIC | Age: 57
End: 2022-10-31

## 2022-10-31 DIAGNOSIS — M89.8X7 EXOSTOSIS OF LEFT FOOT: Primary | ICD-10-CM

## 2022-10-31 DIAGNOSIS — M19.072 ARTHROSIS OF MIDFOOT, LEFT: ICD-10-CM

## 2022-10-31 NOTE — TELEPHONE ENCOUNTER
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

## 2022-10-31 NOTE — TELEPHONE ENCOUNTER
Patient requesting surgery on 11/16/22 which was scheduled this date at [facility]. Managed care order placed this date. Patient told I would call her back as soon as confirmed to review pre-op instructions.

## 2022-11-05 ENCOUNTER — OFFICE VISIT (OUTPATIENT)
Dept: FAMILY MEDICINE CLINIC | Facility: CLINIC | Age: 57
End: 2022-11-05
Payer: COMMERCIAL

## 2022-11-05 VITALS
HEART RATE: 65 BPM | TEMPERATURE: 97 F | HEIGHT: 65 IN | SYSTOLIC BLOOD PRESSURE: 110 MMHG | BODY MASS INDEX: 48.82 KG/M2 | DIASTOLIC BLOOD PRESSURE: 78 MMHG | WEIGHT: 293 LBS

## 2022-11-05 DIAGNOSIS — Z01.812 ENCOUNTER FOR PRE-OPERATIVE LABORATORY TESTING: ICD-10-CM

## 2022-11-05 DIAGNOSIS — I11.9 HYPERTENSIVE LEFT VENTRICULAR HYPERTROPHY, WITHOUT HEART FAILURE: Primary | ICD-10-CM

## 2022-11-05 DIAGNOSIS — H18.603 KERATOCONUS OF BOTH EYES: ICD-10-CM

## 2022-11-05 DIAGNOSIS — J45.20 MILD INTERMITTENT ASTHMA WITHOUT COMPLICATION: ICD-10-CM

## 2022-11-05 DIAGNOSIS — I10 ESSENTIAL HYPERTENSION: ICD-10-CM

## 2022-11-05 DIAGNOSIS — Z01.818 PRE-OPERATIVE CLEARANCE: ICD-10-CM

## 2022-11-05 LAB
ANION GAP SERPL CALC-SCNC: 7 MMOL/L (ref 0–18)
BASOPHILS # BLD AUTO: 0.07 X10(3) UL (ref 0–0.2)
BASOPHILS NFR BLD AUTO: 0.8 %
BILIRUB UR QL: NEGATIVE
BUN BLD-MCNC: 19 MG/DL (ref 7–18)
BUN/CREAT SERPL: 23.5 (ref 10–20)
CALCIUM BLD-MCNC: 8.4 MG/DL (ref 8.5–10.1)
CHLORIDE SERPL-SCNC: 105 MMOL/L (ref 98–112)
CO2 SERPL-SCNC: 26 MMOL/L (ref 21–32)
COLOR UR: YELLOW
CREAT BLD-MCNC: 0.81 MG/DL
DEPRECATED RDW RBC AUTO: 51.2 FL (ref 35.1–46.3)
EOSINOPHIL # BLD AUTO: 0.17 X10(3) UL (ref 0–0.7)
EOSINOPHIL NFR BLD AUTO: 1.8 %
ERYTHROCYTE [DISTWIDTH] IN BLOOD BY AUTOMATED COUNT: 15.6 % (ref 11–15)
FASTING STATUS PATIENT QL REPORTED: YES
GFR SERPLBLD BASED ON 1.73 SQ M-ARVRAT: 85 ML/MIN/1.73M2 (ref 60–?)
GLUCOSE BLD-MCNC: 88 MG/DL (ref 70–99)
GLUCOSE UR-MCNC: NEGATIVE MG/DL
HCT VFR BLD AUTO: 39.7 %
HGB BLD-MCNC: 12.5 G/DL
IMM GRANULOCYTES # BLD AUTO: 0.01 X10(3) UL (ref 0–1)
IMM GRANULOCYTES NFR BLD: 0.1 %
INR BLD: 1.01 (ref 0.85–1.16)
KETONES UR-MCNC: NEGATIVE MG/DL
LYMPHOCYTES # BLD AUTO: 2.63 X10(3) UL (ref 1–4)
LYMPHOCYTES NFR BLD AUTO: 28.3 %
MCH RBC QN AUTO: 28.1 PG (ref 26–34)
MCHC RBC AUTO-ENTMCNC: 31.5 G/DL (ref 31–37)
MCV RBC AUTO: 89.2 FL
MONOCYTES # BLD AUTO: 0.67 X10(3) UL (ref 0.1–1)
MONOCYTES NFR BLD AUTO: 7.2 %
NEUTROPHILS # BLD AUTO: 5.73 X10 (3) UL (ref 1.5–7.7)
NEUTROPHILS # BLD AUTO: 5.73 X10(3) UL (ref 1.5–7.7)
NEUTROPHILS NFR BLD AUTO: 61.8 %
NITRITE UR QL STRIP.AUTO: NEGATIVE
OSMOLALITY SERPL CALC.SUM OF ELEC: 288 MOSM/KG (ref 275–295)
PH UR: 6 [PH] (ref 5–8)
PLATELET # BLD AUTO: 275 10(3)UL (ref 150–450)
POTASSIUM SERPL-SCNC: 3.5 MMOL/L (ref 3.5–5.1)
PROT UR-MCNC: NEGATIVE MG/DL
PROTHROMBIN TIME: 13.2 SECONDS (ref 11.6–14.8)
RBC # BLD AUTO: 4.45 X10(6)UL
SODIUM SERPL-SCNC: 138 MMOL/L (ref 136–145)
SP GR UR STRIP: 1.01 (ref 1–1.03)
UROBILINOGEN UR STRIP-ACNC: <2
VIT C UR-MCNC: NEGATIVE MG/DL
WBC # BLD AUTO: 9.3 X10(3) UL (ref 4–11)

## 2022-11-05 PROCEDURE — 99214 OFFICE O/P EST MOD 30 MIN: CPT | Performed by: FAMILY MEDICINE

## 2022-11-05 PROCEDURE — 3074F SYST BP LT 130 MM HG: CPT | Performed by: FAMILY MEDICINE

## 2022-11-05 PROCEDURE — 3008F BODY MASS INDEX DOCD: CPT | Performed by: FAMILY MEDICINE

## 2022-11-05 PROCEDURE — 3078F DIAST BP <80 MM HG: CPT | Performed by: FAMILY MEDICINE

## 2022-11-05 RX ORDER — HYDROCHLOROTHIAZIDE 12.5 MG/1
12.5 TABLET ORAL DAILY
Qty: 90 TABLET | Refills: 1 | Status: SHIPPED | OUTPATIENT
Start: 2022-11-05

## 2022-11-05 RX ORDER — LOSARTAN POTASSIUM 50 MG/1
50 TABLET ORAL DAILY
Qty: 90 TABLET | Refills: 1 | Status: SHIPPED | OUTPATIENT
Start: 2022-11-05

## 2022-11-05 NOTE — PATIENT INSTRUCTIONS
Medication reviewed and renewed where needed and appropriate. Comply with medications. Monitor blood pressures and record at home. Limit salt intake. Recommend weight loss via daily exercising and consistent healthy dietary changes. Patient here for medical clearance for upcoming left foot surgery. All preoperative testing have been entered into the chart.

## 2022-11-07 DIAGNOSIS — R82.90 ABNORMAL FINDING ON URINALYSIS: Primary | ICD-10-CM

## 2022-11-08 ENCOUNTER — LAB ENCOUNTER (OUTPATIENT)
Dept: LAB | Age: 57
End: 2022-11-08
Attending: FAMILY MEDICINE
Payer: COMMERCIAL

## 2022-11-08 ENCOUNTER — HOSPITAL ENCOUNTER (OUTPATIENT)
Dept: CV DIAGNOSTICS | Facility: HOSPITAL | Age: 57
Discharge: HOME | End: 2022-11-08
Attending: FAMILY MEDICINE
Payer: COMMERCIAL

## 2022-11-08 DIAGNOSIS — I10 ESSENTIAL HYPERTENSION: ICD-10-CM

## 2022-11-08 DIAGNOSIS — I11.9 HYPERTENSIVE LEFT VENTRICULAR HYPERTROPHY, WITHOUT HEART FAILURE: ICD-10-CM

## 2022-11-08 DIAGNOSIS — Z01.818 PRE-OPERATIVE CLEARANCE: ICD-10-CM

## 2022-11-08 DIAGNOSIS — R82.90 ABNORMAL FINDING ON URINALYSIS: ICD-10-CM

## 2022-11-08 LAB
BILIRUB UR QL: NEGATIVE
COLOR UR: YELLOW
GLUCOSE UR-MCNC: NEGATIVE MG/DL
KETONES UR-MCNC: NEGATIVE MG/DL
NITRITE UR QL STRIP.AUTO: NEGATIVE
PH UR: 6 [PH] (ref 5–8)
PROT UR-MCNC: NEGATIVE MG/DL
RBC #/AREA URNS AUTO: >10 /HPF
SP GR UR STRIP: 1.02 (ref 1–1.03)
UROBILINOGEN UR STRIP-ACNC: <2
VIT C UR-MCNC: NEGATIVE MG/DL

## 2022-11-08 PROCEDURE — 87086 URINE CULTURE/COLONY COUNT: CPT

## 2022-11-08 PROCEDURE — 87147 CULTURE TYPE IMMUNOLOGIC: CPT

## 2022-11-08 PROCEDURE — 93306 TTE W/DOPPLER COMPLETE: CPT | Performed by: FAMILY MEDICINE

## 2022-11-08 PROCEDURE — 81001 URINALYSIS AUTO W/SCOPE: CPT

## 2022-11-09 DIAGNOSIS — N39.0 URINARY TRACT INFECTION WITHOUT HEMATURIA, SITE UNSPECIFIED: Primary | ICD-10-CM

## 2022-11-09 RX ORDER — AZITHROMYCIN 250 MG/1
TABLET, FILM COATED ORAL
Qty: 6 TABLET | Refills: 0 | Status: SHIPPED | OUTPATIENT
Start: 2022-11-09 | End: 2022-11-14

## 2022-11-13 ENCOUNTER — LAB ENCOUNTER (OUTPATIENT)
Dept: LAB | Facility: HOSPITAL | Age: 57
End: 2022-11-13
Attending: FAMILY MEDICINE
Payer: COMMERCIAL

## 2022-11-13 DIAGNOSIS — Z01.818 PRE-OPERATIVE CLEARANCE: ICD-10-CM

## 2022-11-13 LAB — SARS-COV-2 RNA RESP QL NAA+PROBE: NOT DETECTED

## 2022-11-14 ENCOUNTER — TELEPHONE (OUTPATIENT)
Dept: FAMILY MEDICINE CLINIC | Facility: CLINIC | Age: 57
End: 2022-11-14

## 2022-11-14 ENCOUNTER — TELEPHONE (OUTPATIENT)
Dept: PODIATRY CLINIC | Facility: CLINIC | Age: 57
End: 2022-11-14

## 2022-11-14 DIAGNOSIS — M89.8X7 EXOSTOSIS OF LEFT FOOT: Primary | ICD-10-CM

## 2022-11-14 DIAGNOSIS — M19.072 PRIMARY LOCALIZED OSTEOARTHROSIS, ANKLE AND FOOT, LEFT: ICD-10-CM

## 2022-11-14 DIAGNOSIS — R82.90 ABNORMAL URINALYSIS: Primary | ICD-10-CM

## 2022-11-14 NOTE — TELEPHONE ENCOUNTER
Received notice from [address] that patient's surgery, scheduled for 11/16/22, needs to be moved to the hospital due to her BMI. Patient scheduled for surgery at [facility] on 11/18/22 at 10:30 a.m.

## 2022-11-14 NOTE — TELEPHONE ENCOUNTER
Krystle Dalal from [facility] office on the phone will like to receive a completed Note for clearance for the pt's surgery this Friday 11/18.  Please advise    Fax: [phone]

## 2022-11-15 ENCOUNTER — LAB ENCOUNTER (OUTPATIENT)
Dept: LAB | Facility: HOSPITAL | Age: 57
End: 2022-11-15
Attending: PODIATRIST
Payer: COMMERCIAL

## 2022-11-15 DIAGNOSIS — M19.072 PRIMARY LOCALIZED OSTEOARTHROSIS, ANKLE AND FOOT, LEFT: ICD-10-CM

## 2022-11-15 DIAGNOSIS — M89.8X7 EXOSTOSIS OF LEFT FOOT: ICD-10-CM

## 2022-11-15 NOTE — TELEPHONE ENCOUNTER
You have sent an unsigned encounter. The doctor did not finish this note specifically clearing patient for surgery nor has he signed it. Please have the doctor complete. There is no need to fax it as we can view it in [address]. Thank you.

## 2022-11-16 ENCOUNTER — LAB ENCOUNTER (OUTPATIENT)
Dept: LAB | Facility: HOSPITAL | Age: 57
End: 2022-11-16
Attending: FAMILY MEDICINE
Payer: COMMERCIAL

## 2022-11-16 DIAGNOSIS — R82.90 ABNORMAL URINALYSIS: ICD-10-CM

## 2022-11-16 LAB
BILIRUB UR QL: NEGATIVE
CLARITY UR: CLEAR
COLOR UR: YELLOW
GLUCOSE UR-MCNC: NEGATIVE MG/DL
HGB UR QL STRIP.AUTO: NEGATIVE
KETONES UR-MCNC: NEGATIVE MG/DL
NITRITE UR QL STRIP.AUTO: NEGATIVE
PH UR: 6 [PH] (ref 5–8)
PROT UR-MCNC: NEGATIVE MG/DL
SARS-COV-2 RNA RESP QL NAA+PROBE: NOT DETECTED
SP GR UR STRIP: 1.03 (ref 1–1.03)
UROBILINOGEN UR STRIP-ACNC: <2
VIT C UR-MCNC: NEGATIVE MG/DL

## 2022-11-16 PROCEDURE — 81001 URINALYSIS AUTO W/SCOPE: CPT

## 2022-11-16 PROCEDURE — 87086 URINE CULTURE/COLONY COUNT: CPT

## 2022-11-16 NOTE — TELEPHONE ENCOUNTER
Although patient was treated for abnormal urinalysis, please let her to give us a urine sample as a precaution to make sure that she has been adequately treated for urinary tract infection. This will not keep her from being medically cleared as she has already been medically cleared for foot surgery. Thank you. Orders are on the chart.

## 2022-11-16 NOTE — TELEPHONE ENCOUNTER
Pt contacted. Verified name and . Pt informed of urine test orders. Pt verbalized understanding and will go to [facility] today after work.

## 2022-11-18 ENCOUNTER — TELEPHONE (OUTPATIENT)
Dept: PODIATRY CLINIC | Facility: CLINIC | Age: 57
End: 2022-11-18

## 2022-11-18 ENCOUNTER — ANESTHESIA (OUTPATIENT)
Dept: SURGERY | Facility: HOSPITAL | Age: 57
End: 2022-11-18
Payer: COMMERCIAL

## 2022-11-18 ENCOUNTER — ANESTHESIA EVENT (OUTPATIENT)
Dept: SURGERY | Facility: HOSPITAL | Age: 57
End: 2022-11-18
Payer: COMMERCIAL

## 2022-11-18 ENCOUNTER — HOSPITAL ENCOUNTER (OUTPATIENT)
Facility: HOSPITAL | Age: 57
Setting detail: HOSPITAL OUTPATIENT SURGERY
Discharge: HOME | End: 2022-11-18
Attending: PODIATRIST | Admitting: PODIATRIST
Payer: COMMERCIAL

## 2022-11-18 ENCOUNTER — APPOINTMENT (OUTPATIENT)
Dept: GENERAL RADIOLOGY | Facility: HOSPITAL | Age: 57
End: 2022-11-18
Attending: PODIATRIST
Payer: COMMERCIAL

## 2022-11-18 VITALS
WEIGHT: 293 LBS | SYSTOLIC BLOOD PRESSURE: 121 MMHG | RESPIRATION RATE: 16 BRPM | TEMPERATURE: 98 F | HEART RATE: 50 BPM | OXYGEN SATURATION: 100 % | DIASTOLIC BLOOD PRESSURE: 55 MMHG | HEIGHT: 65 IN | BODY MASS INDEX: 48.82 KG/M2

## 2022-11-18 DIAGNOSIS — Z98.890 STATUS POST FOOT SURGERY: Primary | ICD-10-CM

## 2022-11-18 DIAGNOSIS — M19.072 PRIMARY LOCALIZED OSTEOARTHROSIS, ANKLE AND FOOT, LEFT: ICD-10-CM

## 2022-11-18 DIAGNOSIS — M89.8X7 EXOSTOSIS OF LEFT FOOT: ICD-10-CM

## 2022-11-18 PROCEDURE — 28122 PARTIAL REMOVAL OF FOOT BONE: CPT | Performed by: PODIATRIST

## 2022-11-18 PROCEDURE — 76000 FLUOROSCOPY <1 HR PHYS/QHP: CPT | Performed by: PODIATRIST

## 2022-11-18 PROCEDURE — 0QBP0ZZ EXCISION OF LEFT METATARSAL, OPEN APPROACH: ICD-10-PCS | Performed by: PODIATRIST

## 2022-11-18 RX ORDER — SODIUM CHLORIDE, SODIUM LACTATE, POTASSIUM CHLORIDE, CALCIUM CHLORIDE 600; 310; 30; 20 MG/100ML; MG/100ML; MG/100ML; MG/100ML
INJECTION, SOLUTION INTRAVENOUS CONTINUOUS
Status: DISCONTINUED | OUTPATIENT
Start: 2022-11-18 | End: 2022-11-18

## 2022-11-18 RX ORDER — FAMOTIDINE 20 MG/1
20 TABLET, FILM COATED ORAL ONCE
Status: COMPLETED | OUTPATIENT
Start: 2022-11-18 | End: 2022-11-18

## 2022-11-18 RX ORDER — MORPHINE SULFATE 4 MG/ML
4 INJECTION, SOLUTION INTRAMUSCULAR; INTRAVENOUS EVERY 10 MIN PRN
Status: DISCONTINUED | OUTPATIENT
Start: 2022-11-18 | End: 2022-11-18

## 2022-11-18 RX ORDER — LABETALOL HYDROCHLORIDE 5 MG/ML
5 INJECTION, SOLUTION INTRAVENOUS EVERY 5 MIN PRN
Status: DISCONTINUED | OUTPATIENT
Start: 2022-11-18 | End: 2022-11-18

## 2022-11-18 RX ORDER — ONDANSETRON 2 MG/ML
4 INJECTION INTRAMUSCULAR; INTRAVENOUS EVERY 6 HOURS PRN
Status: DISCONTINUED | OUTPATIENT
Start: 2022-11-18 | End: 2022-11-18

## 2022-11-18 RX ORDER — ACETAMINOPHEN 500 MG
1000 TABLET ORAL ONCE
Status: COMPLETED | OUTPATIENT
Start: 2022-11-18 | End: 2022-11-18

## 2022-11-18 RX ORDER — HYDROMORPHONE HYDROCHLORIDE 1 MG/ML
0.6 INJECTION, SOLUTION INTRAMUSCULAR; INTRAVENOUS; SUBCUTANEOUS EVERY 5 MIN PRN
Status: DISCONTINUED | OUTPATIENT
Start: 2022-11-18 | End: 2022-11-18

## 2022-11-18 RX ORDER — NALOXONE HYDROCHLORIDE 0.4 MG/ML
80 INJECTION, SOLUTION INTRAMUSCULAR; INTRAVENOUS; SUBCUTANEOUS AS NEEDED
Status: DISCONTINUED | OUTPATIENT
Start: 2022-11-18 | End: 2022-11-18

## 2022-11-18 RX ORDER — HYDROCODONE BITARTRATE AND ACETAMINOPHEN 5; 325 MG/1; MG/1
1 TABLET ORAL EVERY 6 HOURS PRN
Qty: 30 TABLET | Refills: 0 | Status: SHIPPED | OUTPATIENT
Start: 2022-11-18

## 2022-11-18 RX ORDER — DEXAMETHASONE SODIUM PHOSPHATE 10 MG/ML
INJECTION, SOLUTION INTRAMUSCULAR; INTRAVENOUS AS NEEDED
Status: DISCONTINUED | OUTPATIENT
Start: 2022-11-18 | End: 2022-11-18 | Stop reason: HOSPADM

## 2022-11-18 RX ORDER — HYDROMORPHONE HYDROCHLORIDE 1 MG/ML
0.2 INJECTION, SOLUTION INTRAMUSCULAR; INTRAVENOUS; SUBCUTANEOUS EVERY 5 MIN PRN
Status: DISCONTINUED | OUTPATIENT
Start: 2022-11-18 | End: 2022-11-18

## 2022-11-18 RX ORDER — MORPHINE SULFATE 4 MG/ML
2 INJECTION, SOLUTION INTRAMUSCULAR; INTRAVENOUS EVERY 10 MIN PRN
Status: DISCONTINUED | OUTPATIENT
Start: 2022-11-18 | End: 2022-11-18

## 2022-11-18 RX ORDER — AMOXICILLIN AND CLAVULANATE POTASSIUM 875; 125 MG/1; MG/1
1 TABLET, FILM COATED ORAL 2 TIMES DAILY
Qty: 14 TABLET | Refills: 0 | Status: SHIPPED | OUTPATIENT
Start: 2022-11-18

## 2022-11-18 RX ORDER — HYDROCODONE BITARTRATE AND ACETAMINOPHEN 5; 325 MG/1; MG/1
1 TABLET ORAL EVERY 6 HOURS PRN
Qty: 30 TABLET | Refills: 0 | Status: CANCELLED | OUTPATIENT
Start: 2022-11-18

## 2022-11-18 RX ORDER — MIDAZOLAM HYDROCHLORIDE 1 MG/ML
INJECTION INTRAMUSCULAR; INTRAVENOUS AS NEEDED
Status: DISCONTINUED | OUTPATIENT
Start: 2022-11-18 | End: 2022-11-18 | Stop reason: SURG

## 2022-11-18 RX ORDER — HYDROMORPHONE HYDROCHLORIDE 1 MG/ML
0.4 INJECTION, SOLUTION INTRAMUSCULAR; INTRAVENOUS; SUBCUTANEOUS EVERY 5 MIN PRN
Status: DISCONTINUED | OUTPATIENT
Start: 2022-11-18 | End: 2022-11-18

## 2022-11-18 RX ORDER — MORPHINE SULFATE 10 MG/ML
6 INJECTION, SOLUTION INTRAMUSCULAR; INTRAVENOUS EVERY 10 MIN PRN
Status: DISCONTINUED | OUTPATIENT
Start: 2022-11-18 | End: 2022-11-18

## 2022-11-18 RX ORDER — PROCHLORPERAZINE EDISYLATE 5 MG/ML
5 INJECTION INTRAMUSCULAR; INTRAVENOUS EVERY 8 HOURS PRN
Status: DISCONTINUED | OUTPATIENT
Start: 2022-11-18 | End: 2022-11-18

## 2022-11-18 RX ORDER — AMOXICILLIN AND CLAVULANATE POTASSIUM 875; 125 MG/1; MG/1
1 TABLET, FILM COATED ORAL 2 TIMES DAILY
Qty: 14 TABLET | Refills: 0 | Status: CANCELLED | OUTPATIENT
Start: 2022-11-18

## 2022-11-18 RX ORDER — CEFAZOLIN SODIUM IN 0.9 % NACL 3 G/100 ML
3 INTRAVENOUS SOLUTION, PIGGYBACK (ML) INTRAVENOUS ONCE
Status: DISCONTINUED | OUTPATIENT
Start: 2022-11-18 | End: 2022-11-18 | Stop reason: HOSPADM

## 2022-11-18 RX ORDER — BUPIVACAINE HYDROCHLORIDE 5 MG/ML
INJECTION, SOLUTION EPIDURAL; INTRACAUDAL AS NEEDED
Status: DISCONTINUED | OUTPATIENT
Start: 2022-11-18 | End: 2022-11-18 | Stop reason: HOSPADM

## 2022-11-18 RX ORDER — LIDOCAINE HYDROCHLORIDE 10 MG/ML
INJECTION, SOLUTION EPIDURAL; INFILTRATION; INTRACAUDAL; PERINEURAL AS NEEDED
Status: DISCONTINUED | OUTPATIENT
Start: 2022-11-18 | End: 2022-11-18 | Stop reason: SURG

## 2022-11-18 RX ORDER — METOCLOPRAMIDE 10 MG/1
10 TABLET ORAL ONCE
Status: COMPLETED | OUTPATIENT
Start: 2022-11-18 | End: 2022-11-18

## 2022-11-18 RX ADMIN — MIDAZOLAM HYDROCHLORIDE 2 MG: 1 INJECTION INTRAMUSCULAR; INTRAVENOUS at 11:03:00

## 2022-11-18 RX ADMIN — SODIUM CHLORIDE, SODIUM LACTATE, POTASSIUM CHLORIDE, CALCIUM CHLORIDE: 600; 310; 30; 20 INJECTION, SOLUTION INTRAVENOUS at 11:45:00

## 2022-11-18 RX ADMIN — LIDOCAINE HYDROCHLORIDE 50 MG: 10 INJECTION, SOLUTION EPIDURAL; INFILTRATION; INTRACAUDAL; PERINEURAL at 11:03:00

## 2022-11-18 RX ADMIN — SODIUM CHLORIDE, SODIUM LACTATE, POTASSIUM CHLORIDE, CALCIUM CHLORIDE: 600; 310; 30; 20 INJECTION, SOLUTION INTRAVENOUS at 10:57:00

## 2022-11-18 NOTE — OPERATIVE REPORT
[facility]     OPERATIVE REPORT    Luanne JIEMNEZ [id number] MRN [id number]    5/15/1965 Age 65year old   Admission Date 2022 Operation Date 2022   Attending Physician Kurtis Dc DPM Operating Physician Shama Cerna DPM   PCP Indira Fong DO             PREOPERATIVE DIAGNOSIS:   Dorsal exostosis possible osteochondroma left midfoot  POSTOPERATIVE DIAGNOSIS:   Same  PROCEDURE:   Dorsal exostectomy dorsal left midfoot possible osteochondroma     ANESTHESIA:  Local with light sedation, utilizing 0.5% Marcaine plain. 20 cc via ankle block left foot     HEMOSTASIS:   Pneumatic ankle tourniquet inflated 250 millimetersofmercury following exsanguination Nabil's bandage left lower extremity electrocautery direct pressure     ESTIMATED BLOOD LOSS:   10 cc     INDICATIONS:  This 65year old female presented with this patient has suffered with a large dorsal bump on her left foot this is interfering with proper running shoes. It is painful adjustments in shoe gear as well as padding     FINDINGS:   There is a large exostosis this is in the vicinity of the second and third metatarsal cuneiform joints. This could very well be an osteochondroma and a very shiny cartilaginous cap to it. SPECIMENS:   Exostosis from the left midfoot for osteochondroma     COMPLICATIONS:  None. DRAINS:   None     OPERATIVE TECHNIQUE:      The patient was brought into the operating room with vital signs stable and placed in the supine position on the operating table. With all personnel present all equipment was in the room checked and function appropriate timeout was taken there were no additions deletions or concerns reported. Patient was identified by wristband was date of birth and name. Diagnosis    Intravenous sedation was administered utilizing 20 cc of 0.5% Marcaine plain plain the left foot and ankle was anesthetized.   The left foot and ankle was then prepped and draped using usual aseptic technique. Prior to hemostasis the pulse was palpated and marked. There is medial to where the exostosis was. Hemostasis was achieved as above. A 5 cm linear incision was made centered directly over but slightly lateral to the midline of the exostosis. The incision was deepened using both sharp and blunt technique. Superficial veins were ligated cauterized or retracted as needed. Deep vital structures were identified underscored Safely retracted. Blunt dissection was used to make sure that the neurovascular bundle was not in the immediate incision area. It was found to be off to the medial side. A capsular periosteal incision was then made a full length of the skin incision and the capsular periosteal tissues were denuded from the prominent exostosis on all sides and dorsally. Utilizing a curved osteotome the eminence was resected in total.  Any remaining bony spurring areas around the the area was found to be flush fluoroscopy was used to visualize correction was found to be good x-rays were saved and sent to radiology. The areas then flushed with copious amounts of saline pneumatic ankle tourniquet was released any venous bleeding was treated with electrocautery. Direct pressure was also used. Once hemostasis was achieved the capsule and periosteal tissues were reapproximated and maintained using 2-0 Vicryl suture. The skin edges were reapproximated and maintained utilizing 3-0 nylon suture in a sterile postoperative dressing was applied after 1 cc of dexamethasone totaling 10 mg was injected into the area. Xeroform sterile gauze 4 inch Andrez followed by an Ace wrap was applied the patient tolerated the above anesthesia procedure well left the operating room with vital signs stable and the vascular status of her left foot intact to recovery room via cart.     Anya Weber DPM

## 2022-11-18 NOTE — TELEPHONE ENCOUNTER
Patient underwent elective foot surgery today's date at [facility].

## 2022-11-19 ENCOUNTER — TELEPHONE (OUTPATIENT)
Dept: PODIATRY CLINIC | Facility: CLINIC | Age: 57
End: 2022-11-19

## 2022-11-19 NOTE — TELEPHONE ENCOUNTER
Procedure date: 11/18/22  How are you feeling? Feeling fine  Any bleeding? Yes  Is the dressing dry & intact? Yes  Level of pain? 1/10  Alleviating factors: elevating and icing  Are you taking the prescribed medication? Yes  Are you following all of the PO instructions? Yes    Other Comments: pt states she is doing very well. She is not taking anything for pain because she has almost no pain. Follow-up appt  date: 11/22/22    Pt was advised if they have any concerns after hours to call our office and they would be directed to on call physician.

## 2022-11-22 ENCOUNTER — OFFICE VISIT (OUTPATIENT)
Dept: PODIATRY CLINIC | Facility: CLINIC | Age: 57
End: 2022-11-22
Payer: COMMERCIAL

## 2022-11-22 DIAGNOSIS — Z98.890 STATUS POST FOOT SURGERY: Primary | ICD-10-CM

## 2022-11-22 PROCEDURE — 99024 POSTOP FOLLOW-UP VISIT: CPT | Performed by: PODIATRIST

## 2022-11-29 ENCOUNTER — OFFICE VISIT (OUTPATIENT)
Dept: PODIATRY CLINIC | Facility: CLINIC | Age: 57
End: 2022-11-29
Payer: COMMERCIAL

## 2022-11-29 DIAGNOSIS — Z98.890 STATUS POST FOOT SURGERY: Primary | ICD-10-CM

## 2022-11-29 PROCEDURE — 99024 POSTOP FOLLOW-UP VISIT: CPT | Performed by: PODIATRIST

## 2022-12-05 ENCOUNTER — OFFICE VISIT (OUTPATIENT)
Dept: PODIATRY CLINIC | Facility: CLINIC | Age: 57
End: 2022-12-05
Payer: COMMERCIAL

## 2022-12-05 ENCOUNTER — OFFICE VISIT (OUTPATIENT)
Dept: FAMILY MEDICINE CLINIC | Facility: CLINIC | Age: 57
End: 2022-12-05
Payer: COMMERCIAL

## 2022-12-05 ENCOUNTER — NURSE TRIAGE (OUTPATIENT)
Dept: FAMILY MEDICINE CLINIC | Facility: CLINIC | Age: 57
End: 2022-12-05

## 2022-12-05 VITALS
TEMPERATURE: 98 F | DIASTOLIC BLOOD PRESSURE: 62 MMHG | HEIGHT: 65 IN | HEART RATE: 60 BPM | SYSTOLIC BLOOD PRESSURE: 99 MMHG | WEIGHT: 293 LBS | BODY MASS INDEX: 48.82 KG/M2

## 2022-12-05 DIAGNOSIS — Z98.890 STATUS POST FOOT SURGERY: Primary | ICD-10-CM

## 2022-12-05 DIAGNOSIS — R93.1 ABNORMAL ECHOCARDIOGRAM: ICD-10-CM

## 2022-12-05 DIAGNOSIS — B37.31 YEAST VAGINITIS: ICD-10-CM

## 2022-12-05 DIAGNOSIS — N36.8 IRRITATION OF URETHRAL MEATUS: ICD-10-CM

## 2022-12-05 DIAGNOSIS — G47.33 OSA (OBSTRUCTIVE SLEEP APNEA): ICD-10-CM

## 2022-12-05 DIAGNOSIS — R30.0 DYSURIA: ICD-10-CM

## 2022-12-05 DIAGNOSIS — R30.0 BURNING WITH URINATION: Primary | ICD-10-CM

## 2022-12-05 DIAGNOSIS — I11.9 LVH (LEFT VENTRICULAR HYPERTROPHY) DUE TO HYPERTENSIVE DISEASE, WITHOUT HEART FAILURE: ICD-10-CM

## 2022-12-05 LAB
APPEARANCE: CLEAR
BILIRUBIN: NEGATIVE
GLUCOSE (URINE DIPSTICK): NEGATIVE MG/DL
KETONES (URINE DIPSTICK): NEGATIVE MG/DL
LEUKOCYTES: NEGATIVE
MULTISTIX LOT#: ABNORMAL NUMERIC
NITRITE, URINE: NEGATIVE
PH, URINE: 6 (ref 4.5–8)
PROTEIN (URINE DIPSTICK): NEGATIVE MG/DL
SPECIFIC GRAVITY: 1.03 (ref 1–1.03)
URINE-COLOR: YELLOW
UROBILINOGEN,SEMI-QN: 0.2 MG/DL (ref 0–1.9)

## 2022-12-05 PROCEDURE — 81003 URINALYSIS AUTO W/O SCOPE: CPT | Performed by: FAMILY MEDICINE

## 2022-12-05 PROCEDURE — 3078F DIAST BP <80 MM HG: CPT | Performed by: FAMILY MEDICINE

## 2022-12-05 PROCEDURE — 99214 OFFICE O/P EST MOD 30 MIN: CPT | Performed by: FAMILY MEDICINE

## 2022-12-05 PROCEDURE — 99024 POSTOP FOLLOW-UP VISIT: CPT | Performed by: PODIATRIST

## 2022-12-05 PROCEDURE — 81002 URINALYSIS NONAUTO W/O SCOPE: CPT | Performed by: FAMILY MEDICINE

## 2022-12-05 PROCEDURE — 3008F BODY MASS INDEX DOCD: CPT | Performed by: FAMILY MEDICINE

## 2022-12-05 PROCEDURE — 87086 URINE CULTURE/COLONY COUNT: CPT | Performed by: FAMILY MEDICINE

## 2022-12-05 PROCEDURE — 3074F SYST BP LT 130 MM HG: CPT | Performed by: FAMILY MEDICINE

## 2022-12-05 RX ORDER — FLUCONAZOLE 150 MG/1
150 TABLET ORAL ONCE
Qty: 1 TABLET | Refills: 0 | Status: SHIPPED | OUTPATIENT
Start: 2022-12-05 | End: 2022-12-05

## 2022-12-05 RX ORDER — FLUCONAZOLE 150 MG/1
150 TABLET ORAL ONCE
Qty: 1 TABLET | Refills: 0 | OUTPATIENT
Start: 2022-12-05 | End: 2022-12-05

## 2022-12-05 NOTE — PATIENT INSTRUCTIONS
Increase clear water intake along with choice beverage per meal.  Diflucan 150 mg tablet to be prescribed and lieu of what may be antibiotic induced candidal vaginitis. Urine culture pending. Urinalysis does not suggest classic UTI.

## 2022-12-05 NOTE — TELEPHONE ENCOUNTER
Duplicate request, previously addressed. Past 14 days   Signed Today (12/5/2022):   fluconazole (DIFLUCAN) 150 MG Oral Tab   Sig: Take 1 tablet (150 mg total) by mouth once for 1 dose.    Disp:  1 tablet    Refills:  0   End date: 12/5/2022   Signed by: Katya Waite DO   Encounter Details

## 2022-12-05 NOTE — TELEPHONE ENCOUNTER
Duplicate request, previously addressed. Past 14 days   Signed Today (12/5/2022):   fluconazole (DIFLUCAN) 150 MG Oral Tab   Sig: Take 1 tablet (150 mg total) by mouth once for 1 dose.    Disp:  1 tablet    Refills:  0   End date: 12/5/2022   Signed by: Chaka Ascencio DO   Encounter Details

## 2022-12-06 LAB
BILIRUB UR QL: NEGATIVE
CLARITY UR: CLEAR
COLOR UR: YELLOW
GLUCOSE UR-MCNC: NEGATIVE MG/DL
HGB UR QL STRIP.AUTO: NEGATIVE
KETONES UR-MCNC: NEGATIVE MG/DL
LEUKOCYTE ESTERASE UR QL STRIP.AUTO: NEGATIVE
NITRITE UR QL STRIP.AUTO: NEGATIVE
PH UR: 6 [PH] (ref 5–8)
PROT UR-MCNC: NEGATIVE MG/DL
SP GR UR STRIP: >1.03 (ref 1–1.03)
UROBILINOGEN UR STRIP-ACNC: <2
VIT C UR-MCNC: NEGATIVE MG/DL

## 2022-12-19 ENCOUNTER — HOSPITAL ENCOUNTER (OUTPATIENT)
Age: 57
Discharge: HOME | End: 2022-12-19
Payer: COMMERCIAL

## 2022-12-19 ENCOUNTER — OFFICE VISIT (OUTPATIENT)
Dept: PODIATRY CLINIC | Facility: CLINIC | Age: 57
End: 2022-12-19
Payer: COMMERCIAL

## 2022-12-19 VITALS
TEMPERATURE: 99 F | RESPIRATION RATE: 20 BRPM | OXYGEN SATURATION: 99 % | DIASTOLIC BLOOD PRESSURE: 78 MMHG | SYSTOLIC BLOOD PRESSURE: 132 MMHG | HEART RATE: 82 BPM

## 2022-12-19 DIAGNOSIS — Z98.890 STATUS POST FOOT SURGERY: Primary | ICD-10-CM

## 2022-12-19 DIAGNOSIS — R50.9 FEVER, UNSPECIFIED FEVER CAUSE: Primary | ICD-10-CM

## 2022-12-19 DIAGNOSIS — U07.1 COVID-19: ICD-10-CM

## 2022-12-19 LAB
POCT INFLUENZA A: NEGATIVE
POCT INFLUENZA B: NEGATIVE
SARS-COV-2 RNA RESP QL NAA+PROBE: DETECTED

## 2022-12-19 PROCEDURE — 99213 OFFICE O/P EST LOW 20 MIN: CPT | Performed by: NURSE PRACTITIONER

## 2022-12-19 PROCEDURE — U0002 COVID-19 LAB TEST NON-CDC: HCPCS | Performed by: NURSE PRACTITIONER

## 2022-12-19 PROCEDURE — 87502 INFLUENZA DNA AMP PROBE: CPT | Performed by: NURSE PRACTITIONER

## 2022-12-19 PROCEDURE — 99024 POSTOP FOLLOW-UP VISIT: CPT | Performed by: PODIATRIST

## 2022-12-19 RX ORDER — NIRMATRELVIR AND RITONAVIR 300-100 MG
KIT ORAL
Qty: 30 TABLET | Refills: 0 | Status: SHIPPED | OUTPATIENT
Start: 2022-12-19 | End: 2022-12-24

## 2023-01-09 ENCOUNTER — OFFICE VISIT (OUTPATIENT)
Dept: PODIATRY CLINIC | Facility: CLINIC | Age: 58
End: 2023-01-09
Payer: COMMERCIAL

## 2023-01-09 DIAGNOSIS — Z98.890 STATUS POST FOOT SURGERY: Primary | ICD-10-CM

## 2023-01-09 PROCEDURE — 99024 POSTOP FOLLOW-UP VISIT: CPT | Performed by: PODIATRIST

## 2023-02-13 ENCOUNTER — OFFICE VISIT (OUTPATIENT)
Dept: FAMILY MEDICINE CLINIC | Facility: CLINIC | Age: 58
End: 2023-02-13
Payer: COMMERCIAL

## 2023-02-13 ENCOUNTER — OFFICE VISIT (OUTPATIENT)
Dept: PODIATRY CLINIC | Facility: CLINIC | Age: 58
End: 2023-02-13

## 2023-02-13 VITALS
SYSTOLIC BLOOD PRESSURE: 120 MMHG | TEMPERATURE: 98 F | HEART RATE: 62 BPM | DIASTOLIC BLOOD PRESSURE: 80 MMHG | WEIGHT: 293 LBS | HEIGHT: 65 IN | BODY MASS INDEX: 48.82 KG/M2

## 2023-02-13 DIAGNOSIS — I10 ESSENTIAL HYPERTENSION: ICD-10-CM

## 2023-02-13 DIAGNOSIS — E66.01 MORBID OBESITY WITH BMI OF 50.0-59.9, ADULT (HCC): ICD-10-CM

## 2023-02-13 DIAGNOSIS — H18.603 KERATOCONUS OF BOTH EYES: ICD-10-CM

## 2023-02-13 DIAGNOSIS — I11.9 LVH (LEFT VENTRICULAR HYPERTROPHY) DUE TO HYPERTENSIVE DISEASE, WITHOUT HEART FAILURE: ICD-10-CM

## 2023-02-13 DIAGNOSIS — G47.33 OSA (OBSTRUCTIVE SLEEP APNEA): ICD-10-CM

## 2023-02-13 DIAGNOSIS — Z12.31 ENCOUNTER FOR SCREENING MAMMOGRAM FOR BREAST CANCER: Primary | ICD-10-CM

## 2023-02-13 DIAGNOSIS — L81.9 HYPERPIGMENTATION: ICD-10-CM

## 2023-02-13 DIAGNOSIS — R76.11 POSITIVE TB TEST: ICD-10-CM

## 2023-02-13 DIAGNOSIS — Z98.890 STATUS POST FOOT SURGERY: Primary | ICD-10-CM

## 2023-02-13 PROCEDURE — 99024 POSTOP FOLLOW-UP VISIT: CPT | Performed by: PODIATRIST

## 2023-02-13 RX ORDER — HYDROQUINONE 40 MG/G
1 CREAM TOPICAL 2 TIMES DAILY
Qty: 30 G | Refills: 0 | Status: SHIPPED | OUTPATIENT
Start: 2023-02-13

## 2023-02-13 RX ORDER — LOSARTAN POTASSIUM 50 MG/1
50 TABLET ORAL DAILY
Qty: 90 TABLET | Refills: 1 | Status: SHIPPED | OUTPATIENT
Start: 2023-02-13

## 2023-02-13 RX ORDER — HYDROCHLOROTHIAZIDE 12.5 MG/1
12.5 TABLET ORAL DAILY
Qty: 90 TABLET | Refills: 1 | Status: SHIPPED | OUTPATIENT
Start: 2023-02-13

## 2023-02-14 ENCOUNTER — TELEPHONE (OUTPATIENT)
Dept: FAMILY MEDICINE CLINIC | Facility: CLINIC | Age: 58
End: 2023-02-14

## 2023-02-14 DIAGNOSIS — H53.9 VISION DISORDER: Primary | ICD-10-CM

## 2023-02-14 NOTE — TELEPHONE ENCOUNTER
Patient called (identified name and ),   Saw the eye doctor at [facility]. Now she needs referral to cover \"contact lens and materials. \"  Each lens cost $350.00. She will get supplies through Dr Collette Pack office at [facility]. Triage support, please assist referral for these supplies  Patient wants to be notified when done, and wants copy of referral sent to her. OK to use [employer].

## 2023-02-14 NOTE — PATIENT INSTRUCTIONS
Medication reviewed and renewed where needed and appropriate. Comply with medications. Recommend weight loss via daily exercising and consistent healthy dietary changes. Monitor blood pressures and record at home. Limit salt intake. Mammogram ordered. Referred to Ophthalmology for follow up on keratoconus. Tdap ordered. QuantiFERON gold test ordered.

## 2023-02-15 NOTE — TELEPHONE ENCOUNTER
Spoke with patient and she requested for me to fax the ophthalmology referral to [phone].  I also mailed her the referral to her home, per her request.

## 2023-02-15 NOTE — TELEPHONE ENCOUNTER
Called patient, confirmed name and . Informed. Patient verbalized understanding and agrees. She is requesting the that referral be mailed to her home.

## 2023-02-15 NOTE — TELEPHONE ENCOUNTER
Referral has been completed and signed. Please make sure that this is under her managed care. Please call all pertinent parties and let them know.

## 2023-02-18 ENCOUNTER — LAB ENCOUNTER (OUTPATIENT)
Dept: LAB | Facility: HOSPITAL | Age: 58
End: 2023-02-18
Attending: FAMILY MEDICINE
Payer: COMMERCIAL

## 2023-02-18 DIAGNOSIS — R76.11 POSITIVE TB TEST: ICD-10-CM

## 2023-02-18 PROCEDURE — 36415 COLL VENOUS BLD VENIPUNCTURE: CPT

## 2023-02-18 PROCEDURE — 86480 TB TEST CELL IMMUN MEASURE: CPT

## 2023-02-21 LAB
M TB IFN-G CD4+ T-CELLS BLD-ACNC: 0.11 IU/ML
M TB TUBERC IFN-G BLD QL: NEGATIVE
M TB TUBERC IGNF/MITOGEN IGNF CONTROL: >10 IU/ML
QFT TB1 AG MINUS NIL: 0.11 IU/ML
QFT TB2 AG MINUS NIL: -0.07 IU/ML

## 2023-05-20 ENCOUNTER — HOSPITAL ENCOUNTER (OUTPATIENT)
Dept: MAMMOGRAPHY | Age: 58
Discharge: HOME | End: 2023-05-20
Attending: FAMILY MEDICINE
Payer: COMMERCIAL

## 2023-05-20 DIAGNOSIS — Z12.31 ENCOUNTER FOR SCREENING MAMMOGRAM FOR BREAST CANCER: ICD-10-CM

## 2023-05-20 PROCEDURE — 77063 BREAST TOMOSYNTHESIS BI: CPT | Performed by: FAMILY MEDICINE

## 2023-05-20 PROCEDURE — 77067 SCR MAMMO BI INCL CAD: CPT | Performed by: FAMILY MEDICINE

## 2023-06-17 ENCOUNTER — OFFICE VISIT (OUTPATIENT)
Dept: FAMILY MEDICINE CLINIC | Facility: CLINIC | Age: 58
End: 2023-06-17

## 2023-06-17 VITALS
BODY MASS INDEX: 48.82 KG/M2 | SYSTOLIC BLOOD PRESSURE: 133 MMHG | HEIGHT: 65 IN | TEMPERATURE: 98 F | DIASTOLIC BLOOD PRESSURE: 72 MMHG | WEIGHT: 293 LBS | HEART RATE: 79 BPM

## 2023-06-17 DIAGNOSIS — H18.603 KERATOCONUS OF BOTH EYES: Primary | ICD-10-CM

## 2023-06-17 DIAGNOSIS — N76.1 SUBACUTE VAGINITIS: ICD-10-CM

## 2023-06-17 DIAGNOSIS — I10 ESSENTIAL HYPERTENSION: ICD-10-CM

## 2023-06-17 DIAGNOSIS — E66.01 MORBID OBESITY WITH BMI OF 50.0-59.9, ADULT (HCC): ICD-10-CM

## 2023-06-17 DIAGNOSIS — J45.20 MILD INTERMITTENT ASTHMA WITHOUT COMPLICATION: ICD-10-CM

## 2023-06-17 DIAGNOSIS — L81.9 HYPERPIGMENTATION: ICD-10-CM

## 2023-06-17 DIAGNOSIS — I11.9 LVH (LEFT VENTRICULAR HYPERTROPHY) DUE TO HYPERTENSIVE DISEASE, WITHOUT HEART FAILURE: ICD-10-CM

## 2023-06-17 PROCEDURE — 3008F BODY MASS INDEX DOCD: CPT | Performed by: FAMILY MEDICINE

## 2023-06-17 PROCEDURE — 99214 OFFICE O/P EST MOD 30 MIN: CPT | Performed by: FAMILY MEDICINE

## 2023-06-17 PROCEDURE — 3078F DIAST BP <80 MM HG: CPT | Performed by: FAMILY MEDICINE

## 2023-06-17 PROCEDURE — 3075F SYST BP GE 130 - 139MM HG: CPT | Performed by: FAMILY MEDICINE

## 2023-06-17 RX ORDER — LOSARTAN POTASSIUM 50 MG/1
50 TABLET ORAL DAILY
Qty: 90 TABLET | Refills: 1 | Status: SHIPPED | OUTPATIENT
Start: 2023-06-17

## 2023-06-17 RX ORDER — FLUCONAZOLE 150 MG/1
150 TABLET ORAL ONCE
Qty: 1 TABLET | Refills: 0 | Status: SHIPPED | OUTPATIENT
Start: 2023-06-17 | End: 2023-06-17

## 2023-06-17 RX ORDER — HYDROCHLOROTHIAZIDE 12.5 MG/1
12.5 TABLET ORAL DAILY
Qty: 90 TABLET | Refills: 1 | Status: SHIPPED | OUTPATIENT
Start: 2023-06-17

## 2023-06-17 NOTE — PATIENT INSTRUCTIONS
Medication reviewed and renewed where needed and appropriate. Comply with medications. Monitor blood pressures and record at home. Limit salt intake. Recommend weight loss via daily exercising and consistent healthy dietary changes. Medication reviewed and renewed where needed and appropriate.

## 2023-11-06 ENCOUNTER — OFFICE VISIT (OUTPATIENT)
Dept: FAMILY MEDICINE CLINIC | Facility: CLINIC | Age: 58
End: 2023-11-06
Payer: COMMERCIAL

## 2023-11-06 VITALS
TEMPERATURE: 97 F | DIASTOLIC BLOOD PRESSURE: 72 MMHG | BODY MASS INDEX: 48.82 KG/M2 | HEIGHT: 65 IN | HEART RATE: 87 BPM | WEIGHT: 293 LBS | SYSTOLIC BLOOD PRESSURE: 124 MMHG

## 2023-11-06 DIAGNOSIS — E66.01 MORBID OBESITY WITH BMI OF 50.0-59.9, ADULT (HCC): ICD-10-CM

## 2023-11-06 DIAGNOSIS — I11.9 LVH (LEFT VENTRICULAR HYPERTROPHY) DUE TO HYPERTENSIVE DISEASE, WITHOUT HEART FAILURE: ICD-10-CM

## 2023-11-06 DIAGNOSIS — B37.31 YEAST VAGINITIS: ICD-10-CM

## 2023-11-06 DIAGNOSIS — N39.0 URINARY TRACT INFECTION WITHOUT HEMATURIA, SITE UNSPECIFIED: ICD-10-CM

## 2023-11-06 DIAGNOSIS — H18.603 KERATOCONUS OF BOTH EYES: Primary | ICD-10-CM

## 2023-11-06 DIAGNOSIS — I10 PRIMARY HYPERTENSION: ICD-10-CM

## 2023-11-06 PROCEDURE — 3074F SYST BP LT 130 MM HG: CPT | Performed by: FAMILY MEDICINE

## 2023-11-06 PROCEDURE — 3008F BODY MASS INDEX DOCD: CPT | Performed by: FAMILY MEDICINE

## 2023-11-06 PROCEDURE — 3078F DIAST BP <80 MM HG: CPT | Performed by: FAMILY MEDICINE

## 2023-11-06 PROCEDURE — 99214 OFFICE O/P EST MOD 30 MIN: CPT | Performed by: FAMILY MEDICINE

## 2023-11-06 RX ORDER — SULFAMETHOXAZOLE AND TRIMETHOPRIM 800; 160 MG/1; MG/1
1 TABLET ORAL 2 TIMES DAILY
Qty: 14 TABLET | Refills: 0 | Status: SHIPPED | OUTPATIENT
Start: 2023-11-06 | End: 2023-11-13

## 2023-11-06 RX ORDER — FLUCONAZOLE 150 MG/1
150 TABLET ORAL ONCE
Qty: 1 TABLET | Refills: 0 | Status: SHIPPED | OUTPATIENT
Start: 2023-11-06 | End: 2023-11-06

## 2023-11-06 NOTE — PROGRESS NOTES
Subjective:     Patient ID: Gio Hope is a 63year old female. This patient is a well-established hypertensive/morbidly obese/keratoconus/reactive airway who is here to follow-up on these chronic conditions and also to obtain a referral for follow-up concerning her eye. Patient declines on flu vaccine on today. Patient denies headache, chest pain, dizziness, shortness of breath, visual changes as a pertains to hypertensive disease, as/or exertional fatigue. Patient did experience a recent urinary tract infection successfully treated. Patient requesting that UTI prescription be sent to the pharmacy to be on hold as well as post antibiotic treatment yeast tablet as she gets yeast after treatment        History/Other:   Review of Systems  Current Outpatient Medications   Medication Sig Dispense Refill    sulfamethoxazole-trimethoprim -160 MG Oral Tab per tablet Take 1 tablet by mouth 2 (two) times daily for 7 days. 14 tablet 0    fluconazole (DIFLUCAN) 150 MG Oral Tab Take 1 tablet (150 mg total) by mouth once for 1 dose. 1 tablet 0    hydroCHLOROthiazide 12.5 MG Oral Tab Take 1 tablet (12.5 mg total) by mouth daily. 90 tablet 1    losartan 50 MG Oral Tab Take 1 tablet (50 mg total) by mouth daily. 90 tablet 1    Hydroquinone 4 % External Cream Apply 1 Application. topically 2 (two) times daily. 30 g 0    HYDROcodone-acetaminophen 5-325 MG Oral Tab Take 1 tablet by mouth every 6 (six) hours as needed for Pain. 30 tablet 0    levocetirizine 5 MG Oral Tab Take 1 tablet (5 mg total) by mouth every evening. 90 tablet 1    Fexofenadine-Pseudoephed ER  MG Oral Tablet 12 Hr Take 1 tablet by mouth 2 (two) times daily. (Patient taking differently: Take 1 tablet by mouth 2 (two) times daily as needed.) 30 tablet 0    Spacer/Aero Chamber Mouthpiece Does not apply Misc To be used with inhaler as needed. 1 each 0    Biotin 44100 MCG Oral Tab Take 1 tablet by mouth daily.        Allergies:No Known Allergies    Past Medical History:   Diagnosis Date    Abnormal uterine bleeding 2013    IUD    Amenorrhea 2013    IUD    Asthma     Astigmatism 2012    high     Astigmatism, irregular     OU    High blood pressure     High myopia 2012    History of pregnancy     EAB x2 1985, 1997 no comp    Hypertension 04/2018    Losartian    Infertility, female 2008    Iritis 2012    9/20/12    Keratoconus 2018 2018- referred to Dr. Jass Clancy screening 10/12/2013    RUPAL (obstructive sleep apnea) 10/13/19 HST    AHI 11.2 Supine AHI 14 non-supine AHI 8.5 Sao2 Vishnu 81%    Presbyopia 2012    Sleep apnea     Tuberculosis contact 1984    took [facility] medication      Past Surgical History:   Procedure Laterality Date    CATARACT EXTRACTION W/  INTRAOCULAR LENS IMPLANT Right 8/31/15    RJM with Vision Blue and Shugarcaine    CATARACT EXTRACTION W/  INTRAOCULAR LENS IMPLANT Left 9/14/15    RJM with Vision Blue and Shugarcaine    COLON SURGERY  2016    routine colonoscopy    COLONOSCOPY N/A 1/13/2017    Procedure: COLONOSCOPY;  Surgeon: Shannon Leigh MD;  Location: [address] [place] [address] ENDOSCOPY    COLONOSCOPY N/A 6/24/2022    Procedure: COLONOSCOPY;  Surgeon: Shannon Leigh MD;  Location: [address] [place] [address] ENDOSCOPY    D & C      D & C  2013    EYE SURGERY Right 01/2021    CXL with Dr. Minerva Hollis     YAG CAPSULOTOMY - OD - RIGHT EYE Right 5/17/17    RJ    YAG CAPSULOTOMY - OS - LEFT EYE Left 5/31/17    RJ      Family History   Problem Relation Age of Onset    Heart Disease Father     Seizure Disorder Mother     Cataracts Maternal Grandmother     Diabetes Neg     Glaucoma Neg     Macular degeneration Neg       Social History:   Social History     Socioeconomic History    Marital status:    Tobacco Use    Smoking status: Never    Smokeless tobacco: Never   Vaping Use    Vaping Use: Never used   Substance and Sexual Activity    Alcohol use: No    Drug use: No    Sexual activity: Yes     Partners: Male   Other Topics Concern Caffeine Concern Yes     Comment: coffee 1 cup        Objective:    11/06/23  1508   BP: 124/72   Pulse:    Temp:        Physical Exam  Constitutional:       Appearance: She is obese. HENT:      Head: Normocephalic and atraumatic. Nose: Nose normal.      Mouth/Throat:      Mouth: Mucous membranes are moist.   Neck:      Thyroid: No thyromegaly. Cardiovascular:      Rate and Rhythm: Normal rate and regular rhythm. Pulmonary:      Effort: Pulmonary effort is normal. No respiratory distress. Breath sounds: Normal breath sounds. Psychiatric:         Mood and Affect: Mood normal.         Assessment & Plan:   1. Primary hypertension  To goal.    2. Keratoconus of both eyes  Referred. - Ophthalmology Referral - External    3. LVH (left ventricular hypertrophy) due to hypertensive disease, without heart failure  Status unchanged. 4. Morbid obesity with BMI of 50.0-59.9, adult ([address]  Weight loss recommended. 5. Urinary tract infection without hematuria, site unspecified  Prescribed. - sulfamethoxazole-trimethoprim -160 MG Oral Tab per tablet; Take 1 tablet by mouth 2 (two) times daily for 7 days. Dispense: 14 tablet; Refill: 0    6. Yeast vaginitis  Refilled. - fluconazole (DIFLUCAN) 150 MG Oral Tab; Take 1 tablet (150 mg total) by mouth once for 1 dose. Dispense: 1 tablet; Refill: 0      No orders of the defined types were placed in this encounter. Meds This Visit:  Requested Prescriptions     Signed Prescriptions Disp Refills    sulfamethoxazole-trimethoprim -160 MG Oral Tab per tablet 14 tablet 0     Sig: Take 1 tablet by mouth 2 (two) times daily for 7 days. fluconazole (DIFLUCAN) 150 MG Oral Tab 1 tablet 0     Sig: Take 1 tablet (150 mg total) by mouth once for 1 dose. Imaging & Referrals:  OPHTHALMOLOGY - EXTERNAL     Patient Instructions   Medication reviewed and renewed where needed and appropriate. Comply with medications.   Recommend weight loss via daily exercising and consistent healthy dietary changes. Encouraged physical fitness and daily physical activity daily. Keep all specialty appointments. Diflucan and Bactrim DS tabs for UTI and potential yeast to follow. Return in about 3 months (around 2/6/2024), or if symptoms worsen or fail to improve.

## 2023-11-06 NOTE — PATIENT INSTRUCTIONS
Medication reviewed and renewed where needed and appropriate. Comply with medications. Recommend weight loss via daily exercising and consistent healthy dietary changes. Encouraged physical fitness and daily physical activity daily. Keep all specialty appointments. Diflucan and Bactrim DS tabs for UTI and potential yeast to follow.

## 2024-02-01 ENCOUNTER — TELEPHONE (OUTPATIENT)
Dept: FAMILY MEDICINE CLINIC | Facility: CLINIC | Age: 59
End: 2024-02-01

## 2024-02-01 DIAGNOSIS — H18.603 KERATOCONUS OF BOTH EYES: Primary | ICD-10-CM

## 2024-02-01 NOTE — TELEPHONE ENCOUNTER
Patient is requesting referral.     Name of specialist and specialty department : Dr.Charlotte Carlisle/[facility]/Opthamologist   Reason for visit with the specialist: Kerataconus of both eyes. Per the patient is code: H18.603 CPT code 11607  Address of the specialist office: [address] [place], [place] [address]  Appointment date: 2-19-24     Pt is asking for 3 visits    CSS informed patient the turnaround time for referral is 5-7 business days.  Patient was informed to check their [employer] account for referral status.

## 2024-02-02 NOTE — TELEPHONE ENCOUNTER
Dr. Maloney,    Patient called requesting referral to Dr. Carlisle.     Pended referral please review diagnosis and sign off if you agree.    Thank you.  Teresa Vazquez  [facility]

## 2024-02-03 NOTE — TELEPHONE ENCOUNTER
Called patient to inform her of referral placed. States the MD on the referral is no longer in network, advised patient to call her insurance to find out the doctor who is covered by her insurance. Pt verb understanding.

## 2024-02-05 ENCOUNTER — OFFICE VISIT (OUTPATIENT)
Dept: FAMILY MEDICINE CLINIC | Facility: CLINIC | Age: 59
End: 2024-02-05
Payer: COMMERCIAL

## 2024-02-05 ENCOUNTER — LAB ENCOUNTER (OUTPATIENT)
Dept: LAB | Age: 59
End: 2024-02-05
Attending: FAMILY MEDICINE
Payer: COMMERCIAL

## 2024-02-05 VITALS
TEMPERATURE: 98 F | OXYGEN SATURATION: 97 % | WEIGHT: 293 LBS | RESPIRATION RATE: 18 BRPM | SYSTOLIC BLOOD PRESSURE: 123 MMHG | HEART RATE: 58 BPM | DIASTOLIC BLOOD PRESSURE: 65 MMHG | BODY MASS INDEX: 48.82 KG/M2 | HEIGHT: 65 IN

## 2024-02-05 DIAGNOSIS — H18.603 KERATOCONUS OF BOTH EYES: ICD-10-CM

## 2024-02-05 DIAGNOSIS — M79.18 GLUTEAL PAIN: ICD-10-CM

## 2024-02-05 DIAGNOSIS — Z12.31 ENCOUNTER FOR SCREENING MAMMOGRAM FOR BREAST CANCER: Primary | ICD-10-CM

## 2024-02-05 DIAGNOSIS — Z00.00 ROUTINE PHYSICAL EXAMINATION: ICD-10-CM

## 2024-02-05 DIAGNOSIS — I10 ESSENTIAL HYPERTENSION: ICD-10-CM

## 2024-02-05 LAB
ALBUMIN SERPL-MCNC: 3.9 G/DL (ref 3.2–4.8)
ALBUMIN/GLOB SERPL: 1.1 {RATIO} (ref 1–2)
ALP LIVER SERPL-CCNC: 96 U/L
ALT SERPL-CCNC: 7 U/L
ANION GAP SERPL CALC-SCNC: 8 MMOL/L (ref 0–18)
AST SERPL-CCNC: 16 U/L (ref ?–34)
BASOPHILS # BLD AUTO: 0.08 X10(3) UL (ref 0–0.2)
BASOPHILS NFR BLD AUTO: 0.8 %
BILIRUB SERPL-MCNC: 0.4 MG/DL (ref 0.3–1.2)
BILIRUB UR QL: NEGATIVE
BUN BLD-MCNC: 23 MG/DL (ref 9–23)
BUN/CREAT SERPL: 25.8 (ref 10–20)
CALCIUM BLD-MCNC: 8.5 MG/DL (ref 8.7–10.4)
CHLORIDE SERPL-SCNC: 108 MMOL/L (ref 98–112)
CHOLEST SERPL-MCNC: 159 MG/DL (ref ?–200)
CLARITY UR: CLEAR
CO2 SERPL-SCNC: 26 MMOL/L (ref 21–32)
CREAT BLD-MCNC: 0.89 MG/DL
DEPRECATED RDW RBC AUTO: 49.7 FL (ref 35.1–46.3)
EGFRCR SERPLBLD CKD-EPI 2021: 76 ML/MIN/1.73M2 (ref 60–?)
EOSINOPHIL # BLD AUTO: 0.17 X10(3) UL (ref 0–0.7)
EOSINOPHIL NFR BLD AUTO: 1.8 %
ERYTHROCYTE [DISTWIDTH] IN BLOOD BY AUTOMATED COUNT: 15.4 % (ref 11–15)
FASTING PATIENT LIPID ANSWER: YES
FASTING STATUS PATIENT QL REPORTED: YES
GLOBULIN PLAS-MCNC: 3.5 G/DL (ref 2.8–4.4)
GLUCOSE BLD-MCNC: 83 MG/DL (ref 70–99)
GLUCOSE UR-MCNC: NORMAL MG/DL
HCT VFR BLD AUTO: 37.1 %
HDLC SERPL-MCNC: 61 MG/DL (ref 40–59)
HGB BLD-MCNC: 12.4 G/DL
IMM GRANULOCYTES # BLD AUTO: 0.02 X10(3) UL (ref 0–1)
IMM GRANULOCYTES NFR BLD: 0.2 %
KETONES UR-MCNC: NEGATIVE MG/DL
LDLC SERPL CALC-MCNC: 84 MG/DL (ref ?–100)
LEUKOCYTE ESTERASE UR QL STRIP.AUTO: 75
LYMPHOCYTES # BLD AUTO: 3.49 X10(3) UL (ref 1–4)
LYMPHOCYTES NFR BLD AUTO: 36.4 %
MCH RBC QN AUTO: 29.2 PG (ref 26–34)
MCHC RBC AUTO-ENTMCNC: 33.4 G/DL (ref 31–37)
MCV RBC AUTO: 87.3 FL
MONOCYTES # BLD AUTO: 0.6 X10(3) UL (ref 0.1–1)
MONOCYTES NFR BLD AUTO: 6.3 %
NEUTROPHILS # BLD AUTO: 5.24 X10 (3) UL (ref 1.5–7.7)
NEUTROPHILS # BLD AUTO: 5.24 X10(3) UL (ref 1.5–7.7)
NEUTROPHILS NFR BLD AUTO: 54.5 %
NITRITE UR QL STRIP.AUTO: NEGATIVE
NONHDLC SERPL-MCNC: 98 MG/DL (ref ?–130)
OSMOLALITY SERPL CALC.SUM OF ELEC: 297 MOSM/KG (ref 275–295)
PH UR: 5.5 [PH] (ref 5–8)
PLATELET # BLD AUTO: 242 10(3)UL (ref 150–450)
POTASSIUM SERPL-SCNC: 3.8 MMOL/L (ref 3.5–5.1)
PROT SERPL-MCNC: 7.4 G/DL (ref 5.7–8.2)
PROT UR-MCNC: NEGATIVE MG/DL
RBC # BLD AUTO: 4.25 X10(6)UL
SODIUM SERPL-SCNC: 142 MMOL/L (ref 136–145)
SP GR UR STRIP: 1.02 (ref 1–1.03)
TRIGL SERPL-MCNC: 70 MG/DL (ref 30–149)
TSI SER-ACNC: 2.69 MIU/ML (ref 0.55–4.78)
UROBILINOGEN UR STRIP-ACNC: NORMAL
VLDLC SERPL CALC-MCNC: 11 MG/DL (ref 0–30)
WBC # BLD AUTO: 9.6 X10(3) UL (ref 4–11)

## 2024-02-05 PROCEDURE — 84443 ASSAY THYROID STIM HORMONE: CPT

## 2024-02-05 PROCEDURE — 99213 OFFICE O/P EST LOW 20 MIN: CPT | Performed by: FAMILY MEDICINE

## 2024-02-05 PROCEDURE — 36415 COLL VENOUS BLD VENIPUNCTURE: CPT

## 2024-02-05 PROCEDURE — 3078F DIAST BP <80 MM HG: CPT | Performed by: FAMILY MEDICINE

## 2024-02-05 PROCEDURE — 3008F BODY MASS INDEX DOCD: CPT | Performed by: FAMILY MEDICINE

## 2024-02-05 PROCEDURE — 3074F SYST BP LT 130 MM HG: CPT | Performed by: FAMILY MEDICINE

## 2024-02-05 PROCEDURE — 81001 URINALYSIS AUTO W/SCOPE: CPT

## 2024-02-05 PROCEDURE — 99396 PREV VISIT EST AGE 40-64: CPT | Performed by: FAMILY MEDICINE

## 2024-02-05 PROCEDURE — 80053 COMPREHEN METABOLIC PANEL: CPT

## 2024-02-05 PROCEDURE — 80061 LIPID PANEL: CPT

## 2024-02-05 PROCEDURE — 85025 COMPLETE CBC W/AUTO DIFF WBC: CPT

## 2024-02-05 RX ORDER — LOSARTAN POTASSIUM 50 MG/1
50 TABLET ORAL DAILY
Qty: 90 TABLET | Refills: 1 | Status: SHIPPED | OUTPATIENT
Start: 2024-02-05

## 2024-02-05 RX ORDER — CYCLOBENZAPRINE HCL 5 MG
5 TABLET ORAL 3 TIMES DAILY PRN
Qty: 30 TABLET | Refills: 0 | Status: SHIPPED | OUTPATIENT
Start: 2024-02-05

## 2024-02-05 RX ORDER — HYDROCHLOROTHIAZIDE 12.5 MG/1
12.5 TABLET ORAL DAILY
Qty: 90 TABLET | Refills: 1 | Status: SHIPPED | OUTPATIENT
Start: 2024-02-05

## 2024-02-05 RX ORDER — METHYLPREDNISOLONE 4 MG/1
TABLET ORAL
Qty: 1 EACH | Refills: 0 | Status: SHIPPED | OUTPATIENT
Start: 2024-02-05

## 2024-02-05 NOTE — PROGRESS NOTES
Subjective:     Patient ID: Sofie Tyler is a 58 year old female.    This patient is a 58-year-old well-established hypertensive/morbidly obese -American female here for complete preventive care physical and for status update on any confirmed chronic medical illnesses and follow up on any previous labs or procedures that were suggestive or in need of further work up. Colonoscopy is current. Bowel and bladder functions are intact.    Patient also reports acute onset of focal right gluteal pain which radiates into the groin region which is incapacitating.  It is a sharp shooting pain with a component of spasm.  It is intermittent and unpredictable.  The first episode regarding this persistent complaint began at her home on a Saturday after getting up from the commode.    Patient able to eliminate normally regarding stool and urine.    No blood in stool.    There is no radiculopathy into the right lower extremity aside from the groin region.    Patient needs referral to follow-up with eye specialist regarding her keratoconus.    Patient denies headache, chest pain, dizziness, shortness of breath, visual changes, exertional fatigue.  Patient compliant with medication.  Currently patient has nothing pending in her care gaps.        History/Other:   Review of Systems  Current Outpatient Medications   Medication Sig Dispense Refill    losartan 50 MG Oral Tab Take 1 tablet (50 mg total) by mouth daily. 90 tablet 1    hydroCHLOROthiazide 12.5 MG Oral Tab Take 1 tablet (12.5 mg total) by mouth daily. 90 tablet 1    methylPREDNISolone (MEDROL) 4 MG Oral Tablet Therapy Pack As directed. 1 each 0    cyclobenzaprine 5 MG Oral Tab Take 1 tablet (5 mg total) by mouth 3 (three) times daily as needed for Muscle spasms. 30 tablet 0    levocetirizine 5 MG Oral Tab Take 1 tablet (5 mg total) by mouth every evening. 90 tablet 1    Fexofenadine-Pseudoephed ER  MG Oral Tablet 12 Hr Take 1 tablet by mouth 2 (two)  times daily. (Patient taking differently: Take 1 tablet by mouth 2 (two) times daily as needed.) 30 tablet 0    Biotin 27329 MCG Oral Tab Take 1 tablet by mouth daily.      Hydroquinone 4 % External Cream Apply 1 Application. topically 2 (two) times daily. (Patient not taking: Reported on 2/5/2024) 30 g 0    HYDROcodone-acetaminophen 5-325 MG Oral Tab Take 1 tablet by mouth every 6 (six) hours as needed for Pain. (Patient not taking: Reported on 2/5/2024) 30 tablet 0    Spacer/Aero Chamber Mouthpiece Does not apply Misc To be used with inhaler as needed. (Patient not taking: Reported on 2/5/2024) 1 each 0     Allergies:No Known Allergies    Past Medical History:   Diagnosis Date    Abnormal uterine bleeding 2013    IUD    Amenorrhea 2013    IUD    Asthma     Astigmatism 2012    high     Astigmatism, irregular     OU    High blood pressure     High myopia 2012    History of pregnancy     EAB x2 1985, 1997 no comp    Hypertension 04/2018    Losartian    Infertility, female 2008    Iritis 2012 9/20/12    Keratoconus 2018 2018- referred to Dr. Joselo Carrion     Lipid screening 10/12/2013    RUPAL (obstructive sleep apnea) 10/13/19 HST    AHI 11.2 Supine AHI 14 non-supine AHI 8.5 Sao2 Vishnu 81%    Presbyopia 2012    Sleep apnea     Tuberculosis contact 1984    took INH medication      Past Surgical History:   Procedure Laterality Date    CATARACT EXTRACTION W/  INTRAOCULAR LENS IMPLANT Right 8/31/15    RJ with Vision Blue and Shugarcaine    CATARACT EXTRACTION W/  INTRAOCULAR LENS IMPLANT Left 9/14/15    RJM with Vision Blue and Shugarcaine    COLON SURGERY  2016    routine colonoscopy    COLONOSCOPY N/A 1/13/2017    Procedure: COLONOSCOPY;  Surgeon: Domingo Guerrero MD;  Location: [facility] ENDOSCOPY    COLONOSCOPY N/A 6/24/2022    Procedure: COLONOSCOPY;  Surgeon: Domingo Guerrero MD;  Location: [facility] ENDOSCOPY    D & C      D & C  2013    EYE SURGERY Right 01/2021    CXL with Dr. Joselo Weinstein     YAG CAPSULOTOMY - OD  - RIGHT EYE Right 5/17/17    RJM    YAG CAPSULOTOMY - OS - LEFT EYE Left 5/31/17    RJM      Family History   Problem Relation Age of Onset    Heart Disease Father     Seizure Disorder Mother     Cataracts Maternal Grandmother     Diabetes Neg     Glaucoma Neg     Macular degeneration Neg       Social History:   Social History     Socioeconomic History    Marital status:    Tobacco Use    Smoking status: Never    Smokeless tobacco: Never   Vaping Use    Vaping Use: Never used   Substance and Sexual Activity    Alcohol use: No    Drug use: No    Sexual activity: Yes     Partners: Male   Other Topics Concern    Caffeine Concern Yes     Comment: coffee 1 cup        Objective:   Vitals:    02/05/24 1052   BP: 123/65   Pulse: 58   Resp: 18   Temp: 97.7 °F (36.5 °C)       Physical Exam  Constitutional:       General: She is in acute distress.      Appearance: She is obese.   HENT:      Head: Normocephalic and atraumatic.      Right Ear: Tympanic membrane normal.      Left Ear: Tympanic membrane normal.      Nose: Nose normal.      Mouth/Throat:      Mouth: Mucous membranes are moist.   Cardiovascular:      Rate and Rhythm: Normal rate and regular rhythm.      Heart sounds:      No gallop.   Pulmonary:      Breath sounds: Normal breath sounds.   Musculoskeletal:      Lumbar back: Negative right straight leg raise test and negative left straight leg raise test.        Back:       Comments: Focal pain with deep palpation midportion of right gluteal region as depicted.   Neurological:      Mental Status: She is alert and oriented to person, place, and time.      Deep Tendon Reflexes: Reflexes normal.         Assessment & Plan:   1. Routine physical examination  Aside from stated physical complaint and morbid obesity, this is a normal exam.  The following labs have been ordered.  - Lipid Panel [E]; Future  - TSH [E]; Future  - Comp Metabolic Panel (14) [E]; Future  - Urinalysis, Routine [E]; Future  - CBC W Differential  W Platelet [E]; Future    2. Essential hypertension  Blood pressure measures to goal.  Medication reviewed and renewed.  - losartan 50 MG Oral Tab; Take 1 tablet (50 mg total) by mouth daily.  Dispense: 90 tablet; Refill: 1  - hydroCHLOROthiazide 12.5 MG Oral Tab; Take 1 tablet (12.5 mg total) by mouth daily.  Dispense: 90 tablet; Refill: 1    3. Encounter for screening mammogram for breast cancer  Ordered.  - [facility] 2D+3D SCREENING BILAT (CPT=77067/52472); Future    4. Keratoconus of both eyes  Referred.  - Ophthalmology Referral - In Network    5. Gluteal pain  The following has been prescribed.  The potential cause for this complaint is highly suggestive of sacroiliitis syndrome on the right versus piriformis syndrome on the right.  - methylPREDNISolone (MEDROL) 4 MG Oral Tablet Therapy Pack; As directed.  Dispense: 1 each; Refill: 0  - cyclobenzaprine 5 MG Oral Tab; Take 1 tablet (5 mg total) by mouth 3 (three) times daily as needed for Muscle spasms.  Dispense: 30 tablet; Refill: 0  - Physiatry Referral - In Network      Orders Placed This Encounter   Procedures    Lipid Panel [E]    TSH [E]    Comp Metabolic Panel (14) [E]    Urinalysis, Routine [E]    CBC W Differential W Platelet [E]       Meds This Visit:  Requested Prescriptions     Signed Prescriptions Disp Refills    losartan 50 MG Oral Tab 90 tablet 1     Sig: Take 1 tablet (50 mg total) by mouth daily.    hydroCHLOROthiazide 12.5 MG Oral Tab 90 tablet 1     Sig: Take 1 tablet (12.5 mg total) by mouth daily.    methylPREDNISolone (MEDROL) 4 MG Oral Tablet Therapy Pack 1 each 0     Sig: As directed.    cyclobenzaprine 5 MG Oral Tab 30 tablet 0     Sig: Take 1 tablet (5 mg total) by mouth 3 (three) times daily as needed for Muscle spasms.       Imaging & Referrals:  PHYSIATRY - INTERNAL  OPHTHALMOLOGY - INTERNAL  [facility] 2D+3D SCREENING BILAT (CPT=77067/25979)     Patient Instructions   All adult screening ordered and done appropriate for patient's age  and gender and risk factors and complaints.  Medication reviewed and renewed where needed and appropriate.  Recommend weight loss via daily exercising and consistent healthy dietary changes.  Encouraged safe physical fitness and daily physical activity daily.  Comply with medications.      Return in about 1 year (around 2/5/2025), or if symptoms worsen or fail to improve.

## 2024-02-05 NOTE — PATIENT INSTRUCTIONS
All adult screening ordered and done appropriate for patient's age and gender and risk factors and complaints.  Medication reviewed and renewed where needed and appropriate.  Recommend weight loss via daily exercising and consistent healthy dietary changes.  Encouraged safe physical fitness and daily physical activity daily.  Comply with medications.

## 2024-02-26 ENCOUNTER — TELEPHONE (OUTPATIENT)
Dept: FAMILY MEDICINE CLINIC | Facility: CLINIC | Age: 59
End: 2024-02-26

## 2024-02-26 DIAGNOSIS — R82.90 ABNORMAL URINALYSIS: ICD-10-CM

## 2024-02-26 DIAGNOSIS — N39.0 URINARY TRACT INFECTION WITHOUT HEMATURIA, SITE UNSPECIFIED: Primary | ICD-10-CM

## 2024-02-26 RX ORDER — CIPROFLOXACIN 250 MG/1
250 TABLET, FILM COATED ORAL 2 TIMES DAILY
Qty: 14 TABLET | Refills: 0 | Status: SHIPPED | OUTPATIENT
Start: 2024-02-26 | End: 2024-03-04

## 2024-02-26 NOTE — TELEPHONE ENCOUNTER
Spoke to patient. She just saw her test results and said she does have UTI symptoms. See message below. This whole time, she thought the symptoms were due to new medication but she has dysuria and urgency. RN verified her [facility] pharmacy on file.       Your labs have been reviewed and are found to be within normal limits with the exception of a continued low normal calcium and also the urinalysis is suggestive of a possible urinary tract infection.  Please let us know if you are experiencing urinary frequency or any discomfort with urination or change in color or scent of your urine.  You can take a calcium supplement over-the-counter orally mattie ...   Written by Kyle Maloney,  on 2/11/2024  8:02 AM CST View Full Comments  Seen by patient Sofie Tyler on 2/23/2024 12:10 PM        Dr. Maloney, please advise if you would like to order antibiotics.

## 2024-02-26 NOTE — TELEPHONE ENCOUNTER
Spoke to patient (verified Name and ) and relayed Dr. Maloney's message below. Patient verbalized understanding. She will follow up with pharmacy. No further questions at this time.

## 2024-02-26 NOTE — TELEPHONE ENCOUNTER
Medication for UTI treatment has been sent to the pharmacy.  Please call the patient and let her know.  Thank you.

## 2024-02-27 ENCOUNTER — TELEPHONE (OUTPATIENT)
Dept: FAMILY MEDICINE CLINIC | Facility: CLINIC | Age: 59
End: 2024-02-27

## 2024-02-27 DIAGNOSIS — N39.0 URINARY TRACT INFECTION WITHOUT HEMATURIA, SITE UNSPECIFIED: Primary | ICD-10-CM

## 2024-02-27 NOTE — TELEPHONE ENCOUNTER
Dr. Maloney:  Patient states has been having diarrhea since starting cipro for her UTI and is requesting an alternative antibiotic. Has tried taking Pepto and imodium. Please advise. Thank you      [facility]/pharmacy #[id number] - [place], [place] - [address] [phone], [phone] [address] [address]  Phone: [phone] Fax: [phone]

## 2024-02-28 RX ORDER — CEPHALEXIN 500 MG/1
500 CAPSULE ORAL 4 TIMES DAILY
Qty: 28 CAPSULE | Refills: 0 | Status: SHIPPED | OUTPATIENT
Start: 2024-02-28

## 2024-02-28 NOTE — TELEPHONE ENCOUNTER
Days has patient been taking the Cipro?  If the patient has taken Cipro for 3 days, this might suffice and I would suggest retesting the urine.  Orders are on the chart.  If she has taken it less than 3 days, let me know and I will send in a different prescription.  Thank you.  Please recommend that the patient takes Metamucil for at least 1 week and attempt to bulk her stool.  If the diarrhea persists, we will have to do stool cultures to rule out the possibility of C. difficile.  Thank you.

## 2024-02-28 NOTE — TELEPHONE ENCOUNTER
Patient notified with understanding.  States she has only been on the cipro for 2 days.  Dr. Maloney please advise on alternative.

## 2024-03-22 ENCOUNTER — TELEPHONE (OUTPATIENT)
Dept: FAMILY MEDICINE CLINIC | Facility: CLINIC | Age: 59
End: 2024-03-22

## 2024-03-22 NOTE — TELEPHONE ENCOUNTER
Patient is requesting referral.     Name of specialist and specialty department :  Podiatrist   Reason for visit with the specialist: Right foot pain similar to left foot   Address of the specialist office: n/a  Appointment date:  n/a          CSS informed patient the turnaround time for referral is 5-7 business days.  Patient was informed to check their [employer] account for referral status.

## 2024-03-27 ENCOUNTER — TELEPHONE (OUTPATIENT)
Dept: CASE MANAGEMENT | Age: 59
End: 2024-03-27

## 2024-03-27 DIAGNOSIS — M89.8X7 EXOSTOSIS OF LEFT FOOT: ICD-10-CM

## 2024-03-27 DIAGNOSIS — M19.072 ARTHROSIS OF MIDFOOT, LEFT: ICD-10-CM

## 2024-03-27 DIAGNOSIS — M79.671 RIGHT FOOT PAIN: Primary | ICD-10-CM

## 2024-03-27 NOTE — TELEPHONE ENCOUNTER
Dr. Maloney,     Patient called requesting referral to Dr. Self.     Pended referral please review diagnosis and sign off if you agree.    Thank you.  Teresa Vazquez  [facility]

## 2024-04-02 ENCOUNTER — LAB ENCOUNTER (OUTPATIENT)
Dept: LAB | Facility: HOSPITAL | Age: 59
End: 2024-04-02
Attending: FAMILY MEDICINE
Payer: COMMERCIAL

## 2024-04-02 DIAGNOSIS — N39.0 URINARY TRACT INFECTION WITHOUT HEMATURIA, SITE UNSPECIFIED: ICD-10-CM

## 2024-04-02 PROCEDURE — 87086 URINE CULTURE/COLONY COUNT: CPT

## 2024-04-02 PROCEDURE — 87077 CULTURE AEROBIC IDENTIFY: CPT

## 2024-04-02 PROCEDURE — 81001 URINALYSIS AUTO W/SCOPE: CPT

## 2024-04-03 ENCOUNTER — PATIENT MESSAGE (OUTPATIENT)
Dept: FAMILY MEDICINE CLINIC | Facility: CLINIC | Age: 59
End: 2024-04-03

## 2024-04-03 DIAGNOSIS — N39.0 URINARY TRACT INFECTION WITHOUT HEMATURIA, SITE UNSPECIFIED: Primary | ICD-10-CM

## 2024-04-03 LAB
BILIRUB UR QL: NEGATIVE
CLARITY UR: CLEAR
GLUCOSE UR-MCNC: NORMAL MG/DL
HGB UR QL STRIP.AUTO: NEGATIVE
KETONES UR-MCNC: NEGATIVE MG/DL
LEUKOCYTE ESTERASE UR QL STRIP.AUTO: 25
NITRITE UR QL STRIP.AUTO: NEGATIVE
PH UR: 6 [PH] (ref 5–8)
PROT UR-MCNC: NEGATIVE MG/DL
SP GR UR STRIP: 1.03 (ref 1–1.03)
UROBILINOGEN UR STRIP-ACNC: NORMAL

## 2024-04-04 RX ORDER — AMOXICILLIN 500 MG/1
500 CAPSULE ORAL 3 TIMES DAILY
Qty: 30 CAPSULE | Refills: 0 | Status: SHIPPED | OUTPATIENT
Start: 2024-04-04 | End: 2024-04-14

## 2024-04-04 NOTE — TELEPHONE ENCOUNTER
From: Sofie Tyler  To: Kyle Maloney  Sent: 4/3/2024 9:43 PM CDT  Subject: Yes. I have still urgent to pee.     Yes.

## 2024-04-05 NOTE — TELEPHONE ENCOUNTER
Antibiotic sent to the pharmacy based on the result and the result recommendations for treatment.  Please call the patient and let her know.  Thank you.

## 2024-04-05 NOTE — TELEPHONE ENCOUNTER
Called patient, confirmed name and .    Patient advised that antibiotic was sent to pharmacy due to positive UC.  Patient concerned she will get a yeast infection or have diarrhea with the antibiotic.  Patient advised to call us at the first sign of a yeast infection and/or diarrhea.  Patient verbalized understanding.

## 2024-04-08 ENCOUNTER — OFFICE VISIT (OUTPATIENT)
Dept: PODIATRY CLINIC | Facility: CLINIC | Age: 59
End: 2024-04-08
Payer: COMMERCIAL

## 2024-04-08 DIAGNOSIS — M89.8X7 EXOSTOSIS OF RIGHT FOOT: Primary | ICD-10-CM

## 2024-04-08 DIAGNOSIS — M79.671 FOOT PAIN, RIGHT: ICD-10-CM

## 2024-04-08 PROCEDURE — 99213 OFFICE O/P EST LOW 20 MIN: CPT | Performed by: PODIATRIST

## 2024-04-09 ENCOUNTER — TELEPHONE (OUTPATIENT)
Dept: PODIATRY CLINIC | Facility: CLINIC | Age: 59
End: 2024-04-09

## 2024-04-09 NOTE — TELEPHONE ENCOUNTER
Surgical request not sent yet. S/W patient and got a date in mind. Informed patient that  is currently on vacation so I will touch bases with her some time mid next week to get her scheduled. Patient expressed verbal understanding at this time

## 2024-04-12 ENCOUNTER — TELEPHONE (OUTPATIENT)
Dept: CASE MANAGEMENT | Age: 59
End: 2024-04-12

## 2024-04-12 NOTE — TELEPHONE ENCOUNTER
Please provide this patient with all of the information and the providers available to her within the network so that she can have time to research them and see which one she thinks that she would prefer to be seen by.  Once she makes a decision she can let us know and then I will generate referral.  Thank you.

## 2024-04-12 NOTE — TELEPHONE ENCOUNTER
Hello     The health plan has closed the request to Dr. Simms. The health plan would like patient to in network provider.     The in network providers  Dr. Karan Allred    Please advise and re-direct patient.     Thank you,  [place]  Referral specialist    There is not sufficient clinical documentation to substantiate this request. Please refer this member to an in-network provider such as Dr. Karan Garcia, Dr. Caridad March, Dr. Tricia Ovalle, or Dr. Rocio Allred. Per [employer] Guidelines regarding out of network/tertiary services, a letter of medical necessity verifying the rarity of this member's illness that precludes treatment within the [facility] network is required to support the use of an out of network or tertiary facility and will be subject to [facility] Medical Director review. If you need assistance finding an in-network provider, please contact customer service at [phone]. Please utilize the [employer] website to find an in-, submit a new referral, check claims status, and more. If you intend to pursue this request, please submit a new request and provide supporting clinical documentation to indicate medical necessity for the use of a tertiary provider. This request will be closed 4/12/24 to comply with timeliness standards set by [facility].

## 2024-04-13 ENCOUNTER — TELEPHONE (OUTPATIENT)
Dept: PODIATRY CLINIC | Facility: CLINIC | Age: 59
End: 2024-04-13

## 2024-04-13 DIAGNOSIS — M89.8X7 EXOSTOSIS OF RIGHT FOOT: Primary | ICD-10-CM

## 2024-04-13 DIAGNOSIS — M79.671 FOOT PAIN, RIGHT: ICD-10-CM

## 2024-04-13 NOTE — TELEPHONE ENCOUNTER
Procedure: Dorsal exostectomy right foot  CPT code: I will have to get this code to you  Length of Surgery: 1 hour 15 minutes  Any Instruments: Mini power, mini fluoroscopy  Call patient: ASAP  Anesthesia: MAC  Location: [facility]  Assistance: none  Pacemaker: No  Anticoagulants: No  Nickel Allergy: No  Latex Allergy: No  Diagnosis/ICD Code:     ICD-10-CM    1. Exostosis of right foot  M89.8X7       2. Foot pain, right  M79.671

## 2024-04-13 NOTE — PROGRESS NOTES
Sofie Tyler is a 58 year old female.   Chief Complaint   Patient presents with    Foot Pain     Right foot pain with shoes- pain 2/10         HPI:   This pleasant patient returns to the clinic we had removed a large osteochondroma from her left foot now she is feeling a spur but it is painful on the top of her right foot.  She points to roughly the same area over the second metatarsal cuneiform joint.  At today's visit reviewed nurse's history as taken above, allergies medications and medical history as documented below.  All changes duly noted  Allergies: Patient has no known allergies.   Current Outpatient Medications   Medication Sig Dispense Refill    amoxicillin 500 MG Oral Cap Take 1 capsule (500 mg total) by mouth 3 (three) times daily for 10 days. 30 capsule 0    cephalexin 500 MG Oral Cap Take 1 capsule (500 mg total) by mouth 4 (four) times daily. 28 capsule 0    losartan 50 MG Oral Tab Take 1 tablet (50 mg total) by mouth daily. 90 tablet 1    hydroCHLOROthiazide 12.5 MG Oral Tab Take 1 tablet (12.5 mg total) by mouth daily. 90 tablet 1    methylPREDNISolone (MEDROL) 4 MG Oral Tablet Therapy Pack As directed. 1 each 0    cyclobenzaprine 5 MG Oral Tab Take 1 tablet (5 mg total) by mouth 3 (three) times daily as needed for Muscle spasms. 30 tablet 0    Hydroquinone 4 % External Cream Apply 1 Application. topically 2 (two) times daily. 30 g 0    HYDROcodone-acetaminophen 5-325 MG Oral Tab Take 1 tablet by mouth every 6 (six) hours as needed for Pain. 30 tablet 0    levocetirizine 5 MG Oral Tab Take 1 tablet (5 mg total) by mouth every evening. 90 tablet 1    Fexofenadine-Pseudoephed ER  MG Oral Tablet 12 Hr Take 1 tablet by mouth 2 (two) times daily. (Patient taking differently: Take 1 tablet by mouth 2 (two) times daily as needed.) 30 tablet 0    Spacer/Aero Chamber Mouthpiece Does not apply Misc To be used with inhaler as needed. 1 each 0    Biotin 92110 MCG Oral Tab Take 1 tablet by  mouth daily.        Past Medical History:    Abnormal uterine bleeding    IUD    Amenorrhea    IUD    Asthma (HCC)    Astigmatism    high     Astigmatism, irregular    OU    High blood pressure    High myopia    History of pregnancy    EAB x2 1985, 1997 no comp    Hypertension    Losartian    Infertility, female    Iritis    9/20/12    Keratoconus    2018- referred to Dr. Joselo Carrion     Lipid screening    RUPAL (obstructive sleep apnea)    AHI 11.2 Supine AHI 14 non-supine AHI 8.5 Sao2 Vishnu 81%    Presbyopia    Sleep apnea    Tuberculosis contact    took INH medication      Past Surgical History:   Procedure Laterality Date    Cataract extraction w/  intraocular lens implant Right 8/31/15    [facility] with Vision Blue and Shugarcaine    Cataract extraction w/  intraocular lens implant Left 9/14/15    RJM with Vision Blue and Shugarcaine    Colon surgery  2016    routine colonoscopy    Colonoscopy N/A 1/13/2017    Procedure: COLONOSCOPY;  Surgeon: Domingo Guerrero MD;  Location: [facility] ENDOSCOPY    Colonoscopy N/A 6/24/2022    Procedure: COLONOSCOPY;  Surgeon: Domingo Guerrero MD;  Location: [facility] ENDOSCOPY    D & c      D & c  2013    Eye surgery Right 01/2021    CXL with Dr. Joselo Weinstein     Yag capsulotomy - od - right eye Right 5/17/17    RJ    Yag capsulotomy - os - left eye Left 5/31/17    RJ      Family History   Problem Relation Age of Onset    Heart Disease Father     Seizure Disorder Mother     Cataracts Maternal Grandmother     Diabetes Neg     Glaucoma Neg     Macular degeneration Neg       Social History     Socioeconomic History    Marital status:    Tobacco Use    Smoking status: Never    Smokeless tobacco: Never   Vaping Use    Vaping status: Never Used   Substance and Sexual Activity    Alcohol use: No    Drug use: No    Sexual activity: Yes     Partners: Male   Other Topics Concern    Caffeine Concern Yes     Comment: coffee 1 cup           REVIEW OF SYSTEMS:   Today reviewed systens as  documented below  GENERAL HEALTH: feels well otherwise  SKIN: Refer to exam below  RESPIRATORY: denies shortness of breath with exertion  CARDIOVASCULAR: denies chest pain on exertion  GI: denies abdominal pain and denies heartburn  NEURO: denies headaches    EXAM:   [facility] 07/23/2014   GENERAL: well developed, well nourished, in no apparent distress  EXTREMITIES:   1. Integument: The skin on her right foot was evaluated is warm and dry she has a palpable bone spur on the dorsal aspect of her right foot in the midfoot area.   2. Vascular: Patient has palpable pulses dorsalis pedis posterior tibial on the right   3. Neurologic: Patient has intact sensorium   4. Musculoskeletal: Patient has good muscle strength she has palpable bone spur over the second metatarsal cuneiform joint.  Does not seem to be as prominent as the last 1.    ASSESSMENT AND PLAN:   Diagnoses and all orders for this visit:    Exostosis of right foot    Foot pain, right    Other orders  -     [facility] AMB POD XR - RT FOOT 3 VIEWS(AP,LAT,OBLIQUE)WT BEARING        Plan: X-rays were taken 3 views showing a sprain to be over the second met cuneiform joint.  Most visible on the lateral  At today's visit both conservative and surgical management was discussed for the diagnoses listed above.  After a lengthy conversation the patient opted for surgery after questions were answered.  The nature and extent of the surgery which would be, exostectomy dorsal right foot was explained in great detail.  The pre-, stefani-and postoperative management was discussed, along with changes in bathing habits as well.  The necessity for restricted activity possible nonweightbearing was also reviewed.  The possible complications and risks associated with the surgery including but not limited to recurrence of the deformity, nonresolution of the problem, infection as well as hospitalization and intravenous antibiotics if that occurs, the necessity for further surgery and/or  hospitalization should complications occur or if an undesired result was obtained, and permanent numbness around the surgical site, guarantees or assurances were not offered.  Questions were invited and answered to the best of my ability.  At this time the patient wished to proceed with the surgical procedure and we will try to get that scheduled to the patient's convenience.   The patient indicates understanding of these issues and agrees to the plan.    Dylon Self DPM

## 2024-04-14 NOTE — TELEPHONE ENCOUNTER
Per patient:        Ms. Keisha MARIA;      Neither one of those doctors treat   Keratoconus.     Dr. Yony KENDRICK treats my eye disease, Keratoconus.

## 2024-04-16 NOTE — TELEPHONE ENCOUNTER
S/W patient and she is thinking about scheduling for 9/27 pending spouse's schedule. Patient has asked that I call her back next week ( week of 4/22) to get a set surgery date. Patient has also informed me that her procedure has to be performed at a hospital setting due to her BMI. I informed patient that I will make note of her surgery location and will check with the Main OR for availability on her requested date.

## 2024-04-20 ENCOUNTER — TELEPHONE (OUTPATIENT)
Dept: FAMILY MEDICINE CLINIC | Facility: CLINIC | Age: 59
End: 2024-04-20

## 2024-04-20 DIAGNOSIS — M79.18 GLUTEAL PAIN: ICD-10-CM

## 2024-04-20 DIAGNOSIS — N39.0 URINARY TRACT INFECTION WITHOUT HEMATURIA, SITE UNSPECIFIED: ICD-10-CM

## 2024-04-20 DIAGNOSIS — B37.31 YEAST VAGINITIS: ICD-10-CM

## 2024-04-22 ENCOUNTER — TELEPHONE (OUTPATIENT)
Dept: PODIATRY CLINIC | Facility: CLINIC | Age: 59
End: 2024-04-22

## 2024-04-22 DIAGNOSIS — M79.671 RIGHT FOOT PAIN: Primary | ICD-10-CM

## 2024-04-22 DIAGNOSIS — M89.8X7 EXOSTOSIS OF RIGHT FOOT: ICD-10-CM

## 2024-04-22 DIAGNOSIS — M79.671 FOOT PAIN, RIGHT: ICD-10-CM

## 2024-04-22 DIAGNOSIS — M89.8X7 EXOSTOSIS OF RIGHT FOOT: Primary | ICD-10-CM

## 2024-04-22 RX ORDER — CEPHALEXIN 500 MG/1
500 CAPSULE ORAL 4 TIMES DAILY
Qty: 28 CAPSULE | Refills: 0 | OUTPATIENT
Start: 2024-04-22

## 2024-04-22 RX ORDER — CYCLOBENZAPRINE HCL 5 MG
5 TABLET ORAL 3 TIMES DAILY PRN
Qty: 30 TABLET | Refills: 0 | OUTPATIENT
Start: 2024-04-22

## 2024-04-22 RX ORDER — METHYLPREDNISOLONE 4 MG/1
TABLET ORAL
Qty: 21 EACH | Refills: 0 | OUTPATIENT
Start: 2024-04-22

## 2024-04-22 RX ORDER — FLUCONAZOLE 150 MG/1
150 TABLET ORAL ONCE
Qty: 1 TABLET | Refills: 0 | OUTPATIENT
Start: 2024-04-22 | End: 2024-04-22

## 2024-04-22 RX ORDER — AMOXICILLIN 500 MG/1
500 CAPSULE ORAL 3 TIMES DAILY
Qty: 30 CAPSULE | Refills: 0 | OUTPATIENT
Start: 2024-04-22

## 2024-04-22 RX ORDER — SULFAMETHOXAZOLE AND TRIMETHOPRIM 800; 160 MG/1; MG/1
1 TABLET ORAL 2 TIMES DAILY
Qty: 14 TABLET | Refills: 0 | OUTPATIENT
Start: 2024-04-22

## 2024-04-22 NOTE — TELEPHONE ENCOUNTER
Okay. Please have patient scheduled preoperative exam with Dr. Maloney 1 month prior to surgery.    No

## 2024-04-22 NOTE — TELEPHONE ENCOUNTER
Dr. Maloney,    The patient is scheduled for outpatient foot surgery with Dr. Self on 9/27 for the following procedure(s): Dorsal exostectomy right foot.      The patient has been advised to contact your office for pre-operative clearance.  The patient needs the following test/exams    __X__ History and Physical (H&P) may be no older than THIRTY (30) days prior to surgery    __X__ EKG for patients that are age 50 or older, have hypertension, diabetes or a history of cardiac disease or are obese.  This should be no older than 6 months prior to surgery.    __X__ Complete Metabolic Panel (CMP) for patients that are age 65 and older, have hypertension, diabetes or heart problems, are taking any heart medication or are obese.  This should be no older than 3 months prior to surgery.    Please include any other test you deem necessary for medical clearance.    Thank you,   Cherry   Surgical Scheduler  [phone]

## 2024-04-22 NOTE — TELEPHONE ENCOUNTER
Called patient in regards to message below ( identified name and  )   Patient would like to make the appointment       Future Appointments   Date Time Provider Department Center   2024 11:20 AM Kyle Maloney DO [facility]   2024  2:20 PM [facility] RM1 [facility] EM [facility]   9/3/2024  9:00 AM Kyle Maloney DO [facility]   10/3/2024 11:00 AM Dylon Self DPM [id number]   10/10/2024 11:00 AM Dylon Self DPM [id number]

## 2024-04-29 ENCOUNTER — HOSPITAL ENCOUNTER (OUTPATIENT)
Age: 59
Discharge: HOME | End: 2024-04-29
Payer: COMMERCIAL

## 2024-04-29 VITALS
TEMPERATURE: 97 F | SYSTOLIC BLOOD PRESSURE: 128 MMHG | HEART RATE: 52 BPM | DIASTOLIC BLOOD PRESSURE: 90 MMHG | RESPIRATION RATE: 18 BRPM | OXYGEN SATURATION: 100 %

## 2024-04-29 DIAGNOSIS — R21 RASH AND NONSPECIFIC SKIN ERUPTION: Primary | ICD-10-CM

## 2024-04-29 PROCEDURE — 99213 OFFICE O/P EST LOW 20 MIN: CPT | Performed by: NURSE PRACTITIONER

## 2024-04-29 RX ORDER — PREDNISONE 20 MG/1
40 TABLET ORAL DAILY
Qty: 10 TABLET | Refills: 0 | Status: SHIPPED | OUTPATIENT
Start: 2024-04-29 | End: 2024-05-04

## 2024-04-29 RX ORDER — HYDROXYZINE PAMOATE 25 MG/1
25 CAPSULE ORAL 3 TIMES DAILY PRN
Qty: 15 CAPSULE | Refills: 0 | Status: SHIPPED | OUTPATIENT
Start: 2024-04-29

## 2024-04-29 NOTE — ED PROVIDER NOTES
Patient Seen in: [facility]      History     Chief Complaint   Patient presents with    Rash     Stated Complaint: rash    Subjective:   HPI  Patient is a 58-year-old female who presents to the immediate care center with concern for rash to her lower extremity and back.  This started after she has been gone using a new gym.  She states she has been sitting in a carpeted area.  She denies fever or chills; denies myalgia or arthralgia.  She does acknowledge that the rash is itchy.  She has not been using a hot tub or bathing facilities at the gym.          Objective:   Past Medical History:    Abnormal uterine bleeding    IUD    Amenorrhea    IUD    Asthma (HCC)    Astigmatism    high     Astigmatism, irregular    OU    High blood pressure    High myopia    History of pregnancy    EAB x2 1985, 1997 no comp    Hypertension    Losartian    Infertility, female    Iritis    9/20/12    Keratoconus    2018- referred to Dr. Joselo Carrion     Lipid screening    RUPAL (obstructive sleep apnea)    AHI 11.2 Supine AHI 14 non-supine AHI 8.5 Sao2 Vishnu 81%    Presbyopia    Sleep apnea    Tuberculosis contact    took INH medication              Past Surgical History:   Procedure Laterality Date    Cataract extraction w/  intraocular lens implant Right 8/31/15    [facility] with Vision Blue and Shugarcaine    Cataract extraction w/  intraocular lens implant Left 9/14/15    [facility] with Vision Blue and Shugarcaine    Colon surgery  2016    routine colonoscopy    Colonoscopy N/A 1/13/2017    Procedure: COLONOSCOPY;  Surgeon: Domingo Guerrero MD;  Location: [facility] ENDOSCOPY    Colonoscopy N/A 6/24/2022    Procedure: COLONOSCOPY;  Surgeon: Domingo Guerrero MD;  Location: [facility] ENDOSCOPY    D & c      D & c  2013    Eye surgery Right 01/2021    CXL with Dr. Joselo Weinstein     Yag capsulotomy - od - right eye Right 5/17/17    [facility]    Yag capsulotomy - os - left eye Left 5/31/17    [facility]                Social History     Socioeconomic History     Marital status:    Tobacco Use    Smoking status: Never    Smokeless tobacco: Never   Vaping Use    Vaping status: Never Used   Substance and Sexual Activity    Alcohol use: No    Drug use: No    Sexual activity: Yes     Partners: Male   Other Topics Concern    Caffeine Concern Yes     Comment: coffee 1 cup              Review of Systems   Constitutional:  Negative for appetite change, chills, fatigue and fever.   Respiratory:  Negative for cough and shortness of breath.    Skin:  Positive for rash.   Neurological:  Negative for weakness.       Positive for stated complaint: rash  Other systems are as noted in HPI.  Constitutional and vital signs reviewed.      All other systems reviewed and negative except as noted above.    Physical Exam     ED Triage Vitals [04/29/24 0946]   /90   Pulse 52   Resp 18   Temp 97.4 °F (36.3 °C)   Temp src Temporal   SpO2 100 %   O2 Device None (Room air)       Current:/90   Pulse 52   Temp 97.4 °F (36.3 °C) (Temporal)   Resp 18   LMP 07/23/2014   SpO2 100%         Physical Exam  Vitals and nursing note reviewed.   Constitutional:       General: She is not in acute distress.     Appearance: She is not ill-appearing.   Pulmonary:      Effort: Pulmonary effort is normal. No respiratory distress.   Skin:     General: Skin is warm and dry.             Comments: Sparse distribution of fine papular lesions on nonerythematous base.   Neurological:      Mental Status: She is alert and oriented to person, place, and time.   Psychiatric:         Behavior: Behavior normal.               ED Course   Labs Reviewed - No data to display                   MDM      Patient was advised that because this started after contact with this new carpet, we will treat this like a contact dermatitis.  She was asked to monitor symptoms closely and follow with primary care provider next week if symptoms do not resolve.  She states understanding and agrees with plan.                                    Medical Decision Making  Differential diagnoses considered today include, but are not exclusive of: Contact dermatitis; folliculitis; tinea corporis; no exanthem; allergic reaction.    Problems Addressed:  Rash and nonspecific skin eruption: self-limited or minor problem    Risk  OTC drugs.  Prescription drug management.        Disposition and Plan     Clinical Impression:  1. Rash and nonspecific skin eruption         Disposition:  Discharge  4/29/2024 10:12 am    Follow-up:  Kyle Maloney, DO  [address] [place] [id number]  [phone]    Schedule an appointment as soon as possible for a visit in 1 week  As needed          Medications Prescribed:  Discharge Medication List as of 4/29/2024 10:19 AM        START taking these medications    Details   predniSONE 20 MG Oral Tab Take 2 tablets (40 mg total) by mouth daily for 5 days., Normal, Disp-10 tablet, R-0      hydrOXYzine Pamoate (VISTARIL) 25 MG Oral Cap Take 1 capsule (25 mg total) by mouth 3 (three) times daily as needed for Itching., Normal, Disp-15 capsule, R-0

## 2024-04-29 NOTE — ED INITIAL ASSESSMENT (HPI)
Pt presents with generalized small red raised rash x 4 days. Pt reports itchy.     Pt used Ammonium Lactate lotion with some relief.     Pt recently completed Amoxicillin for UTI, pt has taken Amoxicillin in the past with no issues.

## 2024-05-25 ENCOUNTER — LAB ENCOUNTER (OUTPATIENT)
Dept: LAB | Facility: HOSPITAL | Age: 59
End: 2024-05-25
Attending: FAMILY MEDICINE

## 2024-05-25 ENCOUNTER — HOSPITAL ENCOUNTER (OUTPATIENT)
Dept: MAMMOGRAPHY | Facility: HOSPITAL | Age: 59
Discharge: HOME | End: 2024-05-25
Attending: FAMILY MEDICINE

## 2024-05-25 ENCOUNTER — OFFICE VISIT (OUTPATIENT)
Dept: FAMILY MEDICINE CLINIC | Facility: CLINIC | Age: 59
End: 2024-05-25

## 2024-05-25 VITALS
DIASTOLIC BLOOD PRESSURE: 60 MMHG | SYSTOLIC BLOOD PRESSURE: 135 MMHG | HEART RATE: 55 BPM | WEIGHT: 245 LBS | OXYGEN SATURATION: 97 % | BODY MASS INDEX: 41 KG/M2 | TEMPERATURE: 98 F | RESPIRATION RATE: 18 BRPM

## 2024-05-25 DIAGNOSIS — Z12.31 ENCOUNTER FOR SCREENING MAMMOGRAM FOR BREAST CANCER: ICD-10-CM

## 2024-05-25 DIAGNOSIS — J45.20 MILD INTERMITTENT ASTHMA WITHOUT COMPLICATION (HCC): ICD-10-CM

## 2024-05-25 DIAGNOSIS — I10 PRIMARY HYPERTENSION: ICD-10-CM

## 2024-05-25 DIAGNOSIS — R39.15 URINARY URGENCY: ICD-10-CM

## 2024-05-25 DIAGNOSIS — E66.01 CLASS 3 SEVERE OBESITY WITH SERIOUS COMORBIDITY AND BODY MASS INDEX (BMI) OF 40.0 TO 44.9 IN ADULT, UNSPECIFIED OBESITY TYPE (HCC): Primary | ICD-10-CM

## 2024-05-25 LAB
BILIRUB UR QL: NEGATIVE
COLOR UR: YELLOW
GLUCOSE UR-MCNC: NORMAL MG/DL
HGB UR QL STRIP.AUTO: NEGATIVE
HYALINE CASTS #/AREA URNS AUTO: PRESENT /LPF
KETONES UR-MCNC: NEGATIVE MG/DL
LEUKOCYTE ESTERASE UR QL STRIP.AUTO: 250
NITRITE UR QL STRIP.AUTO: NEGATIVE
PH UR: 5.5 [PH] (ref 5–8)
SP GR UR STRIP: 1.03 (ref 1–1.03)
UROBILINOGEN UR STRIP-ACNC: NORMAL

## 2024-05-25 PROCEDURE — 81001 URINALYSIS AUTO W/SCOPE: CPT

## 2024-05-25 PROCEDURE — 77067 SCR MAMMO BI INCL CAD: CPT | Performed by: FAMILY MEDICINE

## 2024-05-25 PROCEDURE — 3078F DIAST BP <80 MM HG: CPT | Performed by: FAMILY MEDICINE

## 2024-05-25 PROCEDURE — 77063 BREAST TOMOSYNTHESIS BI: CPT | Performed by: FAMILY MEDICINE

## 2024-05-25 PROCEDURE — 87086 URINE CULTURE/COLONY COUNT: CPT

## 2024-05-25 PROCEDURE — 99214 OFFICE O/P EST MOD 30 MIN: CPT | Performed by: FAMILY MEDICINE

## 2024-05-25 PROCEDURE — 3075F SYST BP GE 130 - 139MM HG: CPT | Performed by: FAMILY MEDICINE

## 2024-05-25 PROCEDURE — 87077 CULTURE AEROBIC IDENTIFY: CPT

## 2024-05-25 RX ORDER — LOSARTAN POTASSIUM 50 MG/1
50 TABLET ORAL DAILY
Qty: 90 TABLET | Refills: 1 | Status: SHIPPED | OUTPATIENT
Start: 2024-05-25

## 2024-05-25 RX ORDER — HYDROCHLOROTHIAZIDE 12.5 MG/1
12.5 TABLET ORAL DAILY
Qty: 90 TABLET | Refills: 1 | Status: SHIPPED | OUTPATIENT
Start: 2024-05-25

## 2024-05-25 NOTE — PROGRESS NOTES
Subjective:     Patient ID: Sofie Tyler is a 59 year old female.    This patient is a well-established 59-year-old hypertensive/mildly intermittent asthmatic/morbidly obese by definition -American female who presents for general checkup follow-up on the status of these chronic conditions.    Patient denies headaches, chest pain, dizziness, shortness of breath, visual changes, and/or exertional fatigue.    The patient is not consistent with increased physical exercise.  Patient does not weigh herself, but her clothing is fitting loosely.  Patient is able to navigate her workouts without discomfort pre and post exercise.    Patient does have occasional urinary frequency urgency without dysuria or hematuria.  We will reevaluate her urine.        History/Other:   Review of Systems  Current Outpatient Medications   Medication Sig Dispense Refill    losartan 50 MG Oral Tab Take 1 tablet (50 mg total) by mouth daily. 90 tablet 1    hydroCHLOROthiazide 12.5 MG Oral Tab Take 1 tablet (12.5 mg total) by mouth daily. 90 tablet 1    hydrOXYzine Pamoate (VISTARIL) 25 MG Oral Cap Take 1 capsule (25 mg total) by mouth 3 (three) times daily as needed for Itching. 15 capsule 0    cephalexin 500 MG Oral Cap Take 1 capsule (500 mg total) by mouth 4 (four) times daily. 28 capsule 0    methylPREDNISolone (MEDROL) 4 MG Oral Tablet Therapy Pack As directed. 1 each 0    cyclobenzaprine 5 MG Oral Tab Take 1 tablet (5 mg total) by mouth 3 (three) times daily as needed for Muscle spasms. 30 tablet 0    Hydroquinone 4 % External Cream Apply 1 Application. topically 2 (two) times daily. 30 g 0    HYDROcodone-acetaminophen 5-325 MG Oral Tab Take 1 tablet by mouth every 6 (six) hours as needed for Pain. 30 tablet 0    levocetirizine 5 MG Oral Tab Take 1 tablet (5 mg total) by mouth every evening. 90 tablet 1    Fexofenadine-Pseudoephed ER  MG Oral Tablet 12 Hr Take 1 tablet by mouth 2 (two) times daily. (Patient taking  differently: Take 1 tablet by mouth 2 (two) times daily as needed.) 30 tablet 0    Spacer/Aero Chamber Mouthpiece Does not apply Misc To be used with inhaler as needed. 1 each 0    Biotin 60825 MCG Oral Tab Take 1 tablet by mouth daily.       Allergies:No Known Allergies    Past Medical History:    Abnormal uterine bleeding    IUD    Amenorrhea    IUD    Asthma (HCC)    Astigmatism    high     Astigmatism, irregular    OU    High blood pressure    High myopia    History of pregnancy    EAB x2 1985, 1997 no comp    Hypertension    Losartian    Infertility, female    Iritis    9/20/12    Keratoconus    2018- referred to Dr. Joselo Carrion     Lipid screening    RUPAL (obstructive sleep apnea)    AHI 11.2 Supine AHI 14 non-supine AHI 8.5 Sao2 Vishnu 81%    Presbyopia    Sleep apnea    Tuberculosis contact    took INH medication      Past Surgical History:   Procedure Laterality Date    Cataract extraction w/  intraocular lens implant Right 8/31/15    [facility] with [facility] and Shugarcaine    Cataract extraction w/  intraocular lens implant Left 9/14/15    [facility] with [facility] and Shugarcaine    Colon surgery  2016    routine colonoscopy    Colonoscopy N/A 1/13/2017    Procedure: COLONOSCOPY;  Surgeon: Domingo Guerrero MD;  Location: [facility] ENDOSCOPY    Colonoscopy N/A 6/24/2022    Procedure: COLONOSCOPY;  Surgeon: Domingo Guerrero MD;  Location: [facility] ENDOSCOPY    D & c      D & c  2013    Eye surgery Right 01/2021    CXL with Dr. Joselo Weinstein     Yag capsulotomy - od - right eye Right 5/17/17    [facility]    Yag capsulotomy - os - left eye Left 5/31/17    [facility]      Family History   Problem Relation Age of Onset    Heart Disease Father     Seizure Disorder Mother     Cataracts Maternal Grandmother     Diabetes Neg     Glaucoma Neg     Macular degeneration Neg       Social History:   Social History     Socioeconomic History    Marital status:    Tobacco Use    Smoking status: Never    Smokeless tobacco: Never   Vaping Use     Vaping status: Never Used   Substance and Sexual Activity    Alcohol use: No    Drug use: No    Sexual activity: Yes     Partners: Male   Other Topics Concern    Caffeine Concern Yes     Comment: coffee 1 cup        Objective:   Vitals:    05/25/24 1105   BP: 135/60   Pulse: 55   Resp: 18   Temp: 98 °F (36.7 °C)       Physical Exam  Constitutional:       Appearance: She is obese.   HENT:      Head: Normocephalic and atraumatic.      Right Ear: Tympanic membrane normal.      Left Ear: Tympanic membrane normal.      Nose: Nose normal.      Mouth/Throat:      Mouth: Mucous membranes are moist.   Neck:      Thyroid: No thyromegaly.   Cardiovascular:      Rate and Rhythm: Normal rate and regular rhythm.      Heart sounds:      No gallop.   Pulmonary:      Breath sounds: Normal breath sounds.         Assessment & Plan:   1. Primary hypertension  Medication reviewed and renewed where needed and appropriate.  - losartan 50 MG Oral Tab; Take 1 tablet (50 mg total) by mouth daily.  Dispense: 90 tablet; Refill: 1  - hydroCHLOROthiazide 12.5 MG Oral Tab; Take 1 tablet (12.5 mg total) by mouth daily.  Dispense: 90 tablet; Refill: 1    2. Class 3 severe obesity with serious comorbidity and body mass index (BMI) of 40.0 to 44.9 in adult, unspecified obesity type (HCC)  Recommend weight loss via daily exercising and consistent healthy dietary changes.    3. Urinary urgency  Ordered.  - Urinalysis with Culture Reflex; Future    4. Mild intermittent asthma without complication (HCC)  Currently asymptomatic.      Orders Placed This Encounter   Procedures    Urinalysis with Culture Reflex       Meds This Visit:  Requested Prescriptions     Signed Prescriptions Disp Refills    losartan 50 MG Oral Tab 90 tablet 1     Sig: Take 1 tablet (50 mg total) by mouth daily.    hydroCHLOROthiazide 12.5 MG Oral Tab 90 tablet 1     Sig: Take 1 tablet (12.5 mg total) by mouth daily.       Imaging & Referrals:  None     Patient Instructions    Medication reviewed and renewed where needed and appropriate.  Recommend weight loss via daily exercising and consistent healthy dietary changes.  Encouraged safe physical fitness and daily physical activity daily.  Comply with medications.  Monitor blood pressures and record at home. Limit salt intake.      Return if symptoms worsen or fail to improve.

## 2024-05-25 NOTE — PATIENT INSTRUCTIONS
Medication reviewed and renewed where needed and appropriate.  Recommend weight loss via daily exercising and consistent healthy dietary changes.  Encouraged safe physical fitness and daily physical activity daily.  Comply with medications.  Monitor blood pressures and record at home. Limit salt intake.

## 2024-05-29 ENCOUNTER — TELEPHONE (OUTPATIENT)
Dept: FAMILY MEDICINE CLINIC | Facility: CLINIC | Age: 59
End: 2024-05-29

## 2024-05-29 RX ORDER — PENICILLIN V POTASSIUM 500 MG/1
500 TABLET ORAL 3 TIMES DAILY
Qty: 21 TABLET | Refills: 0 | Status: SHIPPED | OUTPATIENT
Start: 2024-05-29 | End: 2024-06-05

## 2024-05-29 NOTE — TELEPHONE ENCOUNTER
Copied from result notes:    Your urinalysis was positive for strep species.  Antibiotics have been sent to the pharmacy.  Thank you.   Written by Kyle Maloney DO on 5/27/2024  2:29 PM CDT  Seen by patient Sofie Tyler on 5/29/2024 10:04 AM    It doesn't look like antibiotics were sent.  Routed to provider and pod mate as doctor is off today.

## 2024-05-30 ENCOUNTER — TELEPHONE (OUTPATIENT)
Dept: PODIATRY CLINIC | Facility: CLINIC | Age: 59
End: 2024-05-30

## 2024-05-30 NOTE — TELEPHONE ENCOUNTER
Pt called to cancel surgery 9-27-24 and the post op appointments.  Will call back to reschedule.

## 2024-06-03 DIAGNOSIS — M79.671 FOOT PAIN, RIGHT: ICD-10-CM

## 2024-06-03 DIAGNOSIS — M89.8X7 EXOSTOSIS OF RIGHT FOOT: Primary | ICD-10-CM

## 2024-08-14 ENCOUNTER — OFFICE VISIT (OUTPATIENT)
Dept: FAMILY MEDICINE CLINIC | Facility: CLINIC | Age: 59
End: 2024-08-14
Payer: COMMERCIAL

## 2024-08-14 VITALS
HEART RATE: 60 BPM | OXYGEN SATURATION: 98 % | BODY MASS INDEX: 55 KG/M2 | TEMPERATURE: 98 F | SYSTOLIC BLOOD PRESSURE: 110 MMHG | DIASTOLIC BLOOD PRESSURE: 80 MMHG | WEIGHT: 293 LBS | RESPIRATION RATE: 18 BRPM

## 2024-08-14 DIAGNOSIS — I11.9 LVH (LEFT VENTRICULAR HYPERTROPHY) DUE TO HYPERTENSIVE DISEASE, WITHOUT HEART FAILURE: ICD-10-CM

## 2024-08-14 DIAGNOSIS — J45.20 MILD INTERMITTENT ASTHMA WITHOUT COMPLICATION (HCC): ICD-10-CM

## 2024-08-14 DIAGNOSIS — E66.01 CLASS 3 SEVERE OBESITY WITH SERIOUS COMORBIDITY AND BODY MASS INDEX (BMI) OF 50.0 TO 59.9 IN ADULT, UNSPECIFIED OBESITY TYPE (HCC): ICD-10-CM

## 2024-08-14 DIAGNOSIS — H18.603 KERATOCONUS OF BOTH EYES: ICD-10-CM

## 2024-08-14 DIAGNOSIS — R19.5 LOOSE STOOLS: Primary | ICD-10-CM

## 2024-08-14 DIAGNOSIS — I10 PRIMARY HYPERTENSION: ICD-10-CM

## 2024-08-14 PROCEDURE — 3079F DIAST BP 80-89 MM HG: CPT | Performed by: FAMILY MEDICINE

## 2024-08-14 PROCEDURE — 3074F SYST BP LT 130 MM HG: CPT | Performed by: FAMILY MEDICINE

## 2024-08-14 PROCEDURE — 99214 OFFICE O/P EST MOD 30 MIN: CPT | Performed by: FAMILY MEDICINE

## 2024-08-14 NOTE — PATIENT INSTRUCTIONS
Medication reviewed and renewed where needed and appropriate.  Recommend weight loss via daily exercising and consistent healthy dietary changes.  Monitor blood pressures and record at home. Limit salt intake.  Comply with medications.  Consider FiberCon.  Continue with B 12 and calcium supplement

## 2024-08-20 NOTE — PROGRESS NOTES
Subjective:     Patient ID: Sofie Tyler is a 59 year old female.    This patient is a well-established 59-year-old hypertensive/morbidly obese/mildly persistent asthmatic/keratoconus -American female who presents to the clinic for follow-up regarding these chronic illnesses.  Patient needs referral to follow-up on her eye pathology.    Patient currently denies headaches, chest pain, dizziness, shortness of breath, any acute changes associated with hypertension, and/or exertional fatigue.    Patient has began a physical exercise regimen with intention and with consistency.  Patient has altered her dietary intake as well-weight loss obtained and patient encouraged.    Patient currently asymptomatic with regards to asthma.    Patient also reports loose, but not frequent stools without evidence of bleeding or dark and color.  There is no fever association.  The patient does not complain of abdominal bloating cramping.  This has been a few days.  Patient's spouse does not complain of any similar symptoms.        History/Other:   Review of Systems  Current Outpatient Medications   Medication Sig Dispense Refill    losartan 50 MG Oral Tab Take 1 tablet (50 mg total) by mouth daily. 90 tablet 1    hydroCHLOROthiazide 12.5 MG Oral Tab Take 1 tablet (12.5 mg total) by mouth daily. 90 tablet 1    Spacer/Aero Chamber Mouthpiece Does not apply Misc To be used with inhaler as needed. 1 each 0    Biotin 03664 MCG Oral Tab Take 1 tablet by mouth daily.      hydrOXYzine Pamoate (VISTARIL) 25 MG Oral Cap Take 1 capsule (25 mg total) by mouth 3 (three) times daily as needed for Itching. 15 capsule 0    cephalexin 500 MG Oral Cap Take 1 capsule (500 mg total) by mouth 4 (four) times daily. 28 capsule 0    methylPREDNISolone (MEDROL) 4 MG Oral Tablet Therapy Pack As directed. 1 each 0    cyclobenzaprine 5 MG Oral Tab Take 1 tablet (5 mg total) by mouth 3 (three) times daily as needed for Muscle spasms. 30 tablet 0     Hydroquinone 4 % External Cream Apply 1 Application. topically 2 (two) times daily. 30 g 0    HYDROcodone-acetaminophen 5-325 MG Oral Tab Take 1 tablet by mouth every 6 (six) hours as needed for Pain. 30 tablet 0    levocetirizine 5 MG Oral Tab Take 1 tablet (5 mg total) by mouth every evening. 90 tablet 1    Fexofenadine-Pseudoephed ER  MG Oral Tablet 12 Hr Take 1 tablet by mouth 2 (two) times daily. (Patient taking differently: Take 1 tablet by mouth 2 (two) times daily as needed.) 30 tablet 0     Allergies:No Known Allergies    Past Medical History:    Abnormal uterine bleeding    IUD    Amenorrhea    IUD    Asthma (HCC)    Astigmatism    high     Astigmatism, irregular    OU    High blood pressure    High myopia    History of pregnancy    EAB x2 1985, 1997 no comp    Hypertension    Losartian    Infertility, female    Iritis    9/20/12    Keratoconus    2018- referred to Dr. Joselo Carrion     Lipid screening    RUPAL (obstructive sleep apnea)    AHI 11.2 Supine AHI 14 non-supine AHI 8.5 Sao2 Vishnu 81%    Presbyopia    Sleep apnea    Tuberculosis contact    took INH medication      Past Surgical History:   Procedure Laterality Date    Cataract extraction w/  intraocular lens implant Right 8/31/15    [facility] with [facility] Blue and Shugarcaine    Cataract extraction w/  intraocular lens implant Left 9/14/15    [facility] with [facility] and Shugarcaine    Colon surgery  2016    routine colonoscopy    Colonoscopy N/A 1/13/2017    Procedure: COLONOSCOPY;  Surgeon: Domingo Guerrero MD;  Location: [facility] ENDOSCOPY    Colonoscopy N/A 6/24/2022    Procedure: COLONOSCOPY;  Surgeon: Domingo Guerrero MD;  Location: [facility] ENDOSCOPY    D & c      D & c  2013    Eye surgery Right 01/2021    CXL with Dr. Joselo Weinstein     Yag capsulotomy - od - right eye Right 5/17/17    [facility]    Yag capsulotomy - os - left eye Left 5/31/17    [facility]      Family History   Problem Relation Age of Onset    Heart Disease Father     Seizure Disorder Mother      Cataracts Maternal Grandmother     Diabetes Neg     Glaucoma Neg     Macular degeneration Neg       Social History:   Social History     Socioeconomic History    Marital status:    Tobacco Use    Smoking status: Never    Smokeless tobacco: Never   Vaping Use    Vaping status: Never Used   Substance and Sexual Activity    Alcohol use: No    Drug use: No    Sexual activity: Yes     Partners: Male   Other Topics Concern    Caffeine Concern Yes     Comment: coffee 1 cup        Objective:   Vitals:    08/14/24 1213   BP: 110/80   Pulse:    Resp:    Temp:        Physical Exam  Constitutional:       Appearance: Normal appearance.   HENT:      Head: Normocephalic and atraumatic.      Right Ear: Tympanic membrane normal.      Left Ear: Tympanic membrane normal.      Nose: Nose normal.      Mouth/Throat:      Mouth: Mucous membranes are moist.   Neck:      Thyroid: No thyromegaly.   Cardiovascular:      Rate and Rhythm: Normal rate and regular rhythm.      Heart sounds:      No gallop.   Pulmonary:      Effort: Pulmonary effort is normal.      Breath sounds: Normal breath sounds.   Neurological:      Mental Status: She is alert.   Psychiatric:         Mood and Affect: Mood normal.         Assessment & Plan:   1. Primary hypertension  Blood pressure measures to goal.  Compliance with medication emphasized.  Patient encouraged to continue with dietary disciplines and increasing her physical exercise which has resulted in a notable weight loss.    2. Loose stools  Ordered.  - Stool Culture [E]; Future  - WBC, Stool [E]; Future  - Norovirus PCR, Stool [E]; Future  - Ova and Parasites (non-traveler) [E]; Future    3. Class 3 severe obesity with serious comorbidity and body mass index (BMI) of 50.0 to 59.9 in adult, unspecified obesity type (HCC)  See #1.  See patient instructions.    4. Mild intermittent asthma without complication (HCC)  Currently asymptomatic.    5. LVH (left ventricular hypertrophy) due to hypertensive  disease, without heart failure  This is as a result of #1, however blood pressure is much better controlled and efforts concerning decrease salt intake and continued weight loss emphasized and encouraged.    6. Keratoconus of both eyes  Patient needs referral to subspecialist concerning this condition.  - Ophthalmology Referral - In Network      Orders Placed This Encounter   Procedures    Norovirus PCR, Stool [E]    Stool Culture [E]    WBC, Stool [E]    Ova and Parasites (non-traveler) [E]       Meds This Visit:  Requested Prescriptions      No prescriptions requested or ordered in this encounter       Imaging & Referrals:  OPHTHALMOLOGY - INTERNAL     Patient Instructions   Medication reviewed and renewed where needed and appropriate.  Recommend weight loss via daily exercising and consistent healthy dietary changes.  Monitor blood pressures and record at home. Limit salt intake.  Comply with medications.  Consider FiberCon.  Continue with B 12 and calcium supplement    Return if symptoms worsen or fail to improve.

## 2024-09-03 ENCOUNTER — LAB ENCOUNTER (OUTPATIENT)
Dept: LAB | Age: 59
End: 2024-09-03
Attending: FAMILY MEDICINE
Payer: COMMERCIAL

## 2024-09-03 ENCOUNTER — OFFICE VISIT (OUTPATIENT)
Dept: FAMILY MEDICINE CLINIC | Facility: CLINIC | Age: 59
End: 2024-09-03
Payer: COMMERCIAL

## 2024-09-03 VITALS
TEMPERATURE: 98 F | RESPIRATION RATE: 18 BRPM | SYSTOLIC BLOOD PRESSURE: 128 MMHG | WEIGHT: 293 LBS | HEART RATE: 53 BPM | BODY MASS INDEX: 55 KG/M2 | DIASTOLIC BLOOD PRESSURE: 78 MMHG | OXYGEN SATURATION: 97 %

## 2024-09-03 DIAGNOSIS — Z01.812 ENCOUNTER FOR PRE-OPERATIVE LABORATORY TESTING: ICD-10-CM

## 2024-09-03 DIAGNOSIS — I10 PRIMARY HYPERTENSION: ICD-10-CM

## 2024-09-03 DIAGNOSIS — E66.01 CLASS 3 SEVERE OBESITY WITH SERIOUS COMORBIDITY AND BODY MASS INDEX (BMI) OF 50.0 TO 59.9 IN ADULT, UNSPECIFIED OBESITY TYPE (HCC): ICD-10-CM

## 2024-09-03 DIAGNOSIS — M89.8X7 EXOSTOSIS OF RIGHT FOOT: ICD-10-CM

## 2024-09-03 DIAGNOSIS — I11.9 LVH (LEFT VENTRICULAR HYPERTROPHY) DUE TO HYPERTENSIVE DISEASE, WITHOUT HEART FAILURE: ICD-10-CM

## 2024-09-03 DIAGNOSIS — Z01.818 PRE-OP EXAMINATION: Primary | ICD-10-CM

## 2024-09-03 DIAGNOSIS — M79.671 RIGHT FOOT PAIN: ICD-10-CM

## 2024-09-03 LAB
ANION GAP SERPL CALC-SCNC: 8 MMOL/L (ref 0–18)
BASOPHILS # BLD AUTO: 0.08 X10(3) UL (ref 0–0.2)
BASOPHILS NFR BLD AUTO: 0.9 %
BUN BLD-MCNC: 16 MG/DL (ref 9–23)
BUN/CREAT SERPL: 20.8 (ref 10–20)
CALCIUM BLD-MCNC: 8.8 MG/DL (ref 8.7–10.4)
CHLORIDE SERPL-SCNC: 106 MMOL/L (ref 98–112)
CO2 SERPL-SCNC: 26 MMOL/L (ref 21–32)
CREAT BLD-MCNC: 0.77 MG/DL
DEPRECATED RDW RBC AUTO: 47.8 FL (ref 35.1–46.3)
EGFRCR SERPLBLD CKD-EPI 2021: 89 ML/MIN/1.73M2 (ref 60–?)
EOSINOPHIL # BLD AUTO: 0.13 X10(3) UL (ref 0–0.7)
EOSINOPHIL NFR BLD AUTO: 1.4 %
ERYTHROCYTE [DISTWIDTH] IN BLOOD BY AUTOMATED COUNT: 15.4 % (ref 11–15)
FASTING STATUS PATIENT QL REPORTED: YES
GLUCOSE BLD-MCNC: 96 MG/DL (ref 70–99)
HCT VFR BLD AUTO: 36.6 %
HGB BLD-MCNC: 12.4 G/DL
IMM GRANULOCYTES # BLD AUTO: 0.02 X10(3) UL (ref 0–1)
IMM GRANULOCYTES NFR BLD: 0.2 %
LYMPHOCYTES # BLD AUTO: 3.1 X10(3) UL (ref 1–4)
LYMPHOCYTES NFR BLD AUTO: 33.1 %
MCH RBC QN AUTO: 28.9 PG (ref 26–34)
MCHC RBC AUTO-ENTMCNC: 33.9 G/DL (ref 31–37)
MCV RBC AUTO: 85.3 FL
MONOCYTES # BLD AUTO: 0.69 X10(3) UL (ref 0.1–1)
MONOCYTES NFR BLD AUTO: 7.4 %
NEUTROPHILS # BLD AUTO: 5.34 X10 (3) UL (ref 1.5–7.7)
NEUTROPHILS # BLD AUTO: 5.34 X10(3) UL (ref 1.5–7.7)
NEUTROPHILS NFR BLD AUTO: 57 %
OSMOLALITY SERPL CALC.SUM OF ELEC: 291 MOSM/KG (ref 275–295)
PLATELET # BLD AUTO: 264 10(3)UL (ref 150–450)
POTASSIUM SERPL-SCNC: 3.5 MMOL/L (ref 3.5–5.1)
RBC # BLD AUTO: 4.29 X10(6)UL
SODIUM SERPL-SCNC: 140 MMOL/L (ref 136–145)
WBC # BLD AUTO: 9.4 X10(3) UL (ref 4–11)

## 2024-09-03 PROCEDURE — 3074F SYST BP LT 130 MM HG: CPT | Performed by: FAMILY MEDICINE

## 2024-09-03 PROCEDURE — 80048 BASIC METABOLIC PNL TOTAL CA: CPT

## 2024-09-03 PROCEDURE — 36415 COLL VENOUS BLD VENIPUNCTURE: CPT

## 2024-09-03 PROCEDURE — 3078F DIAST BP <80 MM HG: CPT | Performed by: FAMILY MEDICINE

## 2024-09-03 PROCEDURE — 99214 OFFICE O/P EST MOD 30 MIN: CPT | Performed by: FAMILY MEDICINE

## 2024-09-03 PROCEDURE — 85025 COMPLETE CBC W/AUTO DIFF WBC: CPT

## 2024-09-03 NOTE — PROGRESS NOTES
Subjective:     Patient ID: Sofie Tyler is a 59 year old female.    This patient is a well-established morbidly obese/currently asymptomatic asthmatic/hypertensive -American female who originally set up this appointment for preoperative testing and clearance for exostosis of the right foot.  Patient's symptoms have improved.  At this time she wants to continue with her workout as she is being diligent about her increased physical activity in a safe manner and also actively decreasing her dietary caloric intake.    Patient denies headaches, chest pain, dizziness, shortness of breath, acute visual changes, and/or exertional fatigue.    Patient's care gaps are satisfied at the moment.        History/Other:   Review of Systems  Current Outpatient Medications   Medication Sig Dispense Refill    losartan 50 MG Oral Tab Take 1 tablet (50 mg total) by mouth daily. 90 tablet 1    hydroCHLOROthiazide 12.5 MG Oral Tab Take 1 tablet (12.5 mg total) by mouth daily. 90 tablet 1    Biotin 20694 MCG Oral Tab Take 1 tablet by mouth daily.      hydrOXYzine Pamoate (VISTARIL) 25 MG Oral Cap Take 1 capsule (25 mg total) by mouth 3 (three) times daily as needed for Itching. 15 capsule 0    cephalexin 500 MG Oral Cap Take 1 capsule (500 mg total) by mouth 4 (four) times daily. 28 capsule 0    methylPREDNISolone (MEDROL) 4 MG Oral Tablet Therapy Pack As directed. 1 each 0    cyclobenzaprine 5 MG Oral Tab Take 1 tablet (5 mg total) by mouth 3 (three) times daily as needed for Muscle spasms. 30 tablet 0    Hydroquinone 4 % External Cream Apply 1 Application. topically 2 (two) times daily. 30 g 0    HYDROcodone-acetaminophen 5-325 MG Oral Tab Take 1 tablet by mouth every 6 (six) hours as needed for Pain. 30 tablet 0    levocetirizine 5 MG Oral Tab Take 1 tablet (5 mg total) by mouth every evening. 90 tablet 1    Fexofenadine-Pseudoephed ER  MG Oral Tablet 12 Hr Take 1 tablet by mouth 2 (two) times daily. (Patient  taking differently: Take 1 tablet by mouth 2 (two) times daily as needed.) 30 tablet 0    Spacer/Aero Chamber Mouthpiece Does not apply Misc To be used with inhaler as needed. 1 each 0     Allergies:No Known Allergies    Past Medical History:    Abnormal uterine bleeding    IUD    Amenorrhea    IUD    Asthma (HCC)    Astigmatism    high     Astigmatism, irregular    OU    High blood pressure    High myopia    History of pregnancy    EAB x2 1985, 1997 no comp    Hypertension    Losartian    Infertility, female    Iritis    9/20/12    Keratoconus    2018- referred to Dr. Joselo Carrion     Lipid screening    RUPAL (obstructive sleep apnea)    AHI 11.2 Supine AHI 14 non-supine AHI 8.5 Sao2 Vishnu 81%    Presbyopia    Sleep apnea    Tuberculosis contact    took INH medication      Past Surgical History:   Procedure Laterality Date    Cataract extraction w/  intraocular lens implant Right 8/31/15    [facility] with [facility] Blue and Shugarcaine    Cataract extraction w/  intraocular lens implant Left 9/14/15    [facility] with [facility] Blue and Shugarcaine    Colon surgery  2016    routine colonoscopy    Colonoscopy N/A 1/13/2017    Procedure: COLONOSCOPY;  Surgeon: Domingo Guerrero MD;  Location: [facility] ENDOSCOPY    Colonoscopy N/A 6/24/2022    Procedure: COLONOSCOPY;  Surgeon: Domingo Guerrero MD;  Location: [facility] ENDOSCOPY    D & c      D & c  2013    Eye surgery Right 01/2021    CXL with Dr. Joselo Weinstein     Yag capsulotomy - od - right eye Right 5/17/17    [facility]    Yag capsulotomy - os - left eye Left 5/31/17    [facility]      Family History   Problem Relation Age of Onset    Heart Disease Father     Seizure Disorder Mother     Cataracts Maternal Grandmother     Diabetes Neg     Glaucoma Neg     Macular degeneration Neg       Social History:   Social History     Socioeconomic History    Marital status:    Tobacco Use    Smoking status: Never    Smokeless tobacco: Never   Vaping Use    Vaping status: Never Used   Substance and Sexual  Activity    Alcohol use: No    Drug use: No    Sexual activity: Yes     Partners: Male   Other Topics Concern    Caffeine Concern Yes     Comment: coffee 1 cup        Objective:   Vitals:    09/03/24 0901   BP: 124/71   Pulse: 53   Resp: 18   Temp: 97.8 °F (36.6 °C)       Physical Exam  Constitutional:       General: She is not in acute distress.     Appearance: She is obese. She is not ill-appearing.   Cardiovascular:      Rate and Rhythm: Normal rate and regular rhythm.      Heart sounds:      No gallop.   Pulmonary:      Effort: Pulmonary effort is normal.      Breath sounds: Normal breath sounds.   Musculoskeletal:        Feet:    Feet:      Comments: Soft tissue swelling as depicted.  Neurological:      Mental Status: She is alert.         Assessment & Plan:   1. Exostosis of right foot  Keep podiatry appointments.    2. Right foot pain  Secondary to number 1.    3. Pre-op examination  Hold for now.    4. Encounter for pre-operative laboratory testing  Hold for now.  - Basic Metabolic Panel (8) [E]; Future  - CBC W Differential W Platelet [E]; Future  - Urinalysis, Routine [E]; Future    5. Class 3 severe obesity with serious comorbidity and body mass index (BMI) of 50.0 to 59.9 in adult, unspecified obesity type (HCC)  Continue her weight loss efforts.    6. Primary hypertension  To goal.    7. LVH (left ventricular hypertrophy) due to hypertensive disease, without heart failure  Confirmed via echo. Continue with blood pressure management.      Orders Placed This Encounter   Procedures    Basic Metabolic Panel (8) [E]    CBC W Differential W Platelet [E]    Urinalysis, Routine [E]       Meds This Visit:  Requested Prescriptions      No prescriptions requested or ordered in this encounter       Imaging & Referrals:  None     Patient Instructions   Hold on pre-op test and exam secondary to improved or decreased foot pain.  Keep specialist appointments.  Patient requests influenza and RSV vaccine.  Medication  reviewed and renewed where needed and appropriate.  Comply with medications.  Keep all specialty appointments.    Return in about 3 months (around 12/3/2024).

## 2024-09-03 NOTE — PATIENT INSTRUCTIONS
Hold on pre-op test and exam secondary to improved or decreased foot pain.  Keep specialist appointments.  Patient requests influenza and RSV vaccine.  Medication reviewed and renewed where needed and appropriate.  Comply with medications.  Keep all specialty appointments.

## 2024-09-04 ENCOUNTER — LAB ENCOUNTER (OUTPATIENT)
Dept: LAB | Facility: HOSPITAL | Age: 59
End: 2024-09-04
Attending: FAMILY MEDICINE
Payer: COMMERCIAL

## 2024-09-04 DIAGNOSIS — Z01.812 ENCOUNTER FOR PRE-OPERATIVE LABORATORY TESTING: ICD-10-CM

## 2024-09-04 LAB
BILIRUB UR QL: NEGATIVE
CLARITY UR: CLEAR
GLUCOSE UR-MCNC: NORMAL MG/DL
HGB UR QL STRIP.AUTO: NEGATIVE
KETONES UR-MCNC: NEGATIVE MG/DL
LEUKOCYTE ESTERASE UR QL STRIP.AUTO: NEGATIVE
NITRITE UR QL STRIP.AUTO: NEGATIVE
PH UR: 5.5 [PH] (ref 5–8)
PROT UR-MCNC: NEGATIVE MG/DL
SP GR UR STRIP: 1.02 (ref 1–1.03)
UROBILINOGEN UR STRIP-ACNC: NORMAL

## 2024-09-04 PROCEDURE — 81003 URINALYSIS AUTO W/O SCOPE: CPT

## 2024-11-20 DIAGNOSIS — I10 PRIMARY HYPERTENSION: ICD-10-CM

## 2024-11-25 RX ORDER — LOSARTAN POTASSIUM 50 MG/1
50 TABLET ORAL DAILY
Qty: 90 TABLET | Refills: 3 | Status: SHIPPED | OUTPATIENT
Start: 2024-11-25

## 2024-11-25 RX ORDER — HYDROCHLOROTHIAZIDE 12.5 MG/1
12.5 TABLET ORAL DAILY
Qty: 90 TABLET | Refills: 3 | Status: SHIPPED | OUTPATIENT
Start: 2024-11-25

## 2024-11-25 NOTE — TELEPHONE ENCOUNTER
Refill passed per [facility] protocol.     Requested Prescriptions   Pending Prescriptions Disp Refills    HYDROCHLOROTHIAZIDE 12.5 MG Oral Tab [Pharmacy Med Name: HYDROCHLOROTHIAZIDE 12.5 MG TB] 90 tablet 1     Sig: TAKE 1 TABLET BY MOUTH EVERY DAY       Hypertension Medications Protocol Passed - 11/25/2024 10:30 AM        Passed - CMP or BMP in past 12 months        Passed - Last BP reading less than 140/90     BP Readings from Last 1 Encounters:   09/03/24 128/78               Passed - In person appointment or virtual visit in the past 12 mos or appointment in next 3 mos     Recent Outpatient Visits              2 months ago Pre-op examination    [facility] Kyle Maloney,     Office Visit    3 months ago Loose stools    [facility] Kyle Maloney,     Office Visit    6 months ago Class 3 severe obesity with serious comorbidity and body mass index (BMI) of 40.0 to 44.9 in adult, unspecified obesity type (HCC)    [facility] Kyle Maloney,     Office Visit    7 months ago Exostosis of right foot    [facility] Dylon Self DPM    Office Visit    9 months ago Encounter for screening mammogram for breast cancer    [facility] Kyle Maloney,     Office Visit          Future Appointments         Provider Department Appt Notes    In 2 weeks Kyle Maloney DO [facility] 3 month f/u                    Passed - EGFRCR or GFRAA > 50     GFR Evaluation  EGFRCR: 89 , resulted on 9/3/2024            LOSARTAN 50 MG Oral Tab [Pharmacy Med Name: LOSARTAN POTASSIUM 50 MG TAB] 90 tablet 1     Sig: TAKE 1 TABLET BY MOUTH EVERY DAY       Hypertension Medications Protocol Passed - 11/25/2024 10:30 AM        Passed - CMP or BMP in past  12 months        Passed - Last BP reading less than 140/90     BP Readings from Last 1 Encounters:   09/03/24 128/78               Passed - In person appointment or virtual visit in the past 12 mos or appointment in next 3 mos     Recent Outpatient Visits              2 months ago Pre-op examination    [facility] Kyle Maloney, DO    Office Visit    3 months ago Loose stools    [facility] Kyle Maloney, DO    Office Visit    6 months ago Class 3 severe obesity with serious comorbidity and body mass index (BMI) of 40.0 to 44.9 in adult, unspecified obesity type (HCC)    [facility] Kyle Maloney,     Office Visit    7 months ago [facility]    Office Visit    9 months ago Encounter for screening mammogram for breast cancer    [facility] Kyle Maloney, DO    Office Visit          Future Appointments         Provider Department Appt Notes    In 2 weeks Kyle Maloney,  [facility] 3 month f/u                    Passed - EGFRCR or GFRAA > 50     GFR Evaluation  EGFRCR: 89 , resulted on 9/3/2024               [unfilled]      @[email or web address]@

## 2024-12-14 ENCOUNTER — OFFICE VISIT (OUTPATIENT)
Dept: FAMILY MEDICINE CLINIC | Facility: CLINIC | Age: 59
End: 2024-12-14
Payer: COMMERCIAL

## 2024-12-14 VITALS
WEIGHT: 293 LBS | BODY MASS INDEX: 45.99 KG/M2 | RESPIRATION RATE: 18 BRPM | HEART RATE: 55 BPM | SYSTOLIC BLOOD PRESSURE: 130 MMHG | OXYGEN SATURATION: 98 % | TEMPERATURE: 98 F | DIASTOLIC BLOOD PRESSURE: 78 MMHG | HEIGHT: 67 IN

## 2024-12-14 DIAGNOSIS — E66.01 CLASS 3 SEVERE OBESITY WITH SERIOUS COMORBIDITY AND BODY MASS INDEX (BMI) OF 50.0 TO 59.9 IN ADULT, UNSPECIFIED OBESITY TYPE (HCC): ICD-10-CM

## 2024-12-14 DIAGNOSIS — I10 PRIMARY HYPERTENSION: ICD-10-CM

## 2024-12-14 DIAGNOSIS — E66.813 CLASS 3 SEVERE OBESITY WITH SERIOUS COMORBIDITY AND BODY MASS INDEX (BMI) OF 50.0 TO 59.9 IN ADULT, UNSPECIFIED OBESITY TYPE (HCC): ICD-10-CM

## 2024-12-14 DIAGNOSIS — H18.603 KERATOCONUS OF BOTH EYES: ICD-10-CM

## 2024-12-14 DIAGNOSIS — Z28.21 INFLUENZA VACCINATION DECLINED BY PATIENT: Primary | ICD-10-CM

## 2024-12-14 DIAGNOSIS — I11.9 LVH (LEFT VENTRICULAR HYPERTROPHY) DUE TO HYPERTENSIVE DISEASE, WITHOUT HEART FAILURE: ICD-10-CM

## 2024-12-14 PROCEDURE — 3008F BODY MASS INDEX DOCD: CPT | Performed by: FAMILY MEDICINE

## 2024-12-14 PROCEDURE — 3075F SYST BP GE 130 - 139MM HG: CPT | Performed by: FAMILY MEDICINE

## 2024-12-14 PROCEDURE — 3078F DIAST BP <80 MM HG: CPT | Performed by: FAMILY MEDICINE

## 2024-12-14 PROCEDURE — 99214 OFFICE O/P EST MOD 30 MIN: CPT | Performed by: FAMILY MEDICINE

## 2024-12-14 NOTE — PROGRESS NOTES
Subjective:     Patient ID: Sofie Tyler is a 59 year old female.    This patient is a 59-year-old well-established -American female who is here to follow-up on her hypertensive status.  Patient compliant with medications and she has lost a significant amount of weight with the dietary disciplines and increase physical activity.  Patient compliant with antihypertensive medication.  Patient denies headaches, chest pain, dizziness, shortness of breath, acute visual changes (sees ophthalmology for bilateral keratoconus), and/or exertional fatigue.    Patient declines on seasonal influenza vaccine-already received for the season.    Patient needs referral update to see her eye specialist that she generated.        History/Other:   Review of Systems  Current Outpatient Medications   Medication Sig Dispense Refill    hydroCHLOROthiazide 12.5 MG Oral Tab Take 1 tablet (12.5 mg total) by mouth daily. 90 tablet 3    losartan 50 MG Oral Tab Take 1 tablet (50 mg total) by mouth daily. 90 tablet 3    Spacer/Aero Chamber Mouthpiece Does not apply Misc To be used with inhaler as needed. 1 each 0    Biotin 65242 MCG Oral Tab Take 1 tablet by mouth daily.      hydrOXYzine Pamoate (VISTARIL) 25 MG Oral Cap Take 1 capsule (25 mg total) by mouth 3 (three) times daily as needed for Itching. 15 capsule 0    cephalexin 500 MG Oral Cap Take 1 capsule (500 mg total) by mouth 4 (four) times daily. 28 capsule 0    methylPREDNISolone (MEDROL) 4 MG Oral Tablet Therapy Pack As directed. 1 each 0    cyclobenzaprine 5 MG Oral Tab Take 1 tablet (5 mg total) by mouth 3 (three) times daily as needed for Muscle spasms. 30 tablet 0    Hydroquinone 4 % External Cream Apply 1 Application. topically 2 (two) times daily. 30 g 0    HYDROcodone-acetaminophen 5-325 MG Oral Tab Take 1 tablet by mouth every 6 (six) hours as needed for Pain. 30 tablet 0    levocetirizine 5 MG Oral Tab Take 1 tablet (5 mg total) by mouth every evening. 90  tablet 1    Fexofenadine-Pseudoephed ER  MG Oral Tablet 12 Hr Take 1 tablet by mouth 2 (two) times daily. (Patient taking differently: Take 1 tablet by mouth 2 (two) times daily as needed.) 30 tablet 0     Allergies:Allergies[1]    Past Medical History:    Abnormal uterine bleeding    IUD    Amenorrhea    IUD    Asthma (HCC)    Astigmatism    high     Astigmatism, irregular    OU    High blood pressure    High myopia    History of pregnancy    EAB x2 1985, 1997 no comp    Hypertension    Losartian    Infertility, female    Iritis    9/20/12    Keratoconus    2018- referred to Dr. Joselo Carrion     Lipid screening    RUPAL (obstructive sleep apnea)    AHI 11.2 Supine AHI 14 non-supine AHI 8.5 Sao2 Vishnu 81%    Presbyopia    Sleep apnea    Tuberculosis contact    took INH medication      Past Surgical History:   Procedure Laterality Date    Cataract extraction w/  intraocular lens implant Right 8/31/15    [facility] with [facility]    Cataract extraction w/  intraocular lens implant Left 9/14/15    [facility] with [facility]    Colon surgery  2016    routine colonoscopy    Colonoscopy N/A 1/13/2017    Procedure: COLONOSCOPY;  Surgeon: Domingo Guerrero MD;  Location: [facility] ENDOSCOPY    Colonoscopy N/A 6/24/2022    Procedure: COLONOSCOPY;  Surgeon: Domingo Guerrero MD;  Location: [facility] ENDOSCOPY    D & c      D & c  2013    Eye surgery Right 01/2021    CXL with Dr. Joselo Weinstein     Yag capsulotomy - od - right eye Right 5/17/17    [facility]    Yag capsulotomy - os - left eye Left 5/31/17    [facility]      Family History   Problem Relation Age of Onset    Heart Disease Father     Seizure Disorder Mother     Cataracts Maternal Grandmother     Diabetes Neg     Glaucoma Neg     Macular degeneration Neg       Social History:   Social History     Socioeconomic History    Marital status:    Tobacco Use    Smoking status: Never    Smokeless tobacco: Never   Vaping Use    Vaping status: Never Used   Substance  and Sexual Activity    Alcohol use: No    Drug use: No    Sexual activity: Yes     Partners: Male   Other Topics Concern    Caffeine Concern Yes     Comment: coffee 1 cup        Objective:   Vitals:    12/14/24 1125   BP: 130/78   Pulse:    Resp:    Temp:        Physical Exam  Constitutional:       General: She is not in acute distress.     Appearance: She is obese. She is not ill-appearing.   HENT:      Head: Normocephalic and atraumatic.   Neck:      Thyroid: No thyromegaly.   Cardiovascular:      Rate and Rhythm: Normal rate and regular rhythm.      Heart sounds:      No gallop.   Pulmonary:      Effort: Pulmonary effort is normal.      Breath sounds: Normal breath sounds.   Neurological:      Mental Status: She is alert and oriented to person, place, and time.         Assessment & Plan:   1. Primary hypertension  Blood pressure measures to goal when measured manually on today.  Compliance emphasized and encouraged.    2. LVH (left ventricular hypertrophy) due to hypertensive disease, without heart failure  Status is unchanged.  Good blood pressure control is the mainstay regarding this condition and hopefully prevents progression.    3. Keratoconus of both eyes  Referred for follow-up.  - Ophthalmology Referral - In Network    4. Class 3 severe obesity with serious comorbidity and body mass index (BMI) of 50.0 to 59.9 in adult, unspecified obesity type (HCC)  Continue with weight loss efforts.    5. Influenza vaccination declined by patient  Patient already received seasonal vaccine for the flu.  - Fluzone trivalent vaccine, PF 0.5mL, 6mo+ (55740)      Orders Placed This Encounter   Procedures    Fluzone trivalent vaccine, PF 0.5mL, 6mo+ (58706)       Meds This Visit:  Requested Prescriptions      No prescriptions requested or ordered in this encounter       Imaging & Referrals:  INFLUENZA VACCINE, TRI, PRESERV FREE, 0.5 ML  OPHTHALMOLOGY - INTERNAL     Patient Instructions   All adult screening ordered and done  appropriate for patient's age and gender and risk factors and complaints.  Recommend weight loss via daily exercising and consistent healthy dietary changes.  Encouraged physical fitness and daily physical activity daily.  Medication reviewed and renewed where needed and appropriate.  Comply with medications.    Return in about 6 months (around 6/14/2025), or if symptoms worsen or fail to improve.         [1] No Known Allergies

## 2024-12-14 NOTE — PATIENT INSTRUCTIONS
All adult screening ordered and done appropriate for patient's age and gender and risk factors and complaints.  Recommend weight loss via daily exercising and consistent healthy dietary changes.  Encouraged physical fitness and daily physical activity daily.  Medication reviewed and renewed where needed and appropriate.  Comply with medications.

## 2025-01-02 ENCOUNTER — TELEPHONE (OUTPATIENT)
Dept: INTERNAL MEDICINE CLINIC | Facility: CLINIC | Age: 60
End: 2025-01-02

## 2025-01-02 DIAGNOSIS — H18.609 KERATOCONUS, UNSPECIFIED LATERALITY: Primary | ICD-10-CM

## 2025-01-02 NOTE — TELEPHONE ENCOUNTER
Patient is requesting referral.     Name of specialist and specialty department : Dr. Marilyn Carlisle, ophthalmology  Reason for visit with the specialist: keratoconus  Address of the specialist office: [address] [place] [address], [place]  [address]   Appointment date: 03/21/25       Asking to be mailed to address  on file.     CSS informed patient the turnaround time for referral is 5-7 business days.  Patient was informed to check their [employer] account for referral status.

## 2025-01-04 NOTE — TELEPHONE ENCOUNTER
Referral has been reviewed and completed and signed and placed on the chart.  Please let all pertinent parties know.  Thank you.

## 2025-01-09 ENCOUNTER — TELEPHONE (OUTPATIENT)
Dept: FAMILY MEDICINE CLINIC | Facility: CLINIC | Age: 60
End: 2025-01-09

## 2025-01-09 DIAGNOSIS — H18.603 KERATOCONUS OF BOTH EYES: Primary | ICD-10-CM

## 2025-01-09 NOTE — TELEPHONE ENCOUNTER
Patient is requesting referral.     Name of specialist and specialty department : Dr.Charles Simms  Reason for visit with the specialist: Keratoconus on both eyes   Address of the specialist office: [address] [place] [id number]  Appointment date: 1/23/25         CSS informed patient the turnaround time for referral is 5-7 business days.  [facility] for [facility].

## 2025-01-09 NOTE — TELEPHONE ENCOUNTER
Patient would like a referral for Dr.Charles Simms mailed to her home. Please advise       See telephone encounter 1/2/25

## 2025-01-17 NOTE — TELEPHONE ENCOUNTER
Referral has been reviewed and completed and signed and placed on the chart.  Please mail the referral to the patient's home per her request.

## 2025-01-23 ENCOUNTER — TELEPHONE (OUTPATIENT)
Dept: FAMILY MEDICINE CLINIC | Facility: CLINIC | Age: 60
End: 2025-01-23

## 2025-01-23 NOTE — TELEPHONE ENCOUNTER
Patient stopped in the [place] Office. She saw Dr. Simms today at [place], he is treating her for Keratoconus. He recommended for her to start taking Zepbound for sleep apnea, she states it was recently diagnosed. Please let patient know if Dr. Maloney is in agreement and if he can prescribe it for her. Patient is aware Dr. Maloney is out of the office until next week.

## 2025-01-30 NOTE — TELEPHONE ENCOUNTER
I am able to prescribe this medication, however the patient will need a formal appointment so that we can discuss all the reasoning and intention behind doing so.  If the patient does not already have a scheduled appointment, please establish 1.  This is not urgent and can be a regularly scheduled appointment.  Thank you.

## 2025-04-25 ENCOUNTER — TELEPHONE (OUTPATIENT)
Dept: CASE MANAGEMENT | Age: 60
End: 2025-04-25

## 2025-04-25 DIAGNOSIS — Z12.31 SCREENING MAMMOGRAM FOR BREAST CANCER: Primary | ICD-10-CM

## 2025-04-25 NOTE — TELEPHONE ENCOUNTER
Mammogram order has been reviewed and signed and placed on the chart.  Please call the patient and let her know.

## 2025-06-14 ENCOUNTER — HOSPITAL ENCOUNTER (OUTPATIENT)
Dept: MAMMOGRAPHY | Facility: HOSPITAL | Age: 60
Discharge: HOME | End: 2025-06-14
Attending: FAMILY MEDICINE
Payer: COMMERCIAL

## 2025-06-14 DIAGNOSIS — Z12.31 SCREENING MAMMOGRAM FOR BREAST CANCER: ICD-10-CM

## 2025-06-14 PROCEDURE — 77067 SCR MAMMO BI INCL CAD: CPT | Performed by: FAMILY MEDICINE

## 2025-06-14 PROCEDURE — 77063 BREAST TOMOSYNTHESIS BI: CPT | Performed by: FAMILY MEDICINE

## 2025-06-19 ENCOUNTER — LAB ENCOUNTER (OUTPATIENT)
Dept: LAB | Age: 60
End: 2025-06-19
Attending: FAMILY MEDICINE
Payer: COMMERCIAL

## 2025-06-19 ENCOUNTER — HOSPITAL ENCOUNTER (OUTPATIENT)
Dept: ULTRASOUND IMAGING | Age: 60
Discharge: HOME | End: 2025-06-19
Attending: FAMILY MEDICINE
Payer: COMMERCIAL

## 2025-06-19 ENCOUNTER — OFFICE VISIT (OUTPATIENT)
Dept: FAMILY MEDICINE CLINIC | Facility: CLINIC | Age: 60
End: 2025-06-19
Payer: COMMERCIAL

## 2025-06-19 VITALS
HEART RATE: 75 BPM | OXYGEN SATURATION: 99 % | DIASTOLIC BLOOD PRESSURE: 70 MMHG | BODY MASS INDEX: 45.99 KG/M2 | SYSTOLIC BLOOD PRESSURE: 112 MMHG | RESPIRATION RATE: 17 BRPM | HEIGHT: 67 IN | WEIGHT: 293 LBS

## 2025-06-19 DIAGNOSIS — H18.603 KERATOCONUS OF BOTH EYES: ICD-10-CM

## 2025-06-19 DIAGNOSIS — Z00.00 ROUTINE PHYSICAL EXAMINATION: Primary | ICD-10-CM

## 2025-06-19 DIAGNOSIS — I10 PRIMARY HYPERTENSION: ICD-10-CM

## 2025-06-19 DIAGNOSIS — J45.20 MILD INTERMITTENT ASTHMA WITHOUT COMPLICATION (HCC): ICD-10-CM

## 2025-06-19 DIAGNOSIS — Z00.00 ROUTINE PHYSICAL EXAMINATION: ICD-10-CM

## 2025-06-19 DIAGNOSIS — R60.0 BILATERAL LEG EDEMA: ICD-10-CM

## 2025-06-19 DIAGNOSIS — R09.89 BRUIT OF LEFT CAROTID ARTERY: ICD-10-CM

## 2025-06-19 DIAGNOSIS — I11.9 LVH (LEFT VENTRICULAR HYPERTROPHY) DUE TO HYPERTENSIVE DISEASE, WITHOUT HEART FAILURE: ICD-10-CM

## 2025-06-19 DIAGNOSIS — E66.813 CLASS 3 SEVERE OBESITY WITH SERIOUS COMORBIDITY AND BODY MASS INDEX (BMI) OF 45.0 TO 49.9 IN ADULT, UNSPECIFIED OBESITY TYPE: ICD-10-CM

## 2025-06-19 LAB
ALBUMIN SERPL-MCNC: 4.4 G/DL (ref 3.2–4.8)
ALBUMIN/GLOB SERPL: 1.5 {RATIO} (ref 1–2)
ALP LIVER SERPL-CCNC: 107 U/L (ref 46–118)
ALT SERPL-CCNC: 13 U/L (ref 10–49)
ANION GAP SERPL CALC-SCNC: 6 MMOL/L (ref 0–18)
AST SERPL-CCNC: 20 U/L (ref ?–34)
BASOPHILS # BLD AUTO: 0.08 X10(3) UL (ref 0–0.2)
BASOPHILS NFR BLD AUTO: 0.7 %
BILIRUB SERPL-MCNC: 0.3 MG/DL (ref 0.3–1.2)
BILIRUB UR QL: NEGATIVE
BUN BLD-MCNC: 25 MG/DL (ref 9–23)
BUN/CREAT SERPL: 27.8 (ref 10–20)
CALCIUM BLD-MCNC: 8.5 MG/DL (ref 8.7–10.4)
CHLORIDE SERPL-SCNC: 105 MMOL/L (ref 98–112)
CHOLEST SERPL-MCNC: 176 MG/DL (ref ?–200)
CLARITY UR: CLEAR
CO2 SERPL-SCNC: 28 MMOL/L (ref 21–32)
COLOR UR: YELLOW
CREAT BLD-MCNC: 0.9 MG/DL (ref 0.55–1.02)
DEPRECATED RDW RBC AUTO: 47.3 FL (ref 35.1–46.3)
EGFRCR SERPLBLD CKD-EPI 2021: 74 ML/MIN/1.73M2 (ref 60–?)
EOSINOPHIL # BLD AUTO: 0.17 X10(3) UL (ref 0–0.7)
EOSINOPHIL NFR BLD AUTO: 1.6 %
ERYTHROCYTE [DISTWIDTH] IN BLOOD BY AUTOMATED COUNT: 14.8 % (ref 11–15)
FASTING PATIENT LIPID ANSWER: YES
FASTING STATUS PATIENT QL REPORTED: YES
GLOBULIN PLAS-MCNC: 3 G/DL (ref 2–3.5)
GLUCOSE BLD-MCNC: 87 MG/DL (ref 70–99)
GLUCOSE UR-MCNC: NEGATIVE MG/DL
HCT VFR BLD AUTO: 38.6 % (ref 35–48)
HDLC SERPL-MCNC: 66 MG/DL (ref 40–59)
HGB BLD-MCNC: 12.7 G/DL (ref 12–16)
HGB UR QL STRIP.AUTO: NEGATIVE
IMM GRANULOCYTES # BLD AUTO: 0.03 X10(3) UL (ref 0–1)
IMM GRANULOCYTES NFR BLD: 0.3 %
KETONES UR-MCNC: NEGATIVE MG/DL
LDLC SERPL CALC-MCNC: 94 MG/DL (ref ?–100)
LEUKOCYTE ESTERASE UR QL STRIP.AUTO: NEGATIVE
LYMPHOCYTES # BLD AUTO: 3.85 X10(3) UL (ref 1–4)
LYMPHOCYTES NFR BLD AUTO: 35.6 %
MCH RBC QN AUTO: 28.8 PG (ref 26–34)
MCHC RBC AUTO-ENTMCNC: 32.9 G/DL (ref 31–37)
MCV RBC AUTO: 87.5 FL (ref 80–100)
MONOCYTES # BLD AUTO: 0.8 X10(3) UL (ref 0.1–1)
MONOCYTES NFR BLD AUTO: 7.4 %
NEUTROPHILS # BLD AUTO: 5.89 X10 (3) UL (ref 1.5–7.7)
NEUTROPHILS # BLD AUTO: 5.89 X10(3) UL (ref 1.5–7.7)
NEUTROPHILS NFR BLD AUTO: 54.4 %
NITRITE UR QL STRIP.AUTO: NEGATIVE
NONHDLC SERPL-MCNC: 110 MG/DL (ref ?–130)
OSMOLALITY SERPL CALC.SUM OF ELEC: 292 MOSM/KG (ref 275–295)
PH UR: 5.5 [PH] (ref 5–8)
PLATELET # BLD AUTO: 264 10(3)UL (ref 150–450)
POTASSIUM SERPL-SCNC: 3.7 MMOL/L (ref 3.5–5.1)
PROT SERPL-MCNC: 7.4 G/DL (ref 5.7–8.2)
PROT UR-MCNC: NEGATIVE MG/DL
RBC # BLD AUTO: 4.41 X10(6)UL (ref 3.8–5.3)
SODIUM SERPL-SCNC: 139 MMOL/L (ref 136–145)
SP GR UR STRIP: 1.02 (ref 1–1.03)
TRIGL SERPL-MCNC: 85 MG/DL (ref 30–149)
TSI SER-ACNC: 1.79 UIU/ML (ref 0.55–4.78)
UROBILINOGEN UR STRIP-ACNC: 0.2
VLDLC SERPL CALC-MCNC: 14 MG/DL (ref 0–30)
WBC # BLD AUTO: 10.8 X10(3) UL (ref 4–11)

## 2025-06-19 PROCEDURE — 81003 URINALYSIS AUTO W/O SCOPE: CPT

## 2025-06-19 PROCEDURE — 99213 OFFICE O/P EST LOW 20 MIN: CPT | Performed by: FAMILY MEDICINE

## 2025-06-19 PROCEDURE — 85025 COMPLETE CBC W/AUTO DIFF WBC: CPT

## 2025-06-19 PROCEDURE — 36415 COLL VENOUS BLD VENIPUNCTURE: CPT

## 2025-06-19 PROCEDURE — 99396 PREV VISIT EST AGE 40-64: CPT | Performed by: FAMILY MEDICINE

## 2025-06-19 PROCEDURE — 80053 COMPREHEN METABOLIC PANEL: CPT

## 2025-06-19 PROCEDURE — 93880 EXTRACRANIAL BILAT STUDY: CPT | Performed by: FAMILY MEDICINE

## 2025-06-19 PROCEDURE — 84443 ASSAY THYROID STIM HORMONE: CPT

## 2025-06-19 PROCEDURE — 80061 LIPID PANEL: CPT

## 2025-06-19 RX ORDER — FUROSEMIDE 20 MG/1
20 TABLET ORAL DAILY
Qty: 30 TABLET | Refills: 0 | Status: SHIPPED | OUTPATIENT
Start: 2025-06-19

## 2025-06-19 RX ORDER — POTASSIUM CHLORIDE 750 MG/1
10 TABLET, EXTENDED RELEASE ORAL DAILY
Qty: 30 TABLET | Refills: 0 | Status: SHIPPED | OUTPATIENT
Start: 2025-06-19

## 2025-06-19 NOTE — TELEPHONE ENCOUNTER
Dr. Maloney please advise pharmacy needs clarification on how many times a day patient is using medication   Kyle Maloney, DO Kyle Maloney, DO     Medication Detail    Medication Quantity Refills Start End   Sodium Chloride 0.9 % Inhalation Aero Soln 120 mL 3 6/19/2025 --   Sig:   Please apply topically as directed by ophthalmologist.        He has received the reminder to schedule his colonoscopy and will call

## 2025-06-19 NOTE — PATIENT INSTRUCTIONS
All adult screening ordered and done appropriate for patient's age and gender and risk factors and complaints.  Medication reviewed and renewed where needed and appropriate.  Comply with medications.  Monitor blood pressures and record at home. Limit salt intake.  Recommend weight loss via daily exercising and consistent healthy dietary changes.  Encouraged physical fitness and daily physical activity daily.  Keep ophthalmology appointments.  Patient has been instructed to take furosemide 20 mg and also potassium supplement daily for 3 to 5 days to reduce fluid retention.  Tablets are to be taken before 10 AM and the patient has been advised that the urination affect will mainly occur in the first 2 hours so that she can put herself in the convenient place to get to the restroom.  Patient has also been instructed to not take the hydrochlorothiazide when she is taking the furosemide and the potassium.  She can resume the hydrochlorothiazide once she stops taking the furosemide and the potassium.  Carotid ultrasound recommended and lieu of the patient's left carotid bruit.

## 2025-06-19 NOTE — PROGRESS NOTES
Subjective:     Patient ID: Sofie Tyler is a 60 year old female.    This patient is a well-established well-controlled hypertensive/mildly intermittent asthmatic who is currently asymptomatic/morbidly obese by definition -American female here for complete preventive care physical and for status update on any confirmed chronic medical illnesses and follow up on any previous labs or procedures that were suggestive or in need of further work up. Colonoscopy is current. Bowel and bladder functions are intact.    All of the patient's care gaps are currently satisfied.    Patient requesting a chamber for inhaler use.    Patient does give a recent complaint of fluid retention bilateral lower extremities left greater than the right.  We will diurese her with furosemide 20 mg along with 10 mEq of potassium to be taken in 3 to 5-day intervals and to be stopped once fluid retention has resolved.  Patient has been instructed to hold on her hydrochlorothiazide while she is on the furosemide.    Patient continues to have follow-ups with her ophthalmologist regarding keratoconus diagnosis.          History/Other:   Review of Systems  Current Medications[1]  Allergies:Allergies[2]    Past Medical History[3]   Past Surgical History[4]   Family History[5]   Social History: Short Social Hx on File[6]     Objective:   Vitals:    06/19/25 0922   BP: 112/70   Pulse: 75   Resp: 17       Physical Exam  Constitutional:       General: She is not in acute distress.     Appearance: She is obese. She is not ill-appearing.   HENT:      Right Ear: Tympanic membrane normal.      Left Ear: Tympanic membrane normal.      Nose: Nose normal.      Mouth/Throat:      Mouth: Mucous membranes are moist.   Neck:      Thyroid: No thyromegaly.   Cardiovascular:      Rate and Rhythm: Normal rate and regular rhythm.      Pulses:           Carotid pulses are  on the left side with bruit.     Heart sounds:      No gallop.   Pulmonary:       Effort: Pulmonary effort is normal.      Breath sounds: Normal breath sounds.   Abdominal:      General: Bowel sounds are normal. There is no distension.      Palpations: Abdomen is soft. There is no mass.   Neurological:      Mental Status: She is alert and oriented to person, place, and time.   Psychiatric:         Mood and Affect: Mood normal.         Assessment & Plan:   1. Routine physical examination  Well exam.  The following labs have been ordered.  - Lipid Panel [E]; Future  - CBC W Differential W Platelet [E]; Future  - Comp Metabolic Panel (14) [E]; Future  - TSH W Reflex To Free T4 [E]; Future  - Urinalysis, Routine [E]; Future    2. Keratoconus of both eyes  Refilled.  - Sodium Chloride 0.9 % Inhalation Aero Soln; Please apply topically as directed by ophthalmologist.  Dispense: 120 mL; Refill: 3    3. Bilateral leg edema  Prescribed.  See patient instructions.  - furosemide 20 MG Oral Tab; Take 1 tablet (20 mg total) by mouth daily. To be taken before 10:00 am  Dispense: 30 tablet; Refill: 0  - Potassium Chloride ER 10 MEQ Oral Tab CR; Take 1 tablet (10 mEq total) by mouth daily. To be taken with furosemide.  Dispense: 30 tablet; Refill: 0    4. Primary hypertension  See #5, same as 5.    5. LVH (left ventricular hypertrophy) due to hypertensive disease, without heart failure  Blood pressure measures to go and it is very good.  Continue to comply with medications for blood pressure.  Minimize salt intake.  Continue with increased physical exercise.    6. Mild intermittent asthma without complication (HCC)  Currently asymptomatic.  Chamber has been prescribed.    7. Class 3 severe obesity with serious comorbidity and body mass index (BMI) of 45.0 to 49.9 in adult, unspecified obesity type  Continue with current weight loss efforts with decreasing caloric intake on a daily basis as well as safe exercise 150 minutes/week.    8. Bruit of left carotid artery  Ordered.  - US CAROTID DOPPLER BILAT - DIAG IMG  (CPT=93880); Future      Orders Placed This Encounter   Procedures    Lipid Panel [E]    CBC W Differential W Platelet [E]    Comp Metabolic Panel (14) [E]    TSH W Reflex To Free T4 [E]    Urinalysis, Routine [E]       Meds This Visit:  Requested Prescriptions     Signed Prescriptions Disp Refills    Sodium Chloride 0.9 % Inhalation Aero Soln 120 mL 3     Sig: Please apply topically as directed by ophthalmologist.    furosemide 20 MG Oral Tab 30 tablet 0     Sig: Take 1 tablet (20 mg total) by mouth daily. To be taken before 10:00 am    Potassium Chloride ER 10 MEQ Oral Tab CR 30 tablet 0     Sig: Take 1 tablet (10 mEq total) by mouth daily. To be taken with furosemide.    Spacer/Aero-Holding Chambers Does not apply Device 1 each 0     Sig: Chamber to be used to enhance inhaler use.       Imaging & Referrals:  None     Patient Instructions   All adult screening ordered and done appropriate for patient's age and gender and risk factors and complaints.  Medication reviewed and renewed where needed and appropriate.  Comply with medications.  Monitor blood pressures and record at home. Limit salt intake.  Recommend weight loss via daily exercising and consistent healthy dietary changes.  Encouraged physical fitness and daily physical activity daily.  Keep ophthalmology appointments.  Patient has been instructed to take furosemide 20 mg and also potassium supplement daily for 3 to 5 days to reduce fluid retention.  Tablets are to be taken before 10 AM and the patient has been advised that the urination affect will mainly occur in the first 2 hours so that she can put herself in the convenient place to get to the restroom.  Patient has also been instructed to not take the hydrochlorothiazide when she is taking the furosemide and the potassium.  She can resume the hydrochlorothiazide once she stops taking the furosemide and the potassium.  Carotid ultrasound recommended and lieu of the patient's left carotid  bruit.    Return in about 1 year (around 6/19/2026), or if symptoms worsen or fail to improve.         [1]   Current Outpatient Medications   Medication Sig Dispense Refill    Sodium Chloride 0.9 % Inhalation Aero Soln Please apply topically as directed by ophthalmologist. 120 mL 3    furosemide 20 MG Oral Tab Take 1 tablet (20 mg total) by mouth daily. To be taken before 10:00 am 30 tablet 0    Potassium Chloride ER 10 MEQ Oral Tab CR Take 1 tablet (10 mEq total) by mouth daily. To be taken with furosemide. 30 tablet 0    Spacer/Aero-Holding Chambers Does not apply Device Chamber to be used to enhance inhaler use. 1 each 0    hydroCHLOROthiazide 12.5 MG Oral Tab Take 1 tablet (12.5 mg total) by mouth daily. 90 tablet 3    losartan 50 MG Oral Tab Take 1 tablet (50 mg total) by mouth daily. 90 tablet 3    Spacer/Aero Chamber Mouthpiece Does not apply Misc To be used with inhaler as needed. 1 each 0    Biotin 49648 MCG Oral Tab Take 1 tablet by mouth daily.      hydrOXYzine Pamoate (VISTARIL) 25 MG Oral Cap Take 1 capsule (25 mg total) by mouth 3 (three) times daily as needed for Itching. 15 capsule 0    methylPREDNISolone (MEDROL) 4 MG Oral Tablet Therapy Pack As directed. 1 each 0    Hydroquinone 4 % External Cream Apply 1 Application. topically 2 (two) times daily. 30 g 0    HYDROcodone-acetaminophen 5-325 MG Oral Tab Take 1 tablet by mouth every 6 (six) hours as needed for Pain. 30 tablet 0    levocetirizine 5 MG Oral Tab Take 1 tablet (5 mg total) by mouth every evening. 90 tablet 1    Fexofenadine-Pseudoephed ER  MG Oral Tablet 12 Hr Take 1 tablet by mouth 2 (two) times daily. (Patient taking differently: Take 1 tablet by mouth 2 (two) times daily as needed.) 30 tablet 0   [2] No Known Allergies  [3]   Past Medical History:   Abnormal uterine bleeding    IUD    Amenorrhea    IUD    Asthma (HCC)    Astigmatism    high     Astigmatism, irregular    OU    Essential hypertension    High blood pressure    High  myopia    History of pregnancy    EAB x2 1985, 1997 no comp    Hypertension    Losartian    Infertility, female    Iritis    9/20/12    Keratoconus    2018- referred to Dr. Joselo Carrion     Lipid screening    Obesity    RUPAL (obstructive sleep apnea)    AHI 11.2 Supine AHI 14 non-supine AHI 8.5 Sao2 Vishnu 81%    Osteoarthritis    Presbyopia    Sleep apnea    Tuberculosis contact    took INH medication    UTI (urinary tract infection)    Visual impairment   [4]   Past Surgical History:  Procedure Laterality Date    Cataract      Cataract extraction w/  intraocular lens implant Right 08/31/2015    [facility] with Vision Blue and Shugarcaine    Cataract extraction w/  intraocular lens implant Left 09/14/2015    [facility] with Vision Blue and Shugarcaine    Colon surgery  2016    routine colonoscopy    Colonoscopy N/A 01/13/2017    Procedure: COLONOSCOPY;  Surgeon: Domingo Guerrero MD;  Location: [facility] ENDOSCOPY    Colonoscopy N/A 06/24/2022    Procedure: COLONOSCOPY;  Surgeon: Domingo Guerrero MD;  Location: [facility] ENDOSCOPY    D & c      D & c  2013    Eye surgery Right 01/2021    CXL with Dr. Joselo Weinstein     Insert intrauterine device  2014, 2019    Remove intrauterine device  2019    Yag capsulotomy - od - right eye Right 05/17/2017    [facility]    Yag capsulotomy - os - left eye Left 05/31/2017    [facility]   [5]   Family History  Problem Relation Age of Onset    Seizure Disorder Mother     Heart Disease Father     Heart Disorder Father     Cataracts Maternal Grandmother     Diabetes Neg     Glaucoma Neg     Macular degeneration Neg     Breast Cancer Neg     Ovarian Cancer Neg     Pancreatic Cancer Neg    [6]   Social History  Socioeconomic History    Marital status:    Tobacco Use    Smoking status: Never    Smokeless tobacco: Never   Vaping Use    Vaping status: Never Used   Substance and Sexual Activity    Alcohol use: No    Drug use: No    Sexual activity: Yes     Partners: Male   Other Topics Concern    Caffeine Concern Yes      Comment: coffee 1 cup     Social Drivers of Health     Food Insecurity: No Food Insecurity (1/16/2025)    Received from [facility]    Hunger Vital Sign     Worried About Running Out of Food in the Last Year: Never true     Ran Out of Food in the Last Year: Never true   Transportation Needs: No Transportation Needs (1/16/2025)    Received from [facility]    PRAPARE - Transportation     Lack of Transportation (Medical): No     Lack of Transportation (Non-Medical): No   Housing Stability: Unknown (1/16/2025)    Received from [facility]    Housing Stability Vital Sign     Unable to Pay for Housing in the Last Year: No

## 2025-06-19 NOTE — TELEPHONE ENCOUNTER
Fax received from [facility] requesting clarification for Sodium Chloride 0.9%     Requesting how may times daily

## 2025-06-23 ENCOUNTER — TELEPHONE (OUTPATIENT)
Dept: FAMILY MEDICINE CLINIC | Facility: CLINIC | Age: 60
End: 2025-06-23

## 2025-06-23 NOTE — TELEPHONE ENCOUNTER
Spoke with pharmacist Juanito at [facility] and medication was dispensed to patient 6/22/2025

## 2025-06-23 NOTE — TELEPHONE ENCOUNTER
Gtts:  Genteal PF OU PRN  Last edited by Janee Spencer COT on 5/23/2025 3:52 PM.       Current Outpatient Medications (Ophthalmic Drugs)   Medication Sig   Carboxymethylcellulose Sodium (REFRESH PLUS OP) Administer into affected eye(s). PRN OU/PF   Oxygen Permeable Lens Products (Optimum Clean/Disinfect) 0.1-0.5 % solution     No current facility-administered medications for this visit. (Ophthalmic Drugs)     Current Outpatient Medications (Other)   Medication Sig   albuterol sulfate 108 (90 Base) MCG/ACT aerosol powder Inhale 2 puffs by mouth.   losartan (COZAAR) 50 MG tablet Take 1 tablet (50 mg total) by mouth.   losartan-hydroCHLOROthiazide (HYZAAR) 50-12.5 MG tablet Use in the mouth or throat.   ProAir RespiClick 108 (90 Base) MCG/ACT aerosol powder USE 2 PUFFS PO Q 4 H PRN   sodium chloride 0.9 % nebulizer solution USE AS DIRECTED FOR SCLERAL LENS DAILY

## 2025-06-23 NOTE — TELEPHONE ENCOUNTER
Per [facility], Sodium Chloride Inhalation Solution is not appropriate for use. [facility] is requesting a new Rx for an inhaler.

## 2025-06-28 DIAGNOSIS — E83.51 HYPOCALCEMIA: Primary | ICD-10-CM

## 2025-06-29 ENCOUNTER — LAB ENCOUNTER (OUTPATIENT)
Dept: LAB | Facility: HOSPITAL | Age: 60
End: 2025-06-29
Attending: FAMILY MEDICINE
Payer: COMMERCIAL

## 2025-06-29 DIAGNOSIS — E83.51 HYPOCALCEMIA: ICD-10-CM

## 2025-06-29 LAB
CALCIUM BLD-MCNC: 8.4 MG/DL (ref 8.7–10.4)
CREAT BLD-MCNC: 0.8 MG/DL (ref 0.55–1.02)
PHOSPHATE SERPL-MCNC: 3.4 MG/DL (ref 2.4–5.1)
PTH-INTACT SERPL-MCNC: 98 PG/ML (ref 18.5–88)

## 2025-06-29 PROCEDURE — 82310 ASSAY OF CALCIUM: CPT

## 2025-06-29 PROCEDURE — 82565 ASSAY OF CREATININE: CPT

## 2025-06-29 PROCEDURE — 83970 ASSAY OF PARATHORMONE: CPT

## 2025-06-29 PROCEDURE — 84100 ASSAY OF PHOSPHORUS: CPT

## 2025-06-29 PROCEDURE — 36415 COLL VENOUS BLD VENIPUNCTURE: CPT

## 2025-07-02 DIAGNOSIS — E83.51 HYPOCALCEMIA: Primary | ICD-10-CM

## 2025-07-11 DIAGNOSIS — R60.0 BILATERAL LEG EDEMA: ICD-10-CM

## 2025-07-11 NOTE — TELEPHONE ENCOUNTER
Medications - Current[1]  Potassium Chloride ER 10 MEQ Oral Tab CR, Take 1 tablet (10 mEq total) by mouth daily. To be taken with furosemide., Disp: 30 tablet, Rfl: 0        [1]   Current Outpatient Medications:     Sodium Chloride 0.9 % Inhalation Aero Soln, Please apply topically  2 times a day as directed by ophthalmologist., Disp: 120 mL, Rfl: 3    furosemide 20 MG Oral Tab, Take 1 tablet (20 mg total) by mouth daily. To be taken before 10:00 am, Disp: 30 tablet, Rfl: 0    Potassium Chloride ER 10 MEQ Oral Tab CR, Take 1 tablet (10 mEq total) by mouth daily. To be taken with furosemide., Disp: 30 tablet, Rfl: 0    Spacer/Aero-Holding Chambers Does not apply Device, Chamber to be used to enhance inhaler use., Disp: 1 each, Rfl: 0    hydroCHLOROthiazide 12.5 MG Oral Tab, Take 1 tablet (12.5 mg total) by mouth daily., Disp: 90 tablet, Rfl: 3    losartan 50 MG Oral Tab, Take 1 tablet (50 mg total) by mouth daily., Disp: 90 tablet, Rfl: 3    hydrOXYzine Pamoate (VISTARIL) 25 MG Oral Cap, Take 1 capsule (25 mg total) by mouth 3 (three) times daily as needed for Itching., Disp: 15 capsule, Rfl: 0    methylPREDNISolone (MEDROL) 4 MG Oral Tablet Therapy Pack, As directed., Disp: 1 each, Rfl: 0    Hydroquinone 4 % External Cream, Apply 1 Application. topically 2 (two) times daily., Disp: 30 g, Rfl: 0    HYDROcodone-acetaminophen 5-325 MG Oral Tab, Take 1 tablet by mouth every 6 (six) hours as needed for Pain., Disp: 30 tablet, Rfl: 0    levocetirizine 5 MG Oral Tab, Take 1 tablet (5 mg total) by mouth every evening., Disp: 90 tablet, Rfl: 1    Fexofenadine-Pseudoephed ER  MG Oral Tablet 12 Hr, Take 1 tablet by mouth 2 (two) times daily. (Patient taking differently: Take 1 tablet by mouth 2 (two) times daily as needed.), Disp: 30 tablet, Rfl: 0    Spacer/Aero Chamber Mouthpiece Does not apply Misc, To be used with inhaler as needed., Disp: 1 each, Rfl: 0    Biotin 36513 MCG Oral Tab, Take 1 tablet by mouth daily.,  Disp: , Rfl:

## 2025-07-15 RX ORDER — POTASSIUM CHLORIDE 750 MG/1
10 TABLET, EXTENDED RELEASE ORAL DAILY
Qty: 90 TABLET | Refills: 3 | Status: SHIPPED | OUTPATIENT
Start: 2025-07-15

## 2025-07-16 DIAGNOSIS — J45.20 MILD INTERMITTENT ASTHMA WITHOUT COMPLICATION (HCC): ICD-10-CM

## 2025-07-16 NOTE — TELEPHONE ENCOUNTER
Patient is requesting refill for albuterol inhaler       [facility]/pharmacy #[id number] - [place], [place] - [address] [phone], [phone]

## 2025-07-20 ENCOUNTER — HOSPITAL ENCOUNTER (OUTPATIENT)
Age: 60
Discharge: HOME | End: 2025-07-20
Payer: COMMERCIAL

## 2025-07-20 VITALS
TEMPERATURE: 100 F | DIASTOLIC BLOOD PRESSURE: 90 MMHG | HEART RATE: 85 BPM | SYSTOLIC BLOOD PRESSURE: 110 MMHG | RESPIRATION RATE: 20 BRPM | OXYGEN SATURATION: 100 %

## 2025-07-20 DIAGNOSIS — R05.9 COUGH: Primary | ICD-10-CM

## 2025-07-20 DIAGNOSIS — J98.01 BRONCHOSPASM: ICD-10-CM

## 2025-07-20 LAB
POCT INFLUENZA A: NEGATIVE
POCT INFLUENZA B: NEGATIVE
SARS-COV-2 RNA RESP QL NAA+PROBE: DETECTED

## 2025-07-20 RX ORDER — BENZONATATE 100 MG/1
100 CAPSULE ORAL 3 TIMES DAILY PRN
Qty: 30 CAPSULE | Refills: 0 | Status: SHIPPED | OUTPATIENT
Start: 2025-07-20

## 2025-07-20 RX ORDER — ALBUTEROL SULFATE 90 UG/1
INHALANT RESPIRATORY (INHALATION)
Qty: 1 EACH | Refills: 0 | Status: SHIPPED | OUTPATIENT
Start: 2025-07-20

## 2025-07-20 NOTE — ED INITIAL ASSESSMENT (HPI)
Pt here with complaints of body aches and chills that began 2 days ago , pt denies any fevers or sob

## 2025-07-20 NOTE — ED PROVIDER NOTES
Patient Seen in: [facility]        History  Chief Complaint   Patient presents with    Body ache and/or chills     Stated Complaint: Cold Like Symptoms    Subjective:   HPI          Sofienneka Tyler is a 60 year old female here for flu/covid like sx for 48 hours.  recently sick (+). Mild cough, needs refill on inhaler. No SOB CP, or other complaints.          Objective:     Past Medical History:    Abnormal uterine bleeding    IUD    Amenorrhea    IUD    Asthma (HCC)    Astigmatism    high     Astigmatism, irregular    OU    Essential hypertension    High blood pressure    High myopia    History of pregnancy    EAB x2 1985, 1997 no comp    Hypertension    Losartian    Infertility, female    Iritis    9/20/12    Keratoconus    2018- referred to Dr. Joselo Carrion     Lipid screening    Obesity    RUPAL (obstructive sleep apnea)    AHI 11.2 Supine AHI 14 non-supine AHI 8.5 Sao2 Vishnu 81%    Osteoarthritis    Presbyopia    Sleep apnea    Tuberculosis contact    took INH medication    UTI (urinary tract infection)    Visual impairment              Past Surgical History:   Procedure Laterality Date    Cataract      Cataract extraction w/  intraocular lens implant Right 08/31/2015    [facility] with Vision Blue and Shugarcaine    Cataract extraction w/  intraocular lens implant Left 09/14/2015    [facility] with Vision Blue and Shugarcaine    Colon surgery  2016    routine colonoscopy    Colonoscopy N/A 01/13/2017    Procedure: COLONOSCOPY;  Surgeon: Domingo Guerrero MD;  Location: [facility] ENDOSCOPY    Colonoscopy N/A 06/24/2022    Procedure: COLONOSCOPY;  Surgeon: Domingo Guerrero MD;  Location: [facility] ENDOSCOPY    D & c      D & c  2013    Eye surgery Right 01/2021    CXL with Dr. Joselo Weinstein     Insert intrauterine device  2014, 2019    Remove intrauterine device  2019    Yag capsulotomy - od - right eye Right 05/17/2017    [facility]    Yag capsulotomy - os - left eye Left 05/31/2017    [facility]  History     Socioeconomic History    Marital status:    Tobacco Use    Smoking status: Never    Smokeless tobacco: Never   Vaping Use    Vaping status: Never Used   Substance and Sexual Activity    Alcohol use: No    Drug use: No    Sexual activity: Yes     Partners: Male   Other Topics Concern    Caffeine Concern Yes     Comment: coffee 1 cup     Social Drivers of Health     Food Insecurity: No Food Insecurity (1/16/2025)    Received from [facility]    Hunger Vital Sign     Worried About Running Out of Food in the Last Year: Never true     Ran Out of Food in the Last Year: Never true   Transportation Needs: No Transportation Needs (1/16/2025)    Received from [facility]    PRAPARE - Transportation     Lack of Transportation (Medical): No     Lack of Transportation (Non-Medical): No   Housing Stability: Unknown (1/16/2025)    Received from [facility]    Housing Stability Vital Sign     Unable to Pay for Housing in the Last Year: No              Review of Systems    Positive for stated complaint: Cold Like Symptoms  Other systems are as noted in HPI.  Constitutional and vital signs reviewed.      All other systems reviewed and negative except as noted above.                  Physical Exam    ED Triage Vitals [07/20/25 0840]   /90   Pulse 85   Resp 20   Temp 100 °F (37.8 °C)   Temp src Oral   SpO2 100 %   O2 Device None (Room air)       Current Vitals:   Vital Signs  BP: 110/90  Pulse: 85  Resp: 20  Temp: 100 °F (37.8 °C)  Temp src: Oral    Oxygen Therapy  SpO2: 100 %  O2 Device: None (Room air)            Physical Exam  Vitals and nursing note reviewed.   Constitutional:       General: She is not in acute distress.     Appearance: Normal appearance. She is ill-appearing. She is not toxic-appearing or diaphoretic.   HENT:      Head: Normocephalic.      Right Ear: Tympanic membrane, ear canal and external ear normal.      Left Ear: Tympanic  membrane, ear canal and external ear normal.      Nose: Congestion and rhinorrhea (Scant clear nasal drainage) present.      Mouth/Throat:      Mouth: Mucous membranes are moist.      Pharynx: Oropharynx is clear. Posterior oropharyngeal erythema present.   Eyes:      Extraocular Movements: Extraocular movements intact.      Conjunctiva/sclera: Conjunctivae normal.      Pupils: Pupils are equal, round, and reactive to light.   Cardiovascular:      Rate and Rhythm: Normal rate.      Pulses: Normal pulses.   Pulmonary:      Effort: Pulmonary effort is normal. No respiratory distress.      Breath sounds: No wheezing.   Abdominal:      General: Abdomen is flat.   Musculoskeletal:      Cervical back: Normal range of motion. No rigidity or tenderness.   Lymphadenopathy:      Cervical: No cervical adenopathy.   Skin:     Capillary Refill: Capillary refill takes less than 2 seconds.      Findings: No erythema or rash.   Neurological:      General: No focal deficit present.      Mental Status: She is alert.   Psychiatric:         Mood and Affect: Mood normal.         Behavior: Behavior normal.         Thought Content: Thought content normal.         Judgment: Judgment normal.                 ED Course  Labs Reviewed   RAPID SARS-COV-2 BY PCR - Abnormal; Notable for the following components:       Result Value    Rapid SARS-CoV-2 by PCR Detected (*)     All other components within normal limits   POCT FLU TEST - Normal    Narrative:     This assay is a rapid molecular in vitro test utilizing nucleic acid amplification of influenza A and B viral RNA.                            MDM            Medical Decision Making    Ddx: URI vs LRI, allergies, reactive, COVID, FLU, RSV, strep, or somatic causes of symptoms    Tx for: covid.  No history of kidney failure, kidney disease, blood thinning medications.  Has taken Paxlovid in the past, with relief.   Printed and given to patient.  Recent creatinine wnl. supportive care include but  not limited to otc medications if there is no contraindication, cool mist humidifier, and oral hydration.    Avoid dairy if possible; This increases mucus production.  No stridor, No hot muffled speech, and no signs of compromise. Tolerating PO. Neuro wnl.   Reinforced ER precautions, and f/u care as needed. All questions answered, and reassurance given. No acute distress and cleared for home.      Problems Addressed:  Bronchospasm: acute illness or injury  Cough: acute illness or injury    Amount and/or Complexity of Data Reviewed  External Data Reviewed: notes.  Labs: ordered. Decision-making details documented in ED Course.     Details: Independant interpretation, and reviewed with patient       Risk  OTC drugs.  Prescription drug management.        Disposition and Plan     Clinical Impression:  1. Cough    2. Bronchospasm         Disposition:  Discharge  7/20/2025  9:01 am    Follow-up:  Kyle Maloney, DO  [address] [place] [id number]  [phone]                Medications Prescribed:  Discharge Medication List as of 7/20/2025  9:03 AM        START taking these medications    Details   nirmatrelvir-ritonavir 300-100 MG Oral Tablet Therapy Pack Take two nirmatrelvir tablets (300mg) with one ritonavir tablet (100mg) together twice daily for 5 days., Print, Disp-30 tablet, R-0This may be filled at any pharmacy.  NPI [id number].  Collaborating physician Janee Oliver.      albuterol 108 (90 Base) MCG/ACT Inhalation Aero Soln Inhale 1 puff and hold breath for 10 seconds then exhale.  Wait 1 full minute and repeat for second puff.  Use every 4-6 hours as needed., Normal, Disp-1 each, R-0NPI [id number]. Collaborating MD Janee Oliver.      benzonatate 100 MG Oral Cap Take 1 capsule (100 mg total) by mouth 3 (three) times daily as needed for cough., Normal, Disp-30 capsule, R-0NPI [id number].  Collaborating physician Janee Oliver.                   Supplementary Documentation:

## 2025-07-22 ENCOUNTER — TELEPHONE (OUTPATIENT)
Dept: FAMILY MEDICINE CLINIC | Facility: CLINIC | Age: 60
End: 2025-07-22

## 2025-07-22 DIAGNOSIS — J45.20 MILD INTERMITTENT ASTHMA WITHOUT COMPLICATION (HCC): Primary | ICD-10-CM

## 2025-07-22 RX ORDER — ALBUTEROL SULFATE 90 UG/1
2 INHALANT RESPIRATORY (INHALATION) EVERY 4 HOURS PRN
Qty: 2 EACH | Refills: 2 | Status: SHIPPED | OUTPATIENT
Start: 2025-07-22

## 2025-07-22 NOTE — TELEPHONE ENCOUNTER
Pharmacy states that patient insurance will not cover the respiclick for inhaler but will cover any of the HFA inhalers such as proair,  Proventil and Flovent.

## 2025-07-22 NOTE — TELEPHONE ENCOUNTER
Inhaler prescription has been adjusted and sent to the pharmacy.  Please call patient and let her know.

## 2025-07-22 NOTE — TELEPHONE ENCOUNTER
Called the patient and let her know of the inhaler called in, she verbalized understanding. Was very thankful

## 2025-07-22 NOTE — TELEPHONE ENCOUNTER
Medications - Current[1]  DRUG: [facility]      COMMENTS: ALTERNATIVE REQUESTED: THE PRESCRIBED MEDICATION IS NOT COVERED BY INSURANCE. PLEASE CONSIDER CHANGING TO ONE OF THE SUGGESTED COVERED ALTERNATIVES.        [1]   Current Outpatient Medications:     Albuterol Sulfate 108 (90 Base) MCG/ACT Inhalation Aerosol Powder, Breath Activated, Inhale 2 puffs into the lungs 4 (four) times daily as needed., Disp: 1 each, Rfl: 2    nirmatrelvir-ritonavir 300-100 MG Oral Tablet Therapy Pack, Take two nirmatrelvir tablets (300mg) with one ritonavir tablet (100mg) together twice daily for 5 days., Disp: 30 tablet, Rfl: 0    albuterol 108 (90 Base) MCG/ACT Inhalation Aero Soln, Inhale 1 puff and hold breath for 10 seconds then exhale.  Wait 1 full minute and repeat for second puff.  Use every 4-6 hours as needed., Disp: 1 each, Rfl: 0    benzonatate 100 MG Oral Cap, Take 1 capsule (100 mg total) by mouth 3 (three) times daily as needed for cough., Disp: 30 capsule, Rfl: 0    Potassium Chloride ER 10 MEQ Oral Tab CR, Take 1 tablet (10 mEq total) by mouth daily. To be taken with furosemide., Disp: 90 tablet, Rfl: 3    Sodium Chloride 0.9 % Inhalation Aero Soln, Please apply topically  2 times a day as directed by ophthalmologist., Disp: 120 mL, Rfl: 3    furosemide 20 MG Oral Tab, Take 1 tablet (20 mg total) by mouth daily. To be taken before 10:00 am, Disp: 30 tablet, Rfl: 0    Spacer/Aero-Holding Chambers Does not apply Device, Chamber to be used to enhance inhaler use., Disp: 1 each, Rfl: 0    hydroCHLOROthiazide 12.5 MG Oral Tab, Take 1 tablet (12.5 mg total) by mouth daily., Disp: 90 tablet, Rfl: 3    losartan 50 MG Oral Tab, Take 1 tablet (50 mg total) by mouth daily., Disp: 90 tablet, Rfl: 3    hydrOXYzine Pamoate (VISTARIL) 25 MG Oral Cap, Take 1 capsule (25 mg total) by mouth 3 (three) times daily as needed for Itching., Disp: 15 capsule, Rfl: 0    methylPREDNISolone (MEDROL) 4 MG Oral Tablet  Therapy Pack, As directed., Disp: 1 each, Rfl: 0    Hydroquinone 4 % External Cream, Apply 1 Application. topically 2 (two) times daily., Disp: 30 g, Rfl: 0    HYDROcodone-acetaminophen 5-325 MG Oral Tab, Take 1 tablet by mouth every 6 (six) hours as needed for Pain., Disp: 30 tablet, Rfl: 0    levocetirizine 5 MG Oral Tab, Take 1 tablet (5 mg total) by mouth every evening., Disp: 90 tablet, Rfl: 1    Fexofenadine-Pseudoephed ER  MG Oral Tablet 12 Hr, Take 1 tablet by mouth 2 (two) times daily. (Patient taking differently: Take 1 tablet by mouth 2 (two) times daily as needed.), Disp: 30 tablet, Rfl: 0    Spacer/Aero Chamber Mouthpiece Does not apply Misc, To be used with inhaler as needed., Disp: 1 each, Rfl: 0    Biotin 61543 MCG Oral Tab, Take 1 tablet by mouth daily., Disp: , Rfl:

## (undated) DIAGNOSIS — Z86.010 PERSONAL HISTORY OF COLONIC POLYPS: Primary | ICD-10-CM

## (undated) DEVICE — LINE MNTR ADLT SET O2 INTMD

## (undated) DEVICE — GAMMEX® NON-LATEX PI ORTHO SIZE 7.5, STERILE POLYISOPRENE POWDER-FREE SURGICAL GLOVE: Brand: GAMMEX

## (undated) DEVICE — SOLUTION  .9 1000ML BTL

## (undated) DEVICE — LOWER EXTREMITY: Brand: MEDLINE INDUSTRIES, INC.

## (undated) DEVICE — SKIN PREP TRAY 4 COMPARTM TRAY: Brand: MEDLINE INDUSTRIES, INC.

## (undated) DEVICE — FORCEP RADIAL JAW 4

## (undated) DEVICE — GAUZE,SPONGE,FLUFF,6"X6.75",STRL,5/TRAY: Brand: MEDLINE

## (undated) DEVICE — SUT ETHILON 3-0 FS-1 669H

## (undated) DEVICE — 35 ML SYRINGE REGULAR TIP: Brand: MONOJECT

## (undated) DEVICE — SUT VICRYL 2-0 CT-1 J339H

## (undated) DEVICE — DISPOSABLE TOURNIQUET CUFF SINGLE BLADDER, DUAL PORT AND QUICK CONNECT CONNECTOR: Brand: COLOR CUFF

## (undated) DEVICE — PADDING CAST WYTEX 2" STER

## (undated) DEVICE — KIT ENDO ORCAPOD 160/180/190

## (undated) DEVICE — MEDI-VAC NON-CONDUCTIVE SUCTION TUBING 6MM X 1.8M (6FT.) L: Brand: CARDINAL HEALTH

## (undated) DEVICE — KIT CLEAN ENDOKIT 1.1OZ GOWNX2

## (undated) NOTE — LETTER
1/10/2019              [phone]         Dear Jose Elias Nam,      It was a pleasure to see you at our [facility].  Your Mammogram is normal. There is no need for further testin

## (undated) NOTE — LETTER
[address] [place] [address]      Authorization for Surgical Operation and Procedure     Date:___________                                                                                                         Time:__________  1. I hereby Jaycob Coughlin MD, my physician and his/her assistants (if applicable), which may include medical students, residents, and/or fellows, to perform the following surgical operation/ procedure and administer such anesthesia as may be determined necessary by my physician:  Operation/Procedure name (s) [facility]   2. I recognize that during the surgical operation/procedure, unforeseen conditions may necessitate additional or different procedures than those listed above. I, therefore, further authorize and request that the above-named surgeon, assistants, or designees perform such procedures as are, in their judgment, necessary and desirable. 3.   My surgeon/physician has discussed prior to my surgery the potential benefits, risks and side effects of this procedure; the likelihood of achieving goals; and potential problems that might occur during recuperation. They also discussed reasonable alternatives to the procedure, including risks, benefits, and side effects related to the alternatives and risks related to not receiving this procedure. I have had all my questions answered and I acknowledge that no guarantee has been made as to the result that may be obtained. 4.   Should the need arise during my operation or immediate post-operative period, I also consent to the administration of blood and/or blood products. Further, I understand that despite careful testing and screening of blood or blood products by collecting agencies, I may still be subject to ill effects as a result of receiving a blood transfusion and/or blood products.   The following are some, but not all, of the potential risks that can occur: fever and allergic reactions, hemolytic reactions, transmission of diseases such as Hepatitis, AIDS and Cytomegalovirus (CMV) and fluid overload. In the event that I wish to have an autologous transfusion of my own blood, or a directed donor transfusion. I will discuss this with my physician. 5.   I authorize the use of any specimen, organs, tissues, body parts or foreign objects that may be removed from my body during the operation/procedure for diagnosis, research or teaching purposes and their subsequent disposal by hospital authorities. I also authorize the release of specimen test results and/or written reports to my treating physician on the hospital medical staff or other referring or consulting physicians involved in my care, at the discretion of the Pathologist or my treating physician. 6.   I consent to the photographing or videotaping of the operations or procedures to be performed, including appropriate portions of my body for medical, scientific, or educational purposes, provided my identity is not revealed by the pictures or by descriptive texts accompanying them. If the procedure has been photographed/videotaped, the surgeon will obtain the original picture, image, videotape or CD. The hospital will not be responsible for storage, release or maintenance of the picture, image, tape or CD.    7.   I consent to the presence of a  or observers in the operating room as deemed necessary by my physician or their designees. 8.   I recognize that in the event my procedure results in extended X-Ray/fluoroscopy time, I may develop a skin reaction. 9. If I have a Do Not Attempt Resuscitation (DNAR) order in place, that status will be suspended while in the operating room, procedural suite, and during the recovery period unless otherwise explicitly stated by me (or a person authorized to consent on my behalf).  The surgeon or my attending physician will determine when the applicable recovery period ends for purposes of reinstating the DNAR order. 10. Patients having a sterilization procedure: I understand that if the procedure is successful the results will be permanent and it will therefore be impossible for me to inseminate, conceive, or bear children. I also understand that the procedure is intended to result in sterility, although the result has not been guaranteed. 11. I acknowledge that my physician has explained sedation/analgesia administration to me including the risk and benefits I consent to the administration of sedation/analgesia as may be necessary or desirable in the judgment of my physician. I CERTIFY THAT I HAVE READ AND FULLY UNDERSTAND THE ABOVE CONSENT TO OPERATION and/or OTHER PROCEDURE.    _________________________________________  __________________________________  Signature of Patient     Signature of Responsible Person         ___________________________________         Printed Name of Responsible Person           _________________________________                  Relationship to Patient  _________________________________________  ______________________________  Signature of Witness          Date  Time    STATEMENT OF PHYSICIAN My signature below affirms that prior to the time of the procedure; I have explained to the patient and/or his/her legal representative, the risks and benefits involved in the proposed treatment and any reasonable alternative to the proposed treatment. I have also explained the risks and benefits involved in refusal of the proposed treatment and alternatives to the proposed treatment and have answered the patient's questions. If I have a significant financial interest in a co-management agreement or a significant financial interest in any product or implant, or other significant relationship used in this procedure/surgery, I have disclosed this and had a discussion with my patient. _______________________________________________________________ _____________________________  Jitendra Stuart)                                                                                         (Date)                                   (Time)        Patient Name: Jone Collier    : 5/15/1965   Printed: 2022      Medical Record #: [id number]                                              Page 1 of 1

## (undated) NOTE — MR AVS SNAPSHOT
[facility]  [address], [address]  [phone]               Thank you for choosing us for your health care visit with Jamison Ballesteros DO.   We are glad to serve you and happy to provide you with this sum No Known Allergies                Today's Vital Signs     BP Pulse Temp Height Weight BMI    157/85 mmHg 62 98.4 °F (36.9 °C) (Oral) 5' 5\" (1.651 m) 365 lb (165.563 kg) 60.74 kg/m2         Current Medications          This list is accurate as of: 4/8/17

## (undated) NOTE — Clinical Note
AUTHORIZATION FOR SURGICAL OPERATION OR OTHER PROCEDURE    1.  I hereby authorize Dr. Donaldo Antoine, and [facility] staff assigned to my case to perform the following operation and/or procedure at the [facility]:    Left Yag Laser Capsuloto Time:  ________ A. M.  P.M.        Patient Name:  ______________________________________________________  (please print)      Patient signature:  ___________________________________________________             Relationship to Patient:

## (undated) NOTE — MR AVS SNAPSHOT
[place]  [address]  [phone]               Thank you for choosing us for your health care visit with Adrián Cordero MD.  We are glad to serve you and happy to provide you with this summa Medical Issues Discussed Today     After-cataract with vision obscured    Myopia with astigmatism    Pseudophakia of both eyes      Instructions and Information about Your Health    After-cataract with vision obscured  Discussed posterior capsule opacit

## (undated) NOTE — ED AVS SNAPSHOT
Raquel Laurent   MRN: [id number]    Department:  [facility] Emergency Department   Date of Visit:  7/8/2019           Disclosure     Insurance plans vary and the physician(s) referred by the ER may not be covered by your plan.  Please c CARE PHYSICIAN AT ONCE OR RETURN IMMEDIATELY TO THE EMERGENCY DEPARTMENT. If you have been prescribed any medication(s), please fill your prescription right away and begin taking the medication(s) as directed.   If you believe that any of the medications

## (undated) NOTE — LETTER
ASTHMA ACTION PLAN for Sofie Tyler     : 5/15/1965     Date: 24  Doctor:  Kyle Maloney DO  Phone for doctor or clinic: [facility], [address] [id number] [phone]      ACT Score: 25    ACT Goal: 20 or greater    Call your provider if you require your rescue/quick reliever medication more than 2-3 times in a 24 hour period.    If you require your rescue inhaler/medication more than 2-3 times weekly, your asthma may not be under proper control and you should seek medical attention.    *Quick Relievers are Xopenex and Albuterol*    You can use the colors of a traffic light to help learn about your asthma medicines.  Therapy Range       1. Green - Go! % of Personal Best Peak Flow   Use controller medicine.   Breathing is good  No cough or wheeze  Can work and play Medicine How much to take When to take it                2. Yellow - Caution. 50-79% Personal Best Peak Flow  Use reliever medicine to keep an asthma attack from getting bad.   Cough  Quick Relievers  Wheezing  Tight Chest  Wake up at night Medicine How much to take When to take it    If symptoms are not improving in 24-48 hrs, call office for further instructions              3. Red - Stop! Danger! <50% Personal Best Peak Flow  Continue Controller Medications But ADD:   Medicine not helping  Breathing is hard and fast  Nose opens wide  Can't walk  Ribs show  Can't talk well Medicine How much to take When to take it    If your symptoms do not improve in ONE hour -  go to the emergency room or call 911 immediately! If symptoms improve, call office for appointment immediately.    Albuterol inhaler 2 puffs every 20 minutes for three treatments       Don't forget:  Rinse mouth after using inhaler  Use spacer for inhaler  Remember to get your Flu vaccine every fall!    [x] Asthma Action Plan reviewed with the caregiver and patient, and a copy of the plan was  given to the patient/caregiver.   [] Asthma Action Plan reviewed with the caregiver and patient on the phone, and copy mailed to patient/caregiver or sent via [employer].     Signatures:     Provider  Kyle Maloney DO Patient  Sofietian Johnson Greggilsa Caretaker

## (undated) NOTE — LETTER
11/16/2022      REGARDING:        Saumya Tyler        [address] [facility] [address]         To whom it may concern or specialist involved in proposed left foot surgery:    I am the primary care provider for [facility]. She is under my care and recommendations and medical evaluation for medical clearance for her proposed left foot surgery. She is officially cleared to proceed with the surgery. If you have any questions or concerns, please feel free to contact me. Sincerely,    Selena Looney DO  [address] [facility], [address] [facility]  [address]  [phone]        Document electronically generated by:   Selena Looney DO

## (undated) NOTE — LETTER
Date & Time: 1/26/2020, 8:59 AM  Patient: Windy Dennis  Encounter Provider(s):    Akshat Bai MD       To Whom It May Concern:    Windy Dennis was seen and treated in our department on 1/26/2020.  She should not return to work

## (undated) NOTE — LETTER
June 13, 2018    Nicklas Nyhan, DO  [address] [facility]     Patient: Mabel Prakash   YOB: 1965   Date of Visit: 6/13/2018       Dear Dr. Samanta Montejo DO:    Thank you for referring Ananda España Smokeless tobacco: Never Used                      Alcohol use: No                Medications:    Current Outpatient Prescriptions:  Losartan Potassium-HCTZ 50-12.5 MG Oral Tab Take 1 tablet by mouth daily.  Disp: 90 tablet Rfl: 3   Biotin 07733 MCG Oral Ta Lids/Lashes Meibomian gland dysfunction Meibomian gland dysfunction    Conjunctiva/Sclera melanosis  melanosis     Cornea apical thinning, no scaring apical thinning, no scaring    Anterior Chamber Deep and quiet Deep and quiet    Iris Normal Normal    Le understanding.         Orders Placed This Encounter      Fundus Photos - OU - Both Eyes      OCT, Optic Nerve - OU - Both Eyes      Holland Visual Field - OU - Both Eyes    Meds This Visit:    No prescriptions requested or ordered in this encounter     [facility]

## (undated) NOTE — LETTER
AUTHORIZATION FOR SURGICAL OPERATION OR OTHER PROCEDURE    1. I hereby authorize Dr. Maira Catalan, and [address] [place] [address] staff assigned to my case to perform the following operation and/or procedure at the [address] [place] [address]:    _______________________________________________________________________________________________    Cortisone injection Left foot   _______________________________________________________________________________________________    2. My physician has explained the nature and purpose of the operation or other procedure, possible alternative methods of treatment, the risks involved, and the possibility of complication to me. I acknowledge that no guarantee has been made as to the result that may be obtained. 3.  I recognize that, during the course of this operation, or other procedure, unforseen conditions may necessitate additional or different procedure than those listed above. I, therefore, further authorize and request that the above named physician, his/her physician assistants or designees perform such procedures as are, in his/her professional opinion, necessary and desirable. 4.  Any tissue or organs removed in the operation or other procedure may be disposed of by and at the discretion of the [address] [place] [address] and [facility]. 5.  I understand that in the event of a medical emergency, I will be transported by local paramedics to [facility] or other hospital emergency department. 6.  I certify that I have read and fully understand the above consent to operation and/or other procedure. 7.  I acknowledge that my physician has explained sedation/analgesia administration to me including the risks and benefits. I consent to the administration of sedation/analgesia as may be necessary or desirable in the judgement of my physician.     Witness signature: ___________________________________________________ Date:  ______/______/_____                    Time: ________ A. M.  P.M. Patient Name:  ______________________________________________________  (please print)      Patient signature:  ___________________________________________________             Relationship to Patient:           []  Parent    Responsible person                          []  Spouse  In case of minor or                    [] Other  _____________   Incompetent name:  __________________________________________________                               (please print)      _____________      Responsible person  In case of minor or  Incompetent signature:  _______________________________________________    Statement of Physician  My signature below affirms that prior to the time of the procedure, I have explained to the patient and/or his/her guardian, the risks and benefits involved in the proposed treatment and any reasonable alternative to the proposed treatment. I have also explained the risks and benefits involved in the refusal of the proposed treatment and have answered the patient's questions.                         Date:  ______/______/_______  Provider                      Signature:  __________________________________________________________       Time:  ___________ A.M    P.M.

## (undated) NOTE — LETTER
11/16/2022      REGARDING:        [place] Maverick Tyler        [address] [facility] [address]         To whom it may concern or specialist involved in proposed left foot surgery:    I am the primary care provider for [facility]. She is under my care and recommendations and medical evaluation for medical clearance for her proposed left foot surgery. She is officially cleared to proceed with the surgery. If you have any questions or concerns, please feel free to contact me. Sincerely,    Tere Maki DO  [address] [facility], [address] [facility]  [address]  [phone]        Document electronically generated by:   Tere Maki DO

## (undated) NOTE — MR AVS SNAPSHOT
[place]  [address]  [phone]               Thank you for choosing us for your health care visit with Barrie Wall MD.  We are glad to serve you and happy to provide you with this summa No Known Allergies                   Current Medications          This list is accurate as of: 5/31/17  7:13 AM.  Always use your most recent med list.                * Albuterol Sulfate 108 (90 Base) MCG/ACT Aepb   Inhale 2 puffs into the lungs 4 (four) active are less likely to develop some chronic diseases than adults who are inactive.      HOW TO GET STARTED: HOW TO STAY MOTIVATED:   Start activities slowly and build up over time Do what you like   Get your heart pumping – brisk walking, biking, swimmin

## (undated) NOTE — MR AVS SNAPSHOT
[place]  [address]  [phone]               Thank you for choosing us for your health care visit with Mariola Iraheta MD.  We are glad to serve you and happy to provide you with this summa This list is accurate as of: 6/10/17 10:42 AM.  Always use your most recent med list.                * Albuterol Sulfate 108 (90 Base) MCG/ACT Aepb   Inhale 2 puffs into the lungs 4 (four) times daily as needed.            * albuterol sulfate (2.5 MG/3ML) 0

## (undated) NOTE — LETTER
AUTHORIZATION FOR SURGICAL OPERATION OR OTHER PROCEDURE    1.  I hereby authorize Dr. Rebecca Venegas, and [facility] staff assigned to my case to perform the following operation and/or procedure at the [facility]:    ___________________________________ Time:  ________ A. M.  P.M.        Patient Name:  ______________________________________________________  (please print)      Patient signature:  ___________________________________________________             Relationship to Patient:

## (undated) NOTE — LETTER
11/30/2019      Regarding:        [phone]         To whom it may concern and/or airline officials:    I am the primary care provider for the above name person/patient.   Please be awar

## (undated) NOTE — Clinical Note
May 13, 2017    Bryanna Newman DO  [address] [facility]     Patient: Lynette Kenny   YOB: 1965   Date of Visit: 5/13/2017       Dear Dr. Nicholas Sifuentes DO:    Thank you for referring Ty Frye Disp: 1 each Rfl: 2   albuterol sulfate (2.5 MG/3ML) 0.083% Inhalation Nebu Soln Take 3 mL (2.5 mg total) by nebulization every 6 (six) hours as needed for Wheezing. Disp: 1 Box Rfl: 0   Levonorgestrel (MIRENA IU) by Intrauterine route.  Disp:  Rfl: Left -2.75 +3.25 180 20/25- +2.25 20/20         Final Rx      Sphere Cylinder Axis Add   Right -2.00 +2.75 180 +2.25   Left -2.75 +3.25 180 +2.25       Type:  Progressive bifocal                 ASSESSMENT/PLAN:     Diagnoses and Plan:     After-cataract w

## (undated) NOTE — MR AVS SNAPSHOT
[place]  [address]  [phone]               Thank you for choosing us for your health care visit with Adam Arredondo MD.  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 Meron Forman with no complications.  Pt will return on May 31 for YA

## (undated) NOTE — LETTER
8/9/2019              [phone]         Dear Arcenio Tee,      It was a pleasure to see you at our [address] [place] [address], [facility] office.   Neg pap, HPV neg, rep

## (undated) NOTE — LETTER
AUTHORIZATION FOR SURGICAL OPERATION OR OTHER PROCEDURE    1. I hereby authorize Dr. Jie Alcantara, and [address] [place] [address] staff assigned to my case to perform the following operation and/or procedure at the [address] [place] [address]:    IUD REMOVAL      2. My physician has explained the nature and purpose of the operation or other procedure, possible alternative methods of treatment, the risks involved, and the possibility of complication to me. I acknowledge that no guarantee has been made as to the result that may be obtained. 3.  I recognize that, during the course of this operation, or other procedure, unforseen conditions may necessitate additional or different procedure than those listed above. I, therefore, further authorize and request that the above named physician, his/her physician assistants or designees perform such procedures as are, in his/her professional opinion, necessary and desirable. 4.  Any tissue or organs removed in the operation or other procedure may be disposed of by and at the discretion of the [address] [place] [address] and [facility]. 5.  I understand that in the event of a medical emergency, I will be transported by local paramedics to [facility] or other hospital emergency department. 6.  I certify that I have read and fully understand the above consent to operation and/or other procedure. 7.  I acknowledge that my physician has explained sedation/analgesia administration to me including the risks and benefits. I consent to the administration of sedation/analgesia as may be necessary or desirable in the judgement of my physician. Witness signature: ___________________________________________________ Date:  ______/______/_____                    Time:  ________ A. M.  P.M.        Patient Name:  ______________________________________________________  (please print)      Patient signature:  ___________________________________________________             Relationship to Patient:           []  Parent    Responsible person                          []  Spouse  In case of minor or                    [] Other  _____________   Incompetent name:  __________________________________________________                               (please print)      _____________      Responsible person  In case of minor or  Incompetent signature:  _______________________________________________    Statement of Physician  My signature below affirms that prior to the time of the procedure, I have explained to the patient and/or his/her guardian, the risks and benefits involved in the proposed treatment and any reasonable alternative to the proposed treatment. I have also explained the risks and benefits involved in the refusal of the proposed treatment and have answered the patient's questions.                         Date:  ______/______/_______  Provider                      Signature:  __________________________________________________________       Time:  ___________ A.M    P.M.

## (undated) NOTE — Clinical Note
AUTHORIZATION FOR SURGICAL OPERATION OR OTHER PROCEDURE    1.  I hereby [facility] Aurora Morejon, [facility] staff assigned to my case to perform the following operation and/or procedure at the [facility]:    __Left Yag Capsulotomy Time:  ________ A. M.  P.M.        Patient Name:  ______________________________________________________  (please print)      Patient signature:  ___________________________________________________             Relationship to Patient:

## (undated) NOTE — Clinical Note
AUTHORIZATION FOR SURGICAL OPERATION OR OTHER PROCEDURE    1.  I hereby [facility] Dr. Bing Robison, and [facility] staff assigned to my case to perform the following operation and/or procedure at the [facility]:    ________________________ Patient Name:  ______________________________________________________  (please print)      Patient signature:  ___________________________________________________             Relationship to Patient:             Parent    Responsible person

## (undated) NOTE — LETTER
[address] [address] Tomy Mendez, [address] [facility]  [phone]            Patient Information:  Patient Name:  Terrie Welch, ([id number]) Sex: female : 5/15/1965   ___________________________________

## (undated) NOTE — LETTER
AUTHORIZATION FOR SURGICAL OPERATION OR OTHER PROCEDURE    1. I hereby authorize Nusrat Alfaro, and [place] [facility] staff assigned to my case to perform the following operation and/or procedure at the [facility], [facility]:    _______________________________________________________________________________________________    Endometrial biopsy   _______________________________________________________________________________________________    2. My physician has explained the nature and purpose of the operation or other procedure, possible alternative methods of treatment, the risks involved, and the possibility of complication to me. I acknowledge that no guarantee has been made as to the result that may be obtained. 3.  I recognize that, during the course of this operation, or other procedure, unforseen conditions may necessitate additional or different procedure than those listed above. I, therefore, further authorize and request that the above named physician, his/her physician assistants or designees perform such procedures as are, in his/her professional opinion, necessary and desirable. 4.  Any tissue or organs removed in the operation or other procedure may be disposed of by and at the discretion of the [facility], [facility] and [facility]. 5.  I understand that in the event of a medical emergency, I will be transported by local paramedics to [facility] or other [facility] [place] emergency department. 6.  I certify that I have read and fully understand the above consent to operation and/or other procedure. 7.  I acknowledge that my physician has explained sedation/analgesia administration to me including the risks and benefits. I consent to the administration of sedation/analgesia as may be necessary or desirable in the judgement of my physician. Witness signature: ___________________________________________________ Date:  ______/______/_____                    Time:  ________ A. M. P.M.       Patient Name:  ______________________________________________________  (please print)      Patient signature:  ___________________________________________________             Relationship to Patient:           []  Parent    Responsible person                          []  Spouse  In case of minor or                    [] Other  _____________   Incompetent name:  __________________________________________________                               (please print)      _____________      Responsible person  In case of minor or  Incompetent signature:  _______________________________________________    Statement of Physician  My signature below affirms that prior to the time of the procedure, I have explained to the patient and/or his/her guardian, the risks and benefits involved in the proposed treatment and any reasonable alternative to the proposed treatment. I have also explained the risks and benefits involved in the refusal of the proposed treatment and have answered the patient's questions.                         Date:  ______/______/_______  Provider                      Signature:  __________________________________________________________       Time:  ___________ A.M    P.M.